# Patient Record
Sex: FEMALE | Race: BLACK OR AFRICAN AMERICAN | NOT HISPANIC OR LATINO
[De-identification: names, ages, dates, MRNs, and addresses within clinical notes are randomized per-mention and may not be internally consistent; named-entity substitution may affect disease eponyms.]

---

## 2017-02-17 ENCOUNTER — APPOINTMENT (OUTPATIENT)
Dept: PULMONOLOGY | Facility: CLINIC | Age: 82
End: 2017-02-17

## 2017-02-17 VITALS
RESPIRATION RATE: 16 BRPM | OXYGEN SATURATION: 95 % | SYSTOLIC BLOOD PRESSURE: 130 MMHG | HEART RATE: 74 BPM | DIASTOLIC BLOOD PRESSURE: 74 MMHG

## 2017-05-15 ENCOUNTER — RX RENEWAL (OUTPATIENT)
Age: 82
End: 2017-05-15

## 2017-09-27 ENCOUNTER — APPOINTMENT (OUTPATIENT)
Dept: PULMONOLOGY | Facility: CLINIC | Age: 82
End: 2017-09-27
Payer: MEDICARE

## 2017-09-27 VITALS
RESPIRATION RATE: 14 BRPM | OXYGEN SATURATION: 96 % | DIASTOLIC BLOOD PRESSURE: 80 MMHG | HEART RATE: 76 BPM | SYSTOLIC BLOOD PRESSURE: 122 MMHG | BODY MASS INDEX: 29.84 KG/M2 | WEIGHT: 152 LBS | HEIGHT: 60 IN

## 2017-09-27 DIAGNOSIS — Z85.3 PERSONAL HISTORY OF MALIGNANT NEOPLASM OF BREAST: ICD-10-CM

## 2017-09-27 PROCEDURE — 94010 BREATHING CAPACITY TEST: CPT

## 2017-09-27 PROCEDURE — 99214 OFFICE O/P EST MOD 30 MIN: CPT | Mod: 25

## 2017-11-14 ENCOUNTER — RX RENEWAL (OUTPATIENT)
Age: 82
End: 2017-11-14

## 2018-03-26 ENCOUNTER — APPOINTMENT (OUTPATIENT)
Dept: PULMONOLOGY | Facility: CLINIC | Age: 83
End: 2018-03-26
Payer: MEDICARE

## 2018-03-26 VITALS
SYSTOLIC BLOOD PRESSURE: 140 MMHG | WEIGHT: 160 LBS | BODY MASS INDEX: 31.25 KG/M2 | DIASTOLIC BLOOD PRESSURE: 60 MMHG | HEART RATE: 78 BPM | OXYGEN SATURATION: 96 %

## 2018-03-26 PROCEDURE — 99214 OFFICE O/P EST MOD 30 MIN: CPT

## 2018-05-18 ENCOUNTER — RX RENEWAL (OUTPATIENT)
Age: 83
End: 2018-05-18

## 2018-09-28 ENCOUNTER — APPOINTMENT (OUTPATIENT)
Dept: PULMONOLOGY | Facility: CLINIC | Age: 83
End: 2018-09-28

## 2018-10-03 ENCOUNTER — APPOINTMENT (OUTPATIENT)
Dept: PULMONOLOGY | Facility: CLINIC | Age: 83
End: 2018-10-03
Payer: MEDICARE

## 2018-10-03 VITALS
HEART RATE: 75 BPM | WEIGHT: 160 LBS | OXYGEN SATURATION: 95 % | DIASTOLIC BLOOD PRESSURE: 66 MMHG | BODY MASS INDEX: 31.25 KG/M2 | SYSTOLIC BLOOD PRESSURE: 130 MMHG

## 2018-10-03 PROCEDURE — 85018 HEMOGLOBIN: CPT | Mod: QW

## 2018-10-03 PROCEDURE — 94010 BREATHING CAPACITY TEST: CPT

## 2018-10-03 PROCEDURE — 94727 GAS DIL/WSHOT DETER LNG VOL: CPT

## 2018-10-03 PROCEDURE — 99214 OFFICE O/P EST MOD 30 MIN: CPT | Mod: 25

## 2019-01-07 ENCOUNTER — RX RENEWAL (OUTPATIENT)
Age: 84
End: 2019-01-07

## 2019-01-30 ENCOUNTER — RX RENEWAL (OUTPATIENT)
Age: 84
End: 2019-01-30

## 2019-04-09 ENCOUNTER — MEDICATION RENEWAL (OUTPATIENT)
Age: 84
End: 2019-04-09

## 2019-04-16 ENCOUNTER — APPOINTMENT (OUTPATIENT)
Dept: PULMONOLOGY | Facility: CLINIC | Age: 84
End: 2019-04-16
Payer: MEDICARE

## 2019-04-16 VITALS — SYSTOLIC BLOOD PRESSURE: 118 MMHG | DIASTOLIC BLOOD PRESSURE: 80 MMHG

## 2019-04-16 VITALS — BODY MASS INDEX: 31.61 KG/M2 | HEIGHT: 60 IN | WEIGHT: 161 LBS

## 2019-04-16 VITALS — OXYGEN SATURATION: 93 % | HEART RATE: 74 BPM

## 2019-04-16 DIAGNOSIS — Z87.09 PERSONAL HISTORY OF OTHER DISEASES OF THE RESPIRATORY SYSTEM: ICD-10-CM

## 2019-04-16 PROCEDURE — 99214 OFFICE O/P EST MOD 30 MIN: CPT

## 2019-04-16 NOTE — DISCUSSION/SUMMARY
[FreeTextEntry1] : \par   #1. The patient has evidence of Gold stage II-III COPD for which she will continue her Advair, Spiriva, and p.r.n. albuterol\par   #2. I have recommended diet and exercise for weight loss and to improve her exercise tolerance.\par   #3. She'll follow up in this office in 6 weeks for reevaluation \par #4. Will follow PFTs intermittently \par #5. Oncology f/u for breast cancer\par #6. Prednisone taper and Z-cassidy for cough and acute bronchitis

## 2019-04-16 NOTE — REVIEW OF SYSTEMS
[Chills] : no chills [Fever] : no fever [Eye Irritation] : no ~T irritation of the eyes [Dry Eyes] : no dryness of the eyes [Nasal Congestion] : no nasal congestion [Epistaxis] : no nosebleeds [Postnasal Drip] : no postnasal drip [Sputum] : not coughing up ~M sputum [Sinus Problems] : no sinus problems [Cough] : no cough [Dyspnea] : no dyspnea [Chest Tightness] : no chest tightness [Hemoptysis] : no hemoptysis [Wheezing] : no wheezing [Pleuritic Pain] : no pleuritic pain [Chest Discomfort] : no chest discomfort [Hypertension] : ~T hypertension [Dysrhythmia] : no dysrhythmia [Edema] : ~T edema was not present [Palpitations] : no palpitations [Murmurs] : no murmurs were heard [Reflux] : no reflux [Hay Fever] : no hay fever [Itchy Eyes] : no itching of ~T the eyes [Nausea] : no nausea [Vomiting] : no vomiting [Constipation] : no constipation [Diarrhea] : no diarrhea [Abdominal Pain] : no abdominal pain [Dysuria] : no dysuria [Trauma] : no ~T physical trauma [Back Pain] : ~T no back pain [Fracture] : no fracture [Anemia] : no anemia [Dizziness] : no dizziness [Headache] : no headache [Syncope] : no fainting [Paralysis] : no paralysis was seen [Numbness] : no numbness [Anxiety] : no anxiety [Seizures] : no seizures [Depression] : no depression [Diabetes] : no diabetes mellitus [Thyroid Problem] : thyroid problem [Difficulty Initiating Sleep] : no difficulty falling asleep [Witnessed Apneas] : demonstrated no ~M apnea [Snoring] : no snoring [Difficulty Maintaining Sleep] : no difficulty maintaining sleep [Nonrestorative Sleep] : restorative sleep

## 2019-04-16 NOTE — PROCEDURE
[FreeTextEntry1] : PFTs performed previously revealed moderate COPD and was unchanged when c/w her previous. Lung volumes are normal and could not assess diffusion capacity.\par Spirometry subsequently was c/w mild to moderate COPD and was without significant change when c/w her previous study\par PFTs 10/3/18 - Moderately reduced FVC and FEV1 with an obstructive pattern and near normal to mildly reduced lung volumes; unable to assess DLCO; overall lung function is slightly reduced than her previous but near baseline.

## 2019-04-16 NOTE — HISTORY OF PRESENT ILLNESS
[FreeTextEntry1] : Patient c/o SOBOE but is now also c/o wheeze, cough productive whitish sputum. She denies any fevers or chills to this point. She reports these symptoms started since last night.\par  [Doing Well] : doing well [Feelings Of Weakness On Exertion] : stable exercise intolerance [Difficulty Breathing During Exertion] : worsened dyspnea on exertion [Coughing Up Sputum] : worsened coughing up sputum [Cough] : worsened coughing [Regional Soft Tissue Swelling Both Lower Extremities] : denies lower extremity edema [Wheezing] : worsened wheezing [URI] : upper respiratory tract infection [Adherent] : the patient is adherent with ~his/her~ medication regimen [Follow-Up - Routine Clinic] : a routine clinic follow-up of [Goals--Doing Well] : the patient is doing well with ~his/her~ COPD goals [Excess Weight] : excess weight [Inability to Lose Weight] : inability to lose weight [Low Calorie Diet] : low calorie diet [None] : No associated symptoms are reported [Poor Compliance] : poor compliance with treatment [Poor Symptom Control] : poor symptom control [Poor Tolerance] : poor tolerance of treatment [Hypertension] : hypertension [Low Calorie] : low calorie [High] : high [Sedentary] : is sedentary [Well Balanced Diet] : well balanced meals

## 2019-04-16 NOTE — PHYSICAL EXAM
[Normal Conjunctiva] : the conjunctiva exhibited no abnormalities [Normal Appearance] : normal appearance [Enlarged Base of the Tongue] : enlargement of the base of the tongue [III] : III [Neck Appearance] : the appearance of the neck was normal [Heart Rate And Rhythm] : heart rate and rhythm were normal [Murmurs] : no murmurs present [Heart Sounds] : normal S1 and S2 [Edema] : no peripheral edema present [] : no respiratory distress [Auscultation Breath Sounds / Voice Sounds] : lungs were clear to auscultation bilaterally [Exaggerated Use Of Accessory Muscles For Inspiration] : no accessory muscle use [Respiration, Rhythm And Depth] : normal respiratory rhythm and effort [Abdomen Soft] : soft [FreeTextEntry1] : No abnormalities [Abdomen Tenderness] : non-tender [Abnormal Walk] : normal gait [No Focal Deficits] : no focal deficits [Nail Clubbing] : no clubbing of the fingernails [Cyanosis, Localized] : no localized cyanosis [Oriented To Time, Place, And Person] : oriented to person, place, and time

## 2019-04-16 NOTE — REASON FOR VISIT
[Acute] : an acute visit [Shortness of Breath] : shortness of Breath [FreeTextEntry2] : obesity [COPD] : COPD

## 2019-04-16 NOTE — CONSULT LETTER
[Consult Letter:] : I had the pleasure of evaluating your patient, [unfilled]. [Dear  ___] : Dear  [unfilled], [Please see my note below.] : Please see my note below. [Consult Closing:] : Thank you very much for allowing me to participate in the care of this patient.  If you have any questions, please do not hesitate to contact me. [Sincerely,] : Sincerely, [Deshawn Vidales MD] : Deshawn Vidales MD [FreeTextEntry3] : Deshawn Vidales MD, FCCP, SUSAN. ABSM\par

## 2019-05-17 ENCOUNTER — APPOINTMENT (OUTPATIENT)
Dept: PULMONOLOGY | Facility: CLINIC | Age: 84
End: 2019-05-17
Payer: MEDICARE

## 2019-05-17 VITALS
DIASTOLIC BLOOD PRESSURE: 70 MMHG | OXYGEN SATURATION: 95 % | HEIGHT: 60 IN | SYSTOLIC BLOOD PRESSURE: 120 MMHG | HEART RATE: 78 BPM | BODY MASS INDEX: 32.03 KG/M2

## 2019-05-17 VITALS — WEIGHT: 164 LBS | BODY MASS INDEX: 32.03 KG/M2

## 2019-05-17 PROCEDURE — 99214 OFFICE O/P EST MOD 30 MIN: CPT | Mod: 25

## 2019-05-17 PROCEDURE — 94664 DEMO&/EVAL PT USE INHALER: CPT | Mod: 59

## 2019-05-17 PROCEDURE — 94060 EVALUATION OF WHEEZING: CPT

## 2019-05-17 NOTE — HISTORY OF PRESENT ILLNESS
[Doing Well] : doing well [Difficulty Breathing During Exertion] : improved dyspnea on exertion [Feelings Of Weakness On Exertion] : stable exercise intolerance [Cough] : resolved coughing [Coughing Up Sputum] : resolved coughing up sputum [Wheezing] : resolved wheezing [Regional Soft Tissue Swelling Both Lower Extremities] : denies lower extremity edema [URI] : upper respiratory tract infection [Adherent] : the patient is adherent with ~his/her~ medication regimen [Goals--Doing Well] : the patient is doing well with ~his/her~ COPD goals [Follow-Up - Routine Clinic] : a routine clinic follow-up of [Excess Weight] : excess weight [None] : No associated symptoms are reported [Inability to Lose Weight] : inability to lose weight [Low Calorie Diet] : low calorie diet [Poor Compliance] : poor compliance with treatment [Poor Tolerance] : poor tolerance of treatment [Poor Symptom Control] : poor symptom control [High] : high [Hypertension] : hypertension [Low Calorie] : low calorie [Well Balanced Diet] : well balanced meals [Sedentary] : is sedentary [FreeTextEntry1] : Patient reports resolution of her previous URI symptoms and is back to baseline.\par

## 2019-05-17 NOTE — DISCUSSION/SUMMARY
[FreeTextEntry1] : \par   #1. The patient has evidence of Gold stage II-III COPD for which she will continue her Advair, Spiriva, and p.r.n. albuterol\par   #2. I have recommended diet and exercise for weight loss and to improve her exercise tolerance.\par   #3. She'll follow up in this office in 4 months for reevaluation \par #4. Will follow PFTs intermittently \par #5. Oncology f/u for breast cancer\par #6. Completed prednisone taper and Z-cassidy for cough and acute bronchitis

## 2019-05-17 NOTE — REVIEW OF SYSTEMS
[Hypertension] : ~T hypertension [Thyroid Problem] : thyroid problem [Fever] : no fever [Chills] : no chills [Dry Eyes] : no dryness of the eyes [Eye Irritation] : no ~T irritation of the eyes [Epistaxis] : no nosebleeds [Nasal Congestion] : no nasal congestion [Sinus Problems] : no sinus problems [Postnasal Drip] : no postnasal drip [Cough] : no cough [Hemoptysis] : no hemoptysis [Sputum] : not coughing up ~M sputum [Chest Tightness] : no chest tightness [Dyspnea] : no dyspnea [Pleuritic Pain] : no pleuritic pain [Wheezing] : no wheezing [Chest Discomfort] : no chest discomfort [Murmurs] : no murmurs were heard [Dysrhythmia] : no dysrhythmia [Palpitations] : no palpitations [Hay Fever] : no hay fever [Edema] : ~T edema was not present [Itchy Eyes] : no itching of ~T the eyes [Reflux] : no reflux [Nausea] : no nausea [Vomiting] : no vomiting [Constipation] : no constipation [Diarrhea] : no diarrhea [Abdominal Pain] : no abdominal pain [Dysuria] : no dysuria [Trauma] : no ~T physical trauma [Fracture] : no fracture [Back Pain] : ~T no back pain [Anemia] : no anemia [Headache] : no headache [Dizziness] : no dizziness [Syncope] : no fainting [Numbness] : no numbness [Seizures] : no seizures [Paralysis] : no paralysis was seen [Depression] : no depression [Anxiety] : no anxiety [Diabetes] : no diabetes mellitus [Difficulty Initiating Sleep] : no difficulty falling asleep [Difficulty Maintaining Sleep] : no difficulty maintaining sleep [Snoring] : no snoring [Witnessed Apneas] : demonstrated no ~M apnea [Nonrestorative Sleep] : restorative sleep

## 2019-05-17 NOTE — PROCEDURE
[FreeTextEntry1] : PFTs performed previously revealed moderate COPD and was unchanged when c/w her previous. Lung volumes are normal and could not assess diffusion capacity.\par Spirometry subsequently was c/w mild to moderate COPD and was without significant change when c/w her previous study\par PFTs 10/3/18 - Moderately reduced FVC and FEV1 with an obstructive pattern and near normal to mildly reduced lung volumes; unable to assess DLCO; overall lung function is slightly reduced than her previous but near baseline.\par Spirometry 5/17/19 - Moderate COPD and slightly worse than previous.

## 2019-05-17 NOTE — PHYSICAL EXAM
[Normal Conjunctiva] : the conjunctiva exhibited no abnormalities [Normal Appearance] : normal appearance [Enlarged Base of the Tongue] : enlargement of the base of the tongue [III] : III [Neck Appearance] : the appearance of the neck was normal [Heart Rate And Rhythm] : heart rate and rhythm were normal [Murmurs] : no murmurs present [Heart Sounds] : normal S1 and S2 [Edema] : no peripheral edema present [] : no respiratory distress [Exaggerated Use Of Accessory Muscles For Inspiration] : no accessory muscle use [Respiration, Rhythm And Depth] : normal respiratory rhythm and effort [Auscultation Breath Sounds / Voice Sounds] : lungs were clear to auscultation bilaterally [Abdomen Soft] : soft [Abdomen Tenderness] : non-tender [Abnormal Walk] : normal gait [Nail Clubbing] : no clubbing of the fingernails [Cyanosis, Localized] : no localized cyanosis [No Focal Deficits] : no focal deficits [Oriented To Time, Place, And Person] : oriented to person, place, and time [FreeTextEntry1] : No abnormalities

## 2019-05-17 NOTE — CONSULT LETTER
[Dear  ___] : Dear  [unfilled], [Consult Letter:] : I had the pleasure of evaluating your patient, [unfilled]. [Consult Closing:] : Thank you very much for allowing me to participate in the care of this patient.  If you have any questions, please do not hesitate to contact me. [Sincerely,] : Sincerely, [Please see my note below.] : Please see my note below. [Deshawn Vidales MD] : Deshawn Vidales MD [FreeTextEntry3] : Deshawn Vidales MD, FCCP, SUSAN. ABSM\par

## 2019-05-17 NOTE — REASON FOR VISIT
[Follow-Up] : a follow-up visit [COPD] : COPD [Shortness of Breath] : shortness of Breath [FreeTextEntry2] : obesity

## 2019-08-22 ENCOUNTER — APPOINTMENT (OUTPATIENT)
Dept: PULMONOLOGY | Facility: CLINIC | Age: 84
End: 2019-08-22
Payer: MEDICARE

## 2019-08-22 VITALS
SYSTOLIC BLOOD PRESSURE: 140 MMHG | BODY MASS INDEX: 32.2 KG/M2 | DIASTOLIC BLOOD PRESSURE: 76 MMHG | HEART RATE: 76 BPM | OXYGEN SATURATION: 96 % | HEIGHT: 60 IN | WEIGHT: 164 LBS

## 2019-08-22 PROCEDURE — 99215 OFFICE O/P EST HI 40 MIN: CPT | Mod: 25

## 2019-08-22 PROCEDURE — 94664 DEMO&/EVAL PT USE INHALER: CPT | Mod: 59

## 2019-08-22 PROCEDURE — 94060 EVALUATION OF WHEEZING: CPT

## 2019-08-22 NOTE — HISTORY OF PRESENT ILLNESS
[Difficulty Breathing During Exertion] : worsened dyspnea on exertion [Feelings Of Weakness On Exertion] : worsened exercise intolerance [Cough] : worsened coughing [Coughing Up Sputum] : worsened coughing up sputum [Wheezing] : worsened wheezing [Regional Soft Tissue Swelling Both Lower Extremities] : denies lower extremity edema [URI] : upper respiratory tract infection [Adherent] : the patient is adherent with ~his/her~ medication regimen [Goals--Doing Well] : the patient is doing well with ~his/her~ COPD goals [Follow-Up - Routine Clinic] : a routine clinic follow-up of [Excess Weight] : excess weight [Inability to Lose Weight] : inability to lose weight [None] : No associated symptoms are reported [Low Calorie Diet] : low calorie diet [Poor Compliance] : poor compliance with treatment [Poor Symptom Control] : poor symptom control [Poor Tolerance] : poor tolerance of treatment [Hypertension] : hypertension [High] : high [Low Calorie] : low calorie [Well Balanced Diet] : well balanced meals [Sedentary] : is sedentary [FreeTextEntry1] : Patient reports increased SOB and wheeze with cough productive of clear sputum. She has been using her inhalers without improvement.\par  [Doing Poorly] : doing poorly

## 2019-08-22 NOTE — REASON FOR VISIT
[COPD] : COPD [Shortness of Breath] : shortness of Breath [Acute] : an acute visit [FreeTextEntry2] : obesity

## 2019-08-22 NOTE — CONSULT LETTER
[Dear  ___] : Dear  [unfilled], [Consult Letter:] : I had the pleasure of evaluating your patient, [unfilled]. [Please see my note below.] : Please see my note below. [Consult Closing:] : Thank you very much for allowing me to participate in the care of this patient.  If you have any questions, please do not hesitate to contact me. [Sincerely,] : Sincerely, [Deshawn Vidales MD] : Deshawn Vidales MD [FreeTextEntry3] : Deshawn Vidales MD, FCCP, D. ABSM\par Pulmonary and Sleep Medicine\par [facility] [place]\par

## 2019-08-22 NOTE — REVIEW OF SYSTEMS
[Hypertension] : ~T hypertension [Thyroid Problem] : thyroid problem [Fever] : no fever [Chills] : no chills [Dry Eyes] : no dryness of the eyes [Eye Irritation] : no ~T irritation of the eyes [Nasal Congestion] : no nasal congestion [Epistaxis] : no nosebleeds [Postnasal Drip] : no postnasal drip [Sinus Problems] : no sinus problems [Sputum] : not coughing up ~M sputum [Cough] : no cough [Dyspnea] : no dyspnea [Hemoptysis] : no hemoptysis [Chest Tightness] : no chest tightness [Pleuritic Pain] : no pleuritic pain [Wheezing] : no wheezing [Chest Discomfort] : no chest discomfort [Dysrhythmia] : no dysrhythmia [Murmurs] : no murmurs were heard [Palpitations] : no palpitations [Edema] : ~T edema was not present [Hay Fever] : no hay fever [Reflux] : no reflux [Itchy Eyes] : no itching of ~T the eyes [Nausea] : no nausea [Vomiting] : no vomiting [Constipation] : no constipation [Diarrhea] : no diarrhea [Abdominal Pain] : no abdominal pain [Dysuria] : no dysuria [Fracture] : no fracture [Trauma] : no ~T physical trauma [Back Pain] : ~T no back pain [Anemia] : no anemia [Headache] : no headache [Dizziness] : no dizziness [Syncope] : no fainting [Numbness] : no numbness [Paralysis] : no paralysis was seen [Seizures] : no seizures [Depression] : no depression [Anxiety] : no anxiety [Difficulty Initiating Sleep] : no difficulty falling asleep [Diabetes] : no diabetes mellitus [Snoring] : no snoring [Difficulty Maintaining Sleep] : no difficulty maintaining sleep [Nonrestorative Sleep] : restorative sleep [Witnessed Apneas] : demonstrated no ~M apnea

## 2019-08-22 NOTE — PHYSICAL EXAM
[Normal Appearance] : normal appearance [Normal Conjunctiva] : the conjunctiva exhibited no abnormalities [Enlarged Base of the Tongue] : enlargement of the base of the tongue [III] : III [Neck Appearance] : the appearance of the neck was normal [Heart Rate And Rhythm] : heart rate and rhythm were normal [Heart Sounds] : normal S1 and S2 [Murmurs] : no murmurs present [Edema] : no peripheral edema present [] : no respiratory distress [Respiration, Rhythm And Depth] : normal respiratory rhythm and effort [Exaggerated Use Of Accessory Muscles For Inspiration] : no accessory muscle use [Auscultation Breath Sounds / Voice Sounds] : lungs were clear to auscultation bilaterally [Abdomen Soft] : soft [Abdomen Tenderness] : non-tender [Abnormal Walk] : normal gait [Nail Clubbing] : no clubbing of the fingernails [Cyanosis, Localized] : no localized cyanosis [No Focal Deficits] : no focal deficits [Oriented To Time, Place, And Person] : oriented to person, place, and time [FreeTextEntry1] : No abnormalities

## 2019-08-22 NOTE — DISCUSSION/SUMMARY
[FreeTextEntry1] : \par   #1. The patient has evidence of Gold stage II-III COPD for which she will continue her Advair, Spiriva, and p.r.n. albuterol\par   #2. I have recommended diet and exercise for weight loss and to improve her exercise tolerance.\par   #3. She'll follow up in this office in 3 months for reevaluation \par #4. Will follow lung function\par #5. Oncology f/u for breast cancer\par #6. Prednisone taper and Z-cassidy for cough and wheeze\par #7. Will start nebulizer therapy for COPD/wheeze with Albuterol as needed

## 2019-08-22 NOTE — PROCEDURE
[FreeTextEntry1] : PFTs performed previously revealed moderate COPD and was unchanged when c/w her previous. Lung volumes are normal and could not assess diffusion capacity.\par Spirometry subsequently was c/w mild to moderate COPD and was without significant change when c/w her previous study\par PFTs 10/3/18 - Moderately reduced FVC and FEV1 with an obstructive pattern and near normal to mildly reduced lung volumes; unable to assess DLCO; overall lung function is slightly reduced than her previous but near baseline.\par Spirometry 5/17/19 - Moderate COPD and slightly worse than previous.\par Spirometry 8/22/19 - Moderate COPD without significant change

## 2019-08-23 ENCOUNTER — MEDICATION RENEWAL (OUTPATIENT)
Age: 84
End: 2019-08-23

## 2019-09-17 ENCOUNTER — APPOINTMENT (OUTPATIENT)
Dept: PULMONOLOGY | Facility: CLINIC | Age: 84
End: 2019-09-17

## 2020-01-10 ENCOUNTER — APPOINTMENT (OUTPATIENT)
Dept: PULMONOLOGY | Facility: CLINIC | Age: 85
End: 2020-01-10
Payer: MEDICARE

## 2020-01-10 VITALS — DIASTOLIC BLOOD PRESSURE: 88 MMHG | SYSTOLIC BLOOD PRESSURE: 150 MMHG

## 2020-01-10 VITALS — WEIGHT: 159 LBS | HEART RATE: 76 BPM | BODY MASS INDEX: 32.49 KG/M2 | OXYGEN SATURATION: 95 % | HEIGHT: 58.5 IN

## 2020-01-10 PROCEDURE — 94664 DEMO&/EVAL PT USE INHALER: CPT | Mod: 59

## 2020-01-10 PROCEDURE — 94060 EVALUATION OF WHEEZING: CPT

## 2020-01-10 PROCEDURE — 94727 GAS DIL/WSHOT DETER LNG VOL: CPT

## 2020-01-10 PROCEDURE — 85018 HEMOGLOBIN: CPT | Mod: QW

## 2020-01-10 PROCEDURE — 99215 OFFICE O/P EST HI 40 MIN: CPT | Mod: 25

## 2020-01-10 RX ORDER — PREDNISONE 5 MG/1
5 TABLET ORAL DAILY
Qty: 100 | Refills: 1 | Status: DISCONTINUED | COMMUNITY
Start: 2019-04-16 | End: 2020-01-10

## 2020-01-10 RX ORDER — AZITHROMYCIN 250 MG/1
250 TABLET, FILM COATED ORAL
Qty: 1 | Refills: 0 | Status: DISCONTINUED | COMMUNITY
Start: 2019-04-16 | End: 2020-01-10

## 2020-01-10 RX ORDER — PREDNISONE 10 MG/1
10 TABLET ORAL DAILY
Qty: 100 | Refills: 1 | Status: DISCONTINUED | COMMUNITY
Start: 2019-08-22 | End: 2020-01-10

## 2020-01-10 RX ORDER — AZITHROMYCIN 250 MG/1
250 TABLET, FILM COATED ORAL
Qty: 1 | Refills: 0 | Status: DISCONTINUED | COMMUNITY
Start: 2019-08-22 | End: 2020-01-10

## 2020-01-10 NOTE — CONSULT LETTER
[Dear  ___] : Dear  [unfilled], [Consult Letter:] : I had the pleasure of evaluating your patient, [unfilled]. [Please see my note below.] : Please see my note below. [Consult Closing:] : Thank you very much for allowing me to participate in the care of this patient.  If you have any questions, please do not hesitate to contact me. [Deshawn Vidales MD] : Deshawn Vidales MD [Sincerely,] : Sincerely, [FreeTextEntry3] : Deshawn Vidales MD, FCCP, D. ABSM\par Pulmonary and Sleep Medicine\par [facility] [place]\par

## 2020-01-10 NOTE — DISCUSSION/SUMMARY
[FreeTextEntry1] : \par   #1. The patient has evidence of Gold stage II-III COPD for which she will continue her Advair, Spiriva, and p.r.n. albuterol\par   #2. I have recommended diet and exercise for weight loss and to improve her exercise tolerance.\par   #3. She'll follow up in this office in 3-4 months for reevaluation \par #4. Will follow lung function periodically\par #5. Oncology f/u for breast cancer\par #6. Nebulizer therapy for COPD/wheeze with Albuterol as needed\par #7. She does not appear to require home O2 as she did not desaturate at rest or on exertion

## 2020-01-10 NOTE — PROCEDURE
[FreeTextEntry1] : PFTs performed previously revealed moderate COPD and was unchanged when c/w her previous. Lung volumes are normal and could not assess diffusion capacity.\par Spirometry subsequently was c/w mild to moderate COPD and was without significant change when c/w her previous study\par PFTs 10/3/18 - Moderately reduced FVC and FEV1 with an obstructive pattern and near normal to mildly reduced lung volumes; unable to assess DLCO; overall lung function is slightly reduced than her previous but near baseline.\par Spirometry 5/17/19 - Moderate COPD and slightly worse than previous.\par Spirometry 8/22/19 - Moderate COPD without significant change\par PFTs 1/10/20 - Moderate COPD slightly worse than previous but with essentially normal lung volumes but diffusion capacity could not be determined

## 2020-01-10 NOTE — PHYSICAL EXAM
[Normal Appearance] : normal appearance [Normal Conjunctiva] : the conjunctiva exhibited no abnormalities [Enlarged Base of the Tongue] : enlargement of the base of the tongue [III] : III [Neck Appearance] : the appearance of the neck was normal [Heart Rate And Rhythm] : heart rate and rhythm were normal [Heart Sounds] : normal S1 and S2 [Murmurs] : no murmurs present [Edema] : no peripheral edema present [] : no respiratory distress [Respiration, Rhythm And Depth] : normal respiratory rhythm and effort [Exaggerated Use Of Accessory Muscles For Inspiration] : no accessory muscle use [Auscultation Breath Sounds / Voice Sounds] : lungs were clear to auscultation bilaterally [Abdomen Tenderness] : non-tender [Abdomen Soft] : soft [Abnormal Walk] : normal gait [Nail Clubbing] : no clubbing of the fingernails [Cyanosis, Localized] : no localized cyanosis [No Focal Deficits] : no focal deficits [Oriented To Time, Place, And Person] : oriented to person, place, and time [FreeTextEntry1] : No abnormalities

## 2020-01-10 NOTE — HISTORY OF PRESENT ILLNESS
[Stable] : stable [Difficulty Breathing During Exertion] : stable dyspnea on exertion [Feelings Of Weakness On Exertion] : stable exercise intolerance [Cough] : denies coughing [Coughing Up Sputum] : denies coughing up sputum [Wheezing] : denies wheezing [Regional Soft Tissue Swelling Both Lower Extremities] : denies lower extremity edema [Adherent] : the patient is adherent with ~his/her~ medication regimen [Goals--Doing Well] : the patient is doing well with ~his/her~ COPD goals [Follow-Up - Routine Clinic] : a routine clinic follow-up of [Excess Weight] : excess weight [Inability to Lose Weight] : inability to lose weight [None] : No associated symptoms are reported [Low Calorie Diet] : low calorie diet [Poor Compliance] : poor compliance with treatment [Poor Tolerance] : poor tolerance of treatment [Poor Symptom Control] : poor symptom control [Hypertension] : hypertension [High] : high [Low Calorie] : low calorie [Well Balanced Diet] : well balanced meals [Sedentary] : is sedentary [FreeTextEntry1] : Patient reports increased SOB especially by mid day but otherwise without cough, fevers, chills or other respiratory complaints.\par

## 2020-01-10 NOTE — REASON FOR VISIT
[Acute] : an acute visit [COPD] : COPD [Shortness of Breath] : shortness of Breath [FreeTextEntry2] : obesity

## 2020-01-10 NOTE — REVIEW OF SYSTEMS
[Hypertension] : ~T hypertension [Thyroid Problem] : thyroid problem [Fever] : no fever [Chills] : no chills [Dry Eyes] : no dryness of the eyes [Epistaxis] : no nosebleeds [Eye Irritation] : no ~T irritation of the eyes [Nasal Congestion] : no nasal congestion [Postnasal Drip] : no postnasal drip [Sinus Problems] : no sinus problems [Cough] : no cough [Sputum] : not coughing up ~M sputum [Hemoptysis] : no hemoptysis [Dyspnea] : no dyspnea [Chest Tightness] : no chest tightness [Wheezing] : no wheezing [Pleuritic Pain] : no pleuritic pain [Chest Discomfort] : no chest discomfort [Dysrhythmia] : no dysrhythmia [Murmurs] : no murmurs were heard [Palpitations] : no palpitations [Edema] : ~T edema was not present [Hay Fever] : no hay fever [Itchy Eyes] : no itching of ~T the eyes [Reflux] : no reflux [Vomiting] : no vomiting [Nausea] : no nausea [Abdominal Pain] : no abdominal pain [Diarrhea] : no diarrhea [Dysuria] : no dysuria [Constipation] : no constipation [Trauma] : no ~T physical trauma [Back Pain] : ~T no back pain [Anemia] : no anemia [Fracture] : no fracture [Headache] : no headache [Syncope] : no fainting [Dizziness] : no dizziness [Paralysis] : no paralysis was seen [Numbness] : no numbness [Seizures] : no seizures [Anxiety] : no anxiety [Diabetes] : no diabetes mellitus [Depression] : no depression [Difficulty Initiating Sleep] : no difficulty falling asleep [Difficulty Maintaining Sleep] : no difficulty maintaining sleep [Snoring] : no snoring [Witnessed Apneas] : demonstrated no ~M apnea [Nonrestorative Sleep] : restorative sleep

## 2020-05-21 ENCOUNTER — APPOINTMENT (OUTPATIENT)
Dept: PULMONOLOGY | Facility: CLINIC | Age: 85
End: 2020-05-21
Payer: MEDICARE

## 2020-05-21 PROCEDURE — 99213 OFFICE O/P EST LOW 20 MIN: CPT | Mod: 95

## 2020-05-21 NOTE — DISCUSSION/SUMMARY
[FreeTextEntry1] : \par   #1. The patient has evidence of Gold stage II-III COPD for which she will continue her Advair, Spiriva, and p.r.n. albuterol\par   #2. I have recommended diet and exercise for weight loss and to improve her exercise tolerance.\par   #3. She'll follow up in this office in 3-4 months for reevaluation \par #4. Will follow lung function periodically\par #5. Oncology f/u for breast cancer\par #6. Nebulizer therapy for COPD/wheeze with Albuterol as needed\par #7. She does not appear to require home O2 as she did not desaturate at rest or on exertion previously

## 2020-05-21 NOTE — REVIEW OF SYSTEMS
[Hypertension] : ~T hypertension [Thyroid Problem] : thyroid problem [Fever] : no fever [Chills] : no chills [Dry Eyes] : no dryness of the eyes [Eye Irritation] : no ~T irritation of the eyes [Nasal Congestion] : no nasal congestion [Epistaxis] : no nosebleeds [Postnasal Drip] : no postnasal drip [Sinus Problems] : no sinus problems [Cough] : no cough [Sputum] : not coughing up ~M sputum [Hemoptysis] : no hemoptysis [Dyspnea] : no dyspnea [Chest Tightness] : no chest tightness [Wheezing] : no wheezing [Pleuritic Pain] : no pleuritic pain [Chest Discomfort] : no chest discomfort [Dysrhythmia] : no dysrhythmia [Murmurs] : no murmurs were heard [Palpitations] : no palpitations [Edema] : ~T edema was not present [Hay Fever] : no hay fever [Itchy Eyes] : no itching of ~T the eyes [Reflux] : no reflux [Nausea] : no nausea [Vomiting] : no vomiting [Constipation] : no constipation [Diarrhea] : no diarrhea [Abdominal Pain] : no abdominal pain [Dysuria] : no dysuria [Trauma] : no ~T physical trauma [Fracture] : no fracture [Back Pain] : ~T no back pain [Anemia] : no anemia [Headache] : no headache [Syncope] : no fainting [Dizziness] : no dizziness [Numbness] : no numbness [Paralysis] : no paralysis was seen [Seizures] : no seizures [Depression] : no depression [Anxiety] : no anxiety [Diabetes] : no diabetes mellitus [Difficulty Initiating Sleep] : no difficulty falling asleep [Difficulty Maintaining Sleep] : no difficulty maintaining sleep [Snoring] : no snoring [Witnessed Apneas] : demonstrated no ~M apnea [Nonrestorative Sleep] : restorative sleep

## 2020-05-21 NOTE — CONSULT LETTER
[Dear  ___] : Dear  [unfilled], [Consult Letter:] : I had the pleasure of evaluating your patient, [unfilled]. [Please see my note below.] : Please see my note below. [Sincerely,] : Sincerely, [Consult Closing:] : Thank you very much for allowing me to participate in the care of this patient.  If you have any questions, please do not hesitate to contact me. [FreeTextEntry3] : Deshawn Vidales MD, FCCP, D. ABSM\par Pulmonary and Sleep Medicine\par [facility] at [place]\par

## 2020-05-21 NOTE — PHYSICAL EXAM
[Normal Conjunctiva] : the conjunctiva exhibited no abnormalities [Normal Appearance] : normal appearance [Neck Appearance] : the appearance of the neck was normal [] : no respiratory distress [Exaggerated Use Of Accessory Muscles For Inspiration] : no accessory muscle use [Respiration, Rhythm And Depth] : normal respiratory rhythm and effort [Abnormal Walk] : normal gait [Oriented To Time, Place, And Person] : oriented to person, place, and time [FreeTextEntry1] : Moderate oropharyngeal crowding

## 2020-05-21 NOTE — HISTORY OF PRESENT ILLNESS
[Home] : at home, [unfilled] , at the time of the visit. [Medical Office: ([facility])___] : at the medical office located in  [Verbal consent obtained from patient] : the patient, [unfilled] [Stable] : stable [Feelings Of Weakness On Exertion] : stable exercise intolerance [Difficulty Breathing During Exertion] : stable dyspnea on exertion [Wheezing] : denies wheezing [Cough] : denies coughing [Coughing Up Sputum] : denies coughing up sputum [Regional Soft Tissue Swelling Both Lower Extremities] : denies lower extremity edema [Adherent] : the patient is adherent with ~his/her~ medication regimen [Goals--Doing Well] : the patient is doing well with ~his/her~ COPD goals [Inability to Lose Weight] : inability to lose weight [Excess Weight] : excess weight [Follow-Up - Routine Clinic] : a routine clinic follow-up of [Low Calorie Diet] : low calorie diet [None] : No associated symptoms are reported [Poor Tolerance] : poor tolerance of treatment [Poor Compliance] : poor compliance with treatment [Poor Symptom Control] : poor symptom control [High] : high [Hypertension] : hypertension [Low Calorie] : low calorie [Sedentary] : is sedentary [Well Balanced Diet] : well balanced meals [FreeTextEntry1] : Patient reports increased SOB especially by mid day but otherwise without cough, fevers, chills or other respiratory complaints.\par

## 2020-07-20 ENCOUNTER — APPOINTMENT (OUTPATIENT)
Dept: PULMONOLOGY | Facility: CLINIC | Age: 85
End: 2020-07-20
Payer: MEDICARE

## 2020-07-20 VITALS
WEIGHT: 157 LBS | OXYGEN SATURATION: 95 % | SYSTOLIC BLOOD PRESSURE: 138 MMHG | HEART RATE: 86 BPM | DIASTOLIC BLOOD PRESSURE: 76 MMHG | BODY MASS INDEX: 32.25 KG/M2

## 2020-07-20 PROCEDURE — 99214 OFFICE O/P EST MOD 30 MIN: CPT

## 2020-07-20 RX ORDER — PREDNISONE 20 MG/1
20 TABLET ORAL
Qty: 10 | Refills: 0 | Status: DISCONTINUED | COMMUNITY
Start: 2020-01-26

## 2020-07-20 NOTE — DISCUSSION/SUMMARY
[FreeTextEntry1] : \par   #1. The patient has evidence of Gold stage II-III COPD for which she will continue her Advair, Spiriva, and p.r.n. albuterol\par   #2. I have recommended diet and exercise for weight loss and to improve her exercise tolerance.\par   #3. Diet and exercise for weight loss\par #4. Will follow lung function periodically\par #5. Oncology f/u for breast cancer\par #6. Nebulizer therapy for COPD/wheeze with Albuterol as needed\par #7. She does not appear to require home O2 as she did not desaturate at rest or on exertion previously\par #8. She'll follow up in this office in 4 months for reevaluation \par #9. Reviewed risks of exposure and symptoms of Covid-19 virus, including how the virus is spread.\par \par Discussed the following risk-reducing strategies:\par -Wash hands with soap and water (proper technique reviewed) \par -Use alcohol based  when hand-washing is not an option\par -Maintain a social distance of at least 6 feet whenever possible\par -Avoid contact, especially hand shaking\par -Avoid touching eyes, nose, and mouth\par -Cover face/mouth when coughing or sneezing\par -Avoid close contact with sick people\par -Seek medical help if you develop a fever and/or respiratory symptoms (e.g. cough, chest pain, SOB)\par \par Patient's questions were answered and patient appears to understand these recommendations

## 2020-07-20 NOTE — HISTORY OF PRESENT ILLNESS
[Stable] : stable [Difficulty Breathing During Exertion] : stable dyspnea on exertion [Feelings Of Weakness On Exertion] : stable exercise intolerance [Coughing Up Sputum] : denies coughing up sputum [Cough] : denies coughing [Regional Soft Tissue Swelling Both Lower Extremities] : denies lower extremity edema [Wheezing] : denies wheezing [Adherent] : the patient is adherent with ~his/her~ medication regimen [Goals--Doing Well] : the patient is doing well with ~his/her~ COPD goals [Follow-Up - Routine Clinic] : a routine clinic follow-up of [None] : No associated symptoms are reported [Inability to Lose Weight] : inability to lose weight [Excess Weight] : excess weight [Low Calorie Diet] : low calorie diet [Poor Compliance] : poor compliance with treatment [Poor Tolerance] : poor tolerance of treatment [Hypertension] : hypertension [Poor Symptom Control] : poor symptom control [Well Balanced Diet] : well balanced meals [Low Calorie] : low calorie [High] : high [Sedentary] : is sedentary [FreeTextEntry1] : Patient reports increased SOB especially by mid day but otherwise without cough, fevers, chills or other respiratory complaints.\par

## 2020-07-20 NOTE — PHYSICAL EXAM
[No Acute Distress] : no acute distress [Well Nourished] : well nourished [Well Developed] : well developed [Normal Appearance] : normal appearance [Normal Rate/Rhythm] : normal rate/rhythm [Supple] : supple [Normal S1, S2] : normal s1, s2 [No Murmurs] : no murmurs [No Resp Distress] : no resp distress [No Acc Muscle Use] : no acc muscle use [Normal Rhythm and Effort] : normal rhythm and effort [Clear to Auscultation Bilaterally] : clear to auscultation bilaterally [No Abnormalities] : no abnormalities [Benign] : benign [Not Tender] : not tender [Soft] : soft [Normal Gait] : normal gait [No Clubbing] : no clubbing [No Cyanosis] : no cyanosis [No Edema] : no edema [Oriented x3] : oriented x3 [No Focal Deficits] : no focal deficits

## 2020-07-20 NOTE — REVIEW OF SYSTEMS
[Hypertension] : ~T hypertension [Thyroid Problem] : thyroid problem [Fever] : no fever [Chills] : no chills [Dry Eyes] : no dryness of the eyes [Eye Irritation] : no ~T irritation of the eyes [Epistaxis] : no nosebleeds [Nasal Congestion] : no nasal congestion [Sinus Problems] : no sinus problems [Postnasal Drip] : no postnasal drip [Hemoptysis] : no hemoptysis [Cough] : no cough [Sputum] : not coughing up ~M sputum [Chest Tightness] : no chest tightness [Pleuritic Pain] : no pleuritic pain [Dyspnea] : no dyspnea [Chest Discomfort] : no chest discomfort [Wheezing] : no wheezing [Murmurs] : no murmurs were heard [Dysrhythmia] : no dysrhythmia [Palpitations] : no palpitations [Hay Fever] : no hay fever [Edema] : ~T edema was not present [Reflux] : no reflux [Itchy Eyes] : no itching of ~T the eyes [Vomiting] : no vomiting [Nausea] : no nausea [Diarrhea] : no diarrhea [Abdominal Pain] : no abdominal pain [Constipation] : no constipation [Dysuria] : no dysuria [Trauma] : no ~T physical trauma [Fracture] : no fracture [Back Pain] : ~T no back pain [Anemia] : no anemia [Dizziness] : no dizziness [Headache] : no headache [Numbness] : no numbness [Syncope] : no fainting [Paralysis] : no paralysis was seen [Seizures] : no seizures [Depression] : no depression [Anxiety] : no anxiety [Diabetes] : no diabetes mellitus [Snoring] : no snoring [Difficulty Initiating Sleep] : no difficulty falling asleep [Difficulty Maintaining Sleep] : no difficulty maintaining sleep [Witnessed Apneas] : demonstrated no ~M apnea [Nonrestorative Sleep] : restorative sleep

## 2020-07-20 NOTE — CONSULT LETTER
[Dear  ___] : Dear  [unfilled], [Consult Letter:] : I had the pleasure of evaluating your patient, [unfilled]. [Please see my note below.] : Please see my note below. [Sincerely,] : Sincerely, [Consult Closing:] : Thank you very much for allowing me to participate in the care of this patient.  If you have any questions, please do not hesitate to contact me. [FreeTextEntry3] : Deshawn Vidales MD, FCCP, D. ABSM\par Pulmonary and Sleep Medicine\par [facility] at [place]\par

## 2020-12-08 ENCOUNTER — APPOINTMENT (OUTPATIENT)
Dept: PULMONOLOGY | Facility: CLINIC | Age: 85
End: 2020-12-08
Payer: MEDICARE

## 2020-12-08 VITALS
BODY MASS INDEX: 32.15 KG/M2 | HEIGHT: 57 IN | OXYGEN SATURATION: 96 % | WEIGHT: 149 LBS | SYSTOLIC BLOOD PRESSURE: 124 MMHG | RESPIRATION RATE: 16 BRPM | DIASTOLIC BLOOD PRESSURE: 78 MMHG | HEART RATE: 84 BPM

## 2020-12-08 PROCEDURE — 99072 ADDL SUPL MATRL&STAF TM PHE: CPT

## 2020-12-08 PROCEDURE — 99214 OFFICE O/P EST MOD 30 MIN: CPT

## 2020-12-08 RX ORDER — ALBUTEROL SULFATE 90 UG/1
108 (90 BASE) AEROSOL, METERED RESPIRATORY (INHALATION)
Qty: 3 | Refills: 3 | Status: DISCONTINUED | COMMUNITY
Start: 2020-01-14 | End: 2020-12-08

## 2020-12-08 NOTE — REVIEW OF SYSTEMS
[Hypertension] : ~T hypertension [Thyroid Problem] : thyroid problem [Fever] : no fever [Chills] : no chills [Dry Eyes] : no dryness of the eyes [Eye Irritation] : no ~T irritation of the eyes [Nasal Congestion] : no nasal congestion [Epistaxis] : no nosebleeds [Postnasal Drip] : no postnasal drip [Sinus Problems] : no sinus problems [Cough] : no cough [Sputum] : not coughing up ~M sputum [Hemoptysis] : no hemoptysis [Dyspnea] : no dyspnea [Chest Tightness] : no chest tightness [Pleuritic Pain] : no pleuritic pain [Wheezing] : no wheezing [Chest Discomfort] : no chest discomfort [Dysrhythmia] : no dysrhythmia [Murmurs] : no murmurs were heard [Palpitations] : no palpitations [Edema] : ~T edema was not present [Hay Fever] : no hay fever [Itchy Eyes] : no itching of ~T the eyes [Reflux] : no reflux [Nausea] : no nausea [Vomiting] : no vomiting [Constipation] : no constipation [Diarrhea] : no diarrhea [Abdominal Pain] : no abdominal pain [Dysuria] : no dysuria [Trauma] : no ~T physical trauma [Fracture] : no fracture [Back Pain] : ~T no back pain [Anemia] : no anemia [Headache] : no headache [Dizziness] : no dizziness [Syncope] : no fainting [Numbness] : no numbness [Paralysis] : no paralysis was seen [Seizures] : no seizures [Depression] : no depression [Anxiety] : no anxiety [Diabetes] : no diabetes mellitus [Difficulty Initiating Sleep] : no difficulty falling asleep [Difficulty Maintaining Sleep] : no difficulty maintaining sleep [Snoring] : no snoring [Witnessed Apneas] : demonstrated no ~M apnea [Nonrestorative Sleep] : restorative sleep

## 2020-12-08 NOTE — DISCUSSION/SUMMARY
[FreeTextEntry1] : \par   #1. The patient has evidence of Gold stage II-III COPD for which she will continue her Advair, Spiriva, and p.r.n. albuterol (she prefers Ventolin)\par   #2. I have recommended diet and exercise for weight loss and to improve her exercise tolerance.\par   #3. Diet and exercise for weight loss\par #4. Will follow lung function periodically\par #5. Oncology f/u for breast cancer\par #6. Nebulizer therapy for COPD/wheeze with Albuterol as needed\par #7. She does not appear to require home O2 as she did not desaturate at rest or on exertion previously\par #8. She'll follow up in this office in 4 months for reevaluation with PFTs\par #9. Reviewed risks of exposure and symptoms of Covid-19 virus, including how the virus is spread and precautions to avoid татьяна virus.\par \par Patient's questions were answered and patient appears to understand these recommendations

## 2020-12-08 NOTE — PHYSICAL EXAM
[No Acute Distress] : no acute distress [Well Nourished] : well nourished [Well Developed] : well developed [Normal Appearance] : normal appearance [Supple] : supple [Normal Rate/Rhythm] : normal rate/rhythm [Normal S1, S2] : normal s1, s2 [No Murmurs] : no murmurs [No Resp Distress] : no resp distress [No Acc Muscle Use] : no acc muscle use [Normal Rhythm and Effort] : normal rhythm and effort [Clear to Auscultation Bilaterally] : clear to auscultation bilaterally [No Abnormalities] : no abnormalities [Benign] : benign [Not Tender] : not tender [Soft] : soft [Normal Gait] : normal gait [No Clubbing] : no clubbing [No Cyanosis] : no cyanosis [No Edema] : no edema [No Focal Deficits] : no focal deficits [Oriented x3] : oriented x3

## 2020-12-08 NOTE — CONSULT LETTER
[Dear  ___] : Dear  [unfilled], [Consult Letter:] : I had the pleasure of evaluating your patient, [unfilled]. [Please see my note below.] : Please see my note below. [Consult Closing:] : Thank you very much for allowing me to participate in the care of this patient.  If you have any questions, please do not hesitate to contact me. [Sincerely,] : Sincerely, [FreeTextEntry3] : Deshawn Vidales MD, FCCP, D. ABSM\par Pulmonary and Sleep Medicine\par [facility] at [place]\par

## 2020-12-21 PROBLEM — Z87.09 HISTORY OF ACUTE BRONCHITIS: Status: RESOLVED | Noted: 2019-04-16 | Resolved: 2020-12-21

## 2021-02-08 ENCOUNTER — RX RENEWAL (OUTPATIENT)
Age: 86
End: 2021-02-08

## 2021-03-30 ENCOUNTER — RX RENEWAL (OUTPATIENT)
Age: 86
End: 2021-03-30

## 2021-04-09 ENCOUNTER — APPOINTMENT (OUTPATIENT)
Dept: DISASTER EMERGENCY | Facility: CLINIC | Age: 86
End: 2021-04-09

## 2021-04-10 LAB — SARS-COV-2 N GENE NPH QL NAA+PROBE: NOT DETECTED

## 2021-04-13 ENCOUNTER — APPOINTMENT (OUTPATIENT)
Dept: PULMONOLOGY | Facility: CLINIC | Age: 86
End: 2021-04-13
Payer: MEDICARE

## 2021-04-13 VITALS — WEIGHT: 154 LBS | BODY MASS INDEX: 31.04 KG/M2 | TEMPERATURE: 98.2 F | HEIGHT: 59 IN

## 2021-04-13 PROCEDURE — 99214 OFFICE O/P EST MOD 30 MIN: CPT | Mod: 25

## 2021-04-13 PROCEDURE — 85018 HEMOGLOBIN: CPT | Mod: QW

## 2021-04-13 PROCEDURE — 99072 ADDL SUPL MATRL&STAF TM PHE: CPT

## 2021-04-13 PROCEDURE — 94727 GAS DIL/WSHOT DETER LNG VOL: CPT

## 2021-04-13 PROCEDURE — 94010 BREATHING CAPACITY TEST: CPT

## 2021-04-13 NOTE — PROCEDURE
[FreeTextEntry1] : PFTs performed previously revealed moderate COPD and was unchanged when c/w her previous. Lung volumes are normal and could not assess diffusion capacity.\par Spirometry subsequently was c/w mild to moderate COPD and was without significant change when c/w her previous study\par PFTs 10/3/18 - Moderately reduced FVC and FEV1 with an obstructive pattern and near normal to mildly reduced lung volumes; unable to assess DLCO; overall lung function is slightly reduced than her previous but near baseline.\par Spirometry 5/17/19 - Moderate COPD and slightly worse than previous.\par Spirometry 8/22/19 - Moderate COPD without significant change\par PFTs 1/10/20 - Moderate COPD slightly worse than previous but with essentially normal lung volumes but diffusion capacity could not be determined\par PFTs 4/13/21 - Moderate COPD with normal lung volumes but could not obtain diffusion diffusion capacity due to low volumes; essentially at baseline with slow deterioration over the past 6 years

## 2021-04-13 NOTE — CONSULT LETTER
[Dear  ___] : Dear  [unfilled], [Consult Letter:] : I had the pleasure of evaluating your patient, [unfilled]. [Please see my note below.] : Please see my note below. [Consult Closing:] : Thank you very much for allowing me to participate in the care of this patient.  If you have any questions, please do not hesitate to contact me. [Sincerely,] : Sincerely, [FreeTextEntry3] : Deshawn Vidales MD, FCCP, D. ABSM\par Pulmonary and Sleep Medicine\par [facility] at [place]\par

## 2021-04-13 NOTE — DISCUSSION/SUMMARY
[FreeTextEntry1] : \par   #1. The patient has evidence of Gold stage II-III COPD for which she will continue her Advair, Spiriva, and p.r.n. albuterol (she prefers Ventolin)\par   #2. I have recommended diet and exercise for weight loss and to improve her exercise tolerance.\par   #3. Diet and exercise for weight loss\par #4. Will follow lung function periodically; currently at baseline\par #5. Oncology f/u for breast cancer as needed\par #6. Nebulizer therapy for COPD/wheeze with Albuterol as needed\par #7. She does not appear to require home O2 as she did not desaturate at rest or on exertion previously\par #8. She'll follow up in this office in 4-6 months for reevaluation\par #9. S/p both covid vaccines\par #10. Reviewed risks of exposure and symptoms of Covid-19 virus, including how the virus is spread and precautions to avoid татьяна virus.\par \par Patient's questions were answered and patient appears to understand these recommendations

## 2021-04-13 NOTE — REVIEW OF SYSTEMS
[Hypertension] : ~T hypertension [Thyroid Problem] : thyroid problem [Fever] : no fever [Chills] : no chills [Dry Eyes] : no dryness of the eyes [Eye Irritation] : no ~T irritation of the eyes [Nasal Congestion] : no nasal congestion [Epistaxis] : no nosebleeds [Postnasal Drip] : no postnasal drip [Cough] : no cough [Sinus Problems] : no sinus problems [Sputum] : not coughing up ~M sputum [Hemoptysis] : no hemoptysis [Dyspnea] : no dyspnea [Chest Tightness] : no chest tightness [Pleuritic Pain] : no pleuritic pain [Wheezing] : no wheezing [Chest Discomfort] : no chest discomfort [Dysrhythmia] : no dysrhythmia [Murmurs] : no murmurs were heard [Palpitations] : no palpitations [Edema] : ~T edema was not present [Hay Fever] : no hay fever [Itchy Eyes] : no itching of ~T the eyes [Reflux] : no reflux [Nausea] : no nausea [Vomiting] : no vomiting [Constipation] : no constipation [Diarrhea] : no diarrhea [Abdominal Pain] : no abdominal pain [Dysuria] : no dysuria [Trauma] : no ~T physical trauma [Fracture] : no fracture [Back Pain] : ~T no back pain [Anemia] : no anemia [Headache] : no headache [Dizziness] : no dizziness [Syncope] : no fainting [Numbness] : no numbness [Paralysis] : no paralysis was seen [Seizures] : no seizures [Depression] : no depression [Anxiety] : no anxiety [Diabetes] : no diabetes mellitus [Difficulty Initiating Sleep] : no difficulty falling asleep [Difficulty Maintaining Sleep] : no difficulty maintaining sleep [Snoring] : no snoring [Witnessed Apneas] : demonstrated no ~M apnea [Nonrestorative Sleep] : restorative sleep

## 2021-07-18 ENCOUNTER — RX RENEWAL (OUTPATIENT)
Age: 86
End: 2021-07-18

## 2021-09-14 ENCOUNTER — APPOINTMENT (OUTPATIENT)
Dept: PULMONOLOGY | Facility: CLINIC | Age: 86
End: 2021-09-14

## 2021-09-17 ENCOUNTER — APPOINTMENT (OUTPATIENT)
Dept: PULMONOLOGY | Facility: CLINIC | Age: 86
End: 2021-09-17
Payer: MEDICARE

## 2021-09-17 VITALS
DIASTOLIC BLOOD PRESSURE: 82 MMHG | SYSTOLIC BLOOD PRESSURE: 158 MMHG | RESPIRATION RATE: 15 BRPM | WEIGHT: 146 LBS | BODY MASS INDEX: 28.66 KG/M2 | OXYGEN SATURATION: 97 % | HEART RATE: 67 BPM | HEIGHT: 60 IN

## 2021-09-17 PROCEDURE — 99214 OFFICE O/P EST MOD 30 MIN: CPT

## 2021-09-17 RX ORDER — ALBUTEROL SULFATE 90 UG/1
108 (90 BASE) AEROSOL, METERED RESPIRATORY (INHALATION)
Qty: 1 | Refills: 5 | Status: DISCONTINUED | COMMUNITY
End: 2021-09-17

## 2021-09-17 RX ORDER — ANASTROZOLE TABLETS 1 MG/1
1 TABLET ORAL
Refills: 0 | Status: DISCONTINUED | COMMUNITY
End: 2021-09-17

## 2021-09-17 NOTE — REASON FOR VISIT
[Follow-Up] : a follow-up visit [COPD] : COPD [Shortness of Breath] : shortness of breath [Obesity] : obesity [Sleep Apnea] : sleep apnea [Family Member] : family member [TextBox_44] : Smoking hx

## 2021-09-17 NOTE — CONSULT LETTER
[Dear  ___] : Dear  [unfilled], [Consult Letter:] : I had the pleasure of evaluating your patient, [unfilled]. [Please see my note below.] : Please see my note below. [Consult Closing:] : Thank you very much for allowing me to participate in the care of this patient.  If you have any questions, please do not hesitate to contact me. [Sincerely,] : Sincerely, [FreeTextEntry3] : Deshawn Vidales MD, FCCP, D. ABSM\par Pulmonary and Sleep Medicine\par [facility] at [place]\par

## 2021-09-17 NOTE — HISTORY OF PRESENT ILLNESS
[Stable] : stable [Difficulty Breathing During Exertion] : stable dyspnea on exertion [Cough] : denies coughing [Feelings Of Weakness On Exertion] : stable exercise intolerance [Coughing Up Sputum] : denies coughing up sputum [Wheezing] : denies wheezing [Regional Soft Tissue Swelling Both Lower Extremities] : denies lower extremity edema [Adherent] : the patient is adherent with ~his/her~ medication regimen [Goals--Doing Well] : the patient is doing well with ~his/her~ COPD goals [Excess Weight] : excess weight [Inability to Lose Weight] : inability to lose weight [Low Calorie Diet] : low calorie diet [Poor Compliance] : poor compliance with treatment [Poor Tolerance] : poor tolerance of treatment [Poor Symptom Control] : poor symptom control [Hypertension] : hypertension [High] : high [Low Calorie] : low calorie [Well Balanced Diet] : well balanced meals [Sedentary] : is sedentary [Dyspnea at Rest] : dyspnea at rest [Short-Acting Beta Agonists] : short-acting beta agonists [Long-Acting Beta Agonists] : long-acting beta agonists [Anticholinergics (Inhalation)] : inhaled anticholinergics [Good Compliance] : good compliance with treatment [Good Tolerance] : good tolerance of treatment [Good Symptom Control] : good symptom control [Follow-Up - Routine Clinic] : a routine clinic follow-up of [Excessive Daytime Sleepiness] : excessive daytime sleepiness [Snoring] : snoring [Unrefreshing Sleep] : unrefreshing sleep [Sleepy When Sedentary] : sleepy when sedentary [Currently Experiencing] : The patient is currently experiencing symptoms. [None] : The patient is not currently being treated for this problem [FreeTextEntry1] : Patient reports increased SOB especially by mid day but otherwise without cough, fevers, chills or other respiratory complaints.\par  [Productive Cough] : no productive cough

## 2021-09-24 ENCOUNTER — OUTPATIENT (OUTPATIENT)
Dept: OUTPATIENT SERVICES | Facility: HOSPITAL | Age: 86
LOS: 1 days | End: 2021-09-24
Payer: MEDICARE

## 2021-09-24 ENCOUNTER — APPOINTMENT (OUTPATIENT)
Age: 86
End: 2021-09-24
Payer: MEDICARE

## 2021-09-24 DIAGNOSIS — G47.33 OBSTRUCTIVE SLEEP APNEA (ADULT) (PEDIATRIC): ICD-10-CM

## 2021-09-24 PROCEDURE — 95806 SLEEP STUDY UNATT&RESP EFFT: CPT | Mod: 26

## 2021-09-24 PROCEDURE — 95806 SLEEP STUDY UNATT&RESP EFFT: CPT

## 2021-10-14 ENCOUNTER — APPOINTMENT (OUTPATIENT)
Dept: PULMONOLOGY | Facility: CLINIC | Age: 86
End: 2021-10-14
Payer: MEDICARE

## 2021-10-14 VITALS
WEIGHT: 164 LBS | OXYGEN SATURATION: 96 % | SYSTOLIC BLOOD PRESSURE: 126 MMHG | HEART RATE: 75 BPM | DIASTOLIC BLOOD PRESSURE: 82 MMHG | BODY MASS INDEX: 32.03 KG/M2

## 2021-10-14 PROCEDURE — 99214 OFFICE O/P EST MOD 30 MIN: CPT

## 2021-10-14 RX ORDER — FLUTICASONE PROPION/SALMETEROL 500-50 MCG
BLISTER, WITH INHALATION DEVICE INHALATION
Refills: 0 | Status: DISCONTINUED | COMMUNITY
End: 2021-10-14

## 2021-10-14 NOTE — HISTORY OF PRESENT ILLNESS
[Dyspnea at Rest] : dyspnea at rest [Short-Acting Beta Agonists] : short-acting beta agonists [Long-Acting Beta Agonists] : long-acting beta agonists [Anticholinergics (Inhalation)] : inhaled anticholinergics [Good Compliance] : good compliance with treatment [Good Tolerance] : good tolerance of treatment [Good Symptom Control] : good symptom control [Excess Weight] : excess weight [Inability to Lose Weight] : inability to lose weight [Low Calorie Diet] : low calorie diet [Poor Compliance] : poor compliance with treatment [Poor Tolerance] : poor tolerance of treatment [Poor Symptom Control] : poor symptom control [Hypertension] : hypertension [High] : high [Low Calorie] : low calorie [Well Balanced Diet] : well balanced meals [Sedentary] : is sedentary [Follow-Up - Routine Clinic] : a routine clinic follow-up of [Excessive Daytime Sleepiness] : excessive daytime sleepiness [Snoring] : snoring [Unrefreshing Sleep] : unrefreshing sleep [Sleepy When Sedentary] : sleepy when sedentary [Currently Experiencing] : The patient is currently experiencing symptoms. [None] : The patient is not currently being treated for this problem [FreeTextEntry1] : Patient reports increased SOB especially by mid day and with exertion but otherwise without cough, fevers, chills or other respiratory complaints.\par  [Productive Cough] : no productive cough

## 2021-10-14 NOTE — REASON FOR VISIT
[Follow-Up] : a follow-up visit [Sleep Apnea] : sleep apnea [COPD] : COPD [Shortness of Breath] : shortness of breath [Obesity] : obesity [TextBox_44] : Smoking hx

## 2021-10-14 NOTE — CONSULT LETTER
[Dear  ___] : Dear  [unfilled], [Consult Letter:] : I had the pleasure of evaluating your patient, [unfilled]. [Please see my note below.] : Please see my note below. [Consult Closing:] : Thank you very much for allowing me to participate in the care of this patient.  If you have any questions, please do not hesitate to contact me. [Sincerely,] : Sincerely, [FreeTextEntry3] : Deshawn Vidales MD, FCCP, D. ABSM\par Pulmonary and Sleep Medicine\par [facility] at [place]\par

## 2021-10-14 NOTE — DISCUSSION/SUMMARY
[FreeTextEntry1] : \par   #1. The patient has evidence of Gold stage II-III COPD for which she will continue her Advair, Spiriva, and p.r.n. albuterol (she prefers Ventolin)\par   #2. I have recommended diet and exercise for weight loss and to improve her exercise tolerance.\par   #3. Diet and exercise for weight loss\par #4. Will follow lung function periodically; currently at baseline\par #5. Oncology f/u for breast cancer as needed\par #6. Nebulizer therapy for COPD/wheeze with Albuterol as needed\par #7. She does not appear to require home O2 as she did not desaturate at rest or on exertion previously\par #8. Start autoCPAP to treat mild LAYO with an AHI of 9.5 with significant desaturations; encouraged compliance and would consider nocturnal oximetry with therapy to ensure resolution of nocturnal hypoxia\par #9. S/p both covid vaccines; Flu vaccine was given per patient's request on 10/14/21\par #10. F/u one month after starting CPAP therapy with compliance \par #11. Reviewed risks of exposure and symptoms of Covid-19 virus, including how the virus is spread and precautions to avoid татьяна virus.\par \par Patient's questions were answered and patient appears to understand these recommendations

## 2022-09-28 ENCOUNTER — RX RENEWAL (OUTPATIENT)
Age: 87
End: 2022-09-28

## 2023-05-19 ENCOUNTER — NON-APPOINTMENT (OUTPATIENT)
Age: 88
End: 2023-05-19

## 2023-06-15 ENCOUNTER — APPOINTMENT (OUTPATIENT)
Dept: PULMONOLOGY | Facility: CLINIC | Age: 88
End: 2023-06-15
Payer: MEDICARE

## 2023-06-15 VITALS
WEIGHT: 147 LBS | OXYGEN SATURATION: 96 % | RESPIRATION RATE: 15 BRPM | DIASTOLIC BLOOD PRESSURE: 78 MMHG | BODY MASS INDEX: 31.28 KG/M2 | HEIGHT: 57.5 IN | HEART RATE: 81 BPM | SYSTOLIC BLOOD PRESSURE: 130 MMHG

## 2023-06-15 PROCEDURE — 99215 OFFICE O/P EST HI 40 MIN: CPT

## 2023-06-15 NOTE — HISTORY OF PRESENT ILLNESS
[Dyspnea at Rest] : dyspnea at rest [Short-Acting Beta Agonists] : short-acting beta agonists [Long-Acting Beta Agonists] : long-acting beta agonists [Anticholinergics (Inhalation)] : inhaled anticholinergics [Good Compliance] : good compliance with treatment [Good Tolerance] : good tolerance of treatment [Good Symptom Control] : good symptom control [Excess Weight] : excess weight [Inability to Lose Weight] : inability to lose weight [Low Calorie Diet] : low calorie diet [Poor Compliance] : poor compliance with treatment [Poor Tolerance] : poor tolerance of treatment [Poor Symptom Control] : poor symptom control [Hypertension] : hypertension [High] : high [Low Calorie] : low calorie [Well Balanced Diet] : well balanced meals [Sedentary] : is sedentary [Follow-Up - Routine Clinic] : a routine clinic follow-up of [Excessive Daytime Sleepiness] : excessive daytime sleepiness [Snoring] : snoring [Unrefreshing Sleep] : unrefreshing sleep [Sleepy When Sedentary] : sleepy when sedentary [Currently Experiencing] : The patient is currently experiencing symptoms. [None] : The patient is not currently being treated for this problem [TextBox_4] : Former smoker of up to 2.5 ppd x 30 years, quit 1984\par Patient reports increased SOB especially by mid day and with exertion but otherwise without cough, fevers, chills or other respiratory complaints.\par Pt last seen 10/2021\par Still works at [email or web address]  [FreeTextEntry1] : \par  [Productive Cough] : no productive cough

## 2023-06-15 NOTE — REVIEW OF SYSTEMS
[Fatigue] : fatigue [SOB on Exertion] : sob on exertion [Seasonal Allergies] : seasonal allergies [Thyroid Problem] : thyroid problem [Obesity] : obesity [Negative] : Psychiatric

## 2023-06-15 NOTE — DISCUSSION/SUMMARY
[FreeTextEntry1] : \par   #1. The patient has evidence of Gold stage II-III COPD for which she will continue her Advair 250, Spiriva, and p.r.n. albuterol (she prefers Ventolin)\par   #2. I have recommended diet and exercise for weight loss and to improve her exercise tolerance.\par   #3. Diet and exercise for weight loss\par #4. Will follow lung function periodically; last was at baseline\par #5. Oncology f/u for breast cancer as needed\par #6. Nebulizer therapy for COPD/wheeze with Albuterol as needed; Rx new nebulizer\par #7. She does not appear to require home O2 as she did not desaturate at rest or on exertion previously\par #8. Rec autoCPAP to treat mild LAYO with an AHI of 9.5 with significant desaturations in the past but pt refused and does not want to pursue further w/u of her LAYO. The patient understands the risks of untreated LAYO including heart disease, strokes, hypertension, pulmonary hypertension, and motor vehicle accidents as well as the need for treatment and weight loss; consider nocturnal oximetry if decides to pursue to ensure resolution of nocturnal hypoxia\par #9. S/p both covid vaccines and booster\par #10. F/u in 2 months with PFTs and CXR\par #11. Reviewed risks of exposure and symptoms of Covid-19 virus, including how the virus is spread and precautions to avoid татьяна virus.\par \par The patient expressed understanding and agreement with the above recommendations/plan and accepts responsibility to be compliant with recommended testing, therapies, and f/u visits.\par All relevant questions and concerns were addressed.

## 2023-06-15 NOTE — CONSULT LETTER
[Dear  ___] : Dear  [unfilled], [Consult Letter:] : I had the pleasure of evaluating your patient, [unfilled]. [Please see my note below.] : Please see my note below. [Consult Closing:] : Thank you very much for allowing me to participate in the care of this patient.  If you have any questions, please do not hesitate to contact me. [Sincerely,] : Sincerely, [FreeTextEntry3] : Deshawn Vidales MD, FCCP, D. ABSM\par Pulmonary and Sleep Medicine\par [facility] [place]\par

## 2023-06-29 ENCOUNTER — APPOINTMENT (OUTPATIENT)
Dept: CARDIOLOGY | Facility: CLINIC | Age: 88
End: 2023-06-29
Payer: MEDICARE

## 2023-06-29 ENCOUNTER — NON-APPOINTMENT (OUTPATIENT)
Age: 88
End: 2023-06-29

## 2023-06-29 VITALS
OXYGEN SATURATION: 96 % | HEART RATE: 71 BPM | DIASTOLIC BLOOD PRESSURE: 52 MMHG | WEIGHT: 148 LBS | TEMPERATURE: 98.1 F | BODY MASS INDEX: 31.47 KG/M2 | SYSTOLIC BLOOD PRESSURE: 148 MMHG

## 2023-06-29 PROCEDURE — 93000 ELECTROCARDIOGRAM COMPLETE: CPT

## 2023-06-29 PROCEDURE — 99205 OFFICE O/P NEW HI 60 MIN: CPT | Mod: 25

## 2023-06-29 NOTE — DISCUSSION/SUMMARY
[FreeTextEntry1] : Ms. CORRINE GARAY is a very pleasant 88 year woman with a past medical history of HTN, COPD, dyslipidemia presenting for evaluation of shortness of breath with exertion. \par \par #Shortness of breath:\par - concerning for possibly diastolic HF given COPD, HTN, orthopnea\par - check echocardiogram\par - if normal will check stress echo for possibility of underlying CAD\par - continue COPD inhalers\par \par #HTN: not controlled. Took meds today\par - 2/2 white coat HTN\par - if elevated next visit will switch bisoprolol/HCTz to more potent antihypertensives. \par \par #HLD: on zetia\par - checking lipids for next visit\par \par Patient was advised to partake in 150 minutes of moderate exercise per week.\par Patient was advised to see us in 6 months if stable and certainly earlier with any new symptoms.\par Patient was advised to contact the office or seek medical care for any new or concerning symptoms right away.  Patient verbalized understanding and is in agreement with the above plan.\par  [EKG obtained to assist in diagnosis and management of assessed problem(s)] : EKG obtained to assist in diagnosis and management of assessed problem(s)

## 2023-06-29 NOTE — HISTORY OF PRESENT ILLNESS
[FreeTextEntry1] : Ms. CORRINE GARAY is a very pleasant 88 year woman with a past medical history of HTN, COPD presenting for evaluation of dyspnea on exertion. She just saw Dr. Vidales for copd and was told resp status is stable. Despite this she cannot walk around the grocery store without having to lean on the cart given her SOB. She endoreses orthopnea and props herself up at night to sleep. No chest pain or lower extremity edema. Previous smoker. No cardiac history ortherwise. \par \par BP elevated today, taking her bisoprolol/HCTZ combo pill daily.\par \par ECG today shows signs of LVH with nonspecific t wave abnormality.

## 2023-09-07 ENCOUNTER — APPOINTMENT (OUTPATIENT)
Dept: PULMONOLOGY | Facility: CLINIC | Age: 88
End: 2023-09-07
Payer: MEDICARE

## 2023-09-07 VITALS
OXYGEN SATURATION: 97 % | SYSTOLIC BLOOD PRESSURE: 132 MMHG | HEART RATE: 65 BPM | DIASTOLIC BLOOD PRESSURE: 76 MMHG | RESPIRATION RATE: 16 BRPM

## 2023-09-07 VITALS — BODY MASS INDEX: 31.71 KG/M2 | WEIGHT: 149 LBS | HEIGHT: 57.5 IN

## 2023-09-07 PROCEDURE — 99214 OFFICE O/P EST MOD 30 MIN: CPT | Mod: 25

## 2023-09-07 PROCEDURE — 94010 BREATHING CAPACITY TEST: CPT

## 2023-09-07 PROCEDURE — 94727 GAS DIL/WSHOT DETER LNG VOL: CPT

## 2023-09-07 PROCEDURE — 94729 DIFFUSING CAPACITY: CPT

## 2023-09-07 PROCEDURE — 85018 HEMOGLOBIN: CPT | Mod: QW

## 2023-09-07 NOTE — CONSULT LETTER
[Dear  ___] : Dear  [unfilled], [Consult Letter:] : I had the pleasure of evaluating your patient, [unfilled]. [Please see my note below.] : Please see my note below. [Consult Closing:] : Thank you very much for allowing me to participate in the care of this patient.  If you have any questions, please do not hesitate to contact me. [Sincerely,] : Sincerely, [FreeTextEntry3] : Deshawn Vidales MD, FCCP, D. ABSM\par  Pulmonary and Sleep Medicine\par  [facility] [place]\par

## 2023-09-07 NOTE — DISCUSSION/SUMMARY
[FreeTextEntry1] :   #1. The patient has evidence of Gold stage II-III COPD for which she will continue her Advair 250, Spiriva, and p.r.n. albuterol (she prefers Ventolin)   #2. I have recommended diet and exercise for weight loss and to improve her exercise tolerance.   #3. Diet and exercise for weight loss #4. Will follow lung function periodically; last was at baseline #5. Oncology f/u for breast cancer as needed #6. Nebulizer therapy for COPD/wheeze with Albuterol as needed; Rx new nebulizer #7. She does not appear to require home O2 as she did not desaturate at rest or on exertion previously #8. Rec autoCPAP to treat mild LAYO with an AHI of 9.5 with significant desaturations in the past but pt refused and does not want to pursue further w/u of her LAYO. The patient understands the risks of untreated LAYO including heart disease, strokes, hypertension, pulmonary hypertension, and motor vehicle accidents as well as the need for treatment and weight loss; consider nocturnal oximetry if decides to pursue to ensure resolution of nocturnal hypoxia but does not want further w/u for now. #9. S/p both covid vaccines and booster #10. F/u in 6 months  The patient expressed understanding and agreement with the above recommendations/plan and accepts responsibility to be compliant with recommended testing, therapies, and f/u visits. All relevant questions and concerns were addressed.

## 2023-09-07 NOTE — END OF VISIT
[Time Spent: ___ minutes] : I have spent [unfilled] minutes of time on the encounter. [TextEntry] : Discussed with pt at length regarding COPD, obesity, SOBOE, evaluation for home O2 in the past; reviewed CXR and HST with pt.

## 2023-09-07 NOTE — PROCEDURE
[FreeTextEntry1] : PFTs performed previously revealed moderate COPD and was unchanged when c/w her previous. Lung volumes are normal and could not assess diffusion capacity. Spirometry subsequently was c/w mild to moderate COPD and was without significant change when c/w her previous study PFTs 10/3/18 - Moderately reduced FVC and FEV1 with an obstructive pattern and near normal to mildly reduced lung volumes; unable to assess DLCO; overall lung function is slightly reduced than her previous but near baseline. Spirometry 5/17/19 - Moderate COPD and slightly worse than previous. Spirometry 8/22/19 - Moderate COPD without significant change PFTs 1/10/20 - Moderate COPD slightly worse than previous but with essentially normal lung volumes but diffusion capacity could not be determined PFTs 4/13/21 - Moderate COPD with normal lung volumes but could not obtain DLCO due to low volumes; essentially at baseline with slow deterioration over the past 6 years PFTs 9/7/23 - Mildly reduced FVC with moderately reduced FEV1 with obstruction with mildly reduced lung volumes but normal TLC; could not obtain DLCO; overall slow decline.

## 2023-09-07 NOTE — VITALS
[TextEntry] : Ex-ox from 1/10/20:The patient was 97% on RA at rest and desaturated only to 94% on RA with exertion; she does not require home O2  Ex-ox from 10/14/21:The patient was 96% on RA at rest and desaturated to 92% on RA with exertion.  Ex-ox from 6/15/23: The patient was 95% on RA at rest and desaturated to 94% on RA with exertion but without increase in HR despite increased SOB.

## 2023-09-07 NOTE — HISTORY OF PRESENT ILLNESS
[Dyspnea at Rest] : dyspnea at rest [Short-Acting Beta Agonists] : short-acting beta agonists [Long-Acting Beta Agonists] : long-acting beta agonists [Anticholinergics (Inhalation)] : inhaled anticholinergics [Good Compliance] : good compliance with treatment [Good Tolerance] : good tolerance of treatment [Good Symptom Control] : good symptom control [Excess Weight] : excess weight [Inability to Lose Weight] : inability to lose weight [Low Calorie Diet] : low calorie diet [Poor Compliance] : poor compliance with treatment [Poor Tolerance] : poor tolerance of treatment [Poor Symptom Control] : poor symptom control [Hypertension] : hypertension [High] : high [Low Calorie] : low calorie [Well Balanced Diet] : well balanced meals [Sedentary] : is sedentary [Follow-Up - Routine Clinic] : a routine clinic follow-up of [Excessive Daytime Sleepiness] : excessive daytime sleepiness [Snoring] : snoring [Unrefreshing Sleep] : unrefreshing sleep [Sleepy When Sedentary] : sleepy when sedentary [Currently Experiencing] : The patient is currently experiencing symptoms. [None] : The patient is not currently being treated for this problem [TextBox_4] : Former smoker of up to 2.5 ppd x 30 years, quit 1985 Patient reports increased SOB especially by mid day and with exertion but otherwise without cough, fevers, chills or other respiratory complaints. Pt last seen 10/2021 Still works at [employer]  [FreeTextEntry1] : \par   [Productive Cough] : no productive cough

## 2023-09-11 RX ORDER — ALBUTEROL SULFATE 90 UG/1
108 (90 BASE) AEROSOL, METERED RESPIRATORY (INHALATION)
Qty: 3 | Refills: 3 | Status: DISCONTINUED | COMMUNITY
Start: 1900-01-01 | End: 2023-09-11

## 2023-11-02 ENCOUNTER — APPOINTMENT (OUTPATIENT)
Dept: CARDIOLOGY | Facility: CLINIC | Age: 88
End: 2023-11-02
Payer: MEDICARE

## 2023-11-02 VITALS
HEART RATE: 74 BPM | DIASTOLIC BLOOD PRESSURE: 62 MMHG | OXYGEN SATURATION: 97 % | HEIGHT: 57.5 IN | BODY MASS INDEX: 31.28 KG/M2 | SYSTOLIC BLOOD PRESSURE: 128 MMHG | WEIGHT: 147 LBS | TEMPERATURE: 98.1 F

## 2023-11-02 DIAGNOSIS — Z01.818 ENCOUNTER FOR OTHER PREPROCEDURAL EXAMINATION: ICD-10-CM

## 2023-11-02 PROCEDURE — 93306 TTE W/DOPPLER COMPLETE: CPT

## 2023-11-02 PROCEDURE — 99214 OFFICE O/P EST MOD 30 MIN: CPT

## 2023-11-02 RX ORDER — ALBUTEROL 90 MCG
90 AEROSOL (GRAM) INHALATION
Refills: 0 | Status: ACTIVE | COMMUNITY

## 2023-11-02 RX ORDER — ALBUTEROL SULFATE 90 UG/1
108 (90 BASE) INHALANT RESPIRATORY (INHALATION) EVERY 4 HOURS
Qty: 3 | Refills: 3 | Status: DISCONTINUED | COMMUNITY
Start: 2023-09-11 | End: 2023-11-02

## 2023-11-02 RX ORDER — FLUTICASONE PROPIONATE AND SALMETEROL 250; 50 UG/1; UG/1
250-50 POWDER RESPIRATORY (INHALATION)
Qty: 3 | Refills: 3 | Status: DISCONTINUED | COMMUNITY
Start: 2022-09-28 | End: 2023-11-02

## 2023-11-09 ENCOUNTER — APPOINTMENT (OUTPATIENT)
Dept: CARDIOLOGY | Facility: CLINIC | Age: 88
End: 2023-11-09
Payer: MEDICARE

## 2023-11-09 VITALS — DIASTOLIC BLOOD PRESSURE: 70 MMHG | HEART RATE: 69 BPM | OXYGEN SATURATION: 95 % | SYSTOLIC BLOOD PRESSURE: 150 MMHG

## 2023-11-09 VITALS — WEIGHT: 146 LBS | BODY MASS INDEX: 31.05 KG/M2

## 2023-11-09 PROCEDURE — 99214 OFFICE O/P EST MOD 30 MIN: CPT

## 2023-11-09 RX ORDER — FUROSEMIDE 40 MG/1
40 TABLET ORAL
Qty: 90 | Refills: 1 | Status: DISCONTINUED | COMMUNITY
Start: 2023-11-02 | End: 2023-11-09

## 2023-11-14 RX ORDER — ALBUTEROL SULFATE 2.5 MG/3ML
(2.5 MG/3ML) SOLUTION RESPIRATORY (INHALATION)
Qty: 1620 | Refills: 3 | Status: ACTIVE | COMMUNITY
Start: 2019-08-22 | End: 1900-01-01

## 2023-12-18 ENCOUNTER — APPOINTMENT (OUTPATIENT)
Dept: CARDIOLOGY | Facility: CLINIC | Age: 88
End: 2023-12-18
Payer: MEDICARE

## 2023-12-18 VITALS
WEIGHT: 149 LBS | DIASTOLIC BLOOD PRESSURE: 70 MMHG | HEIGHT: 57.5 IN | BODY MASS INDEX: 31.71 KG/M2 | SYSTOLIC BLOOD PRESSURE: 152 MMHG | OXYGEN SATURATION: 95 % | HEART RATE: 69 BPM

## 2023-12-18 PROCEDURE — 99214 OFFICE O/P EST MOD 30 MIN: CPT

## 2023-12-18 RX ORDER — EMPAGLIFLOZIN 10 MG/1
10 TABLET, FILM COATED ORAL DAILY
Qty: 90 | Refills: 3 | Status: DISCONTINUED | COMMUNITY
Start: 2023-11-09 | End: 2023-12-18

## 2023-12-18 NOTE — DISCUSSION/SUMMARY
[FreeTextEntry1] : Ms. CORRINE GARAY is a very pleasant 88 year woman with a past medical history of HTN, COPD, dyslipidemia presenting for evaluation of shortness of breath with exertion.   #acute on chronic diastolic HF - NYHA II-III symptoms - echo with G2DD and elevated LA size.  - Unable to start SGLT2 due to cost - Start losartan 50 mg daily - stop lasix as it is not helping, appears euvolemic - fuv in one month for symptoms and BMP check  #HTN: not controlled. Took meds today - STart losartan - continue bisoprolol/HCTZ  #HLD: on zetia - checking lipids for next visit  FU 6 mo

## 2023-12-18 NOTE — HISTORY OF PRESENT ILLNESS
[FreeTextEntry1] : Ms. CORRINE GARAY is a very pleasant 88 year woman with a past medical history of HTN, COPD presenting for evaluation of dyspnea on exertion. She just saw Dr. Vidales for copd and was told resp status is stable. Despite this she cannot walk around the grocery store without having to lean on the cart given her SOB. She endoreses orthopnea and props herself up at night to sleep. No chest pain or lower extremity edema. Previous smoker. No cardiac history ortherwise.   BP elevated today, taking her bisoprolol/HCTZ combo pill daily.  ECG today shows signs of LVH with nonspecific t wave abnormality.   11/2023 Presents for fuv. Furosemide not making her pee. Feels short of breath still. Had an echocardiogram that showed normal LVEF but G2 DD and dilated LA consistent with elevated filling pressures.   No chest pain.   12/2023 Presents today for follow up. She could not get jardiance as it was too expensive. Feels well. BP remains elevated.   Otherwise, denies orthopnea, paroxysmal nocturnal dyspnea, lower extremity edema, unexplained weight gain or dyspnea on exertion.

## 2024-01-04 LAB
ANION GAP SERPL CALC-SCNC: 12 MMOL/L
BUN SERPL-MCNC: 37 MG/DL
CALCIUM SERPL-MCNC: 9.5 MG/DL
CHLORIDE SERPL-SCNC: 98 MMOL/L
CO2 SERPL-SCNC: 30 MMOL/L
CREAT SERPL-MCNC: 1.39 MG/DL
EGFR: 36 ML/MIN/1.73M2
GLUCOSE SERPL-MCNC: 88 MG/DL
POTASSIUM SERPL-SCNC: 4.4 MMOL/L
SODIUM SERPL-SCNC: 139 MMOL/L

## 2024-01-04 NOTE — HISTORY OF PRESENT ILLNESS
[FreeTextEntry1] : Ms. CORRINE GARAY is a very pleasant 88 year woman with a past medical history of HTN, COPD presenting for evaluation of dyspnea on exertion. She just saw Dr. Vidales for copd and was told resp status is stable. Despite this she cannot walk around the grocery store without having to lean on the cart given her SOB. She endoreses orthopnea and props herself up at night to sleep. No chest pain or lower extremity edema. Previous smoker. No cardiac history ortherwise.   BP elevated today, taking her bisoprolol/HCTZ combo pill daily.  ECG today shows signs of LVH with nonspecific t wave abnormality.   11/2023 Presents today for fuv. She feels well overall but has noticed lower extremity edema. She has mild dyspnea on exertion but otherwise no issues. Denies chest pain. BP well controlled.   Otherwise, denies orthopnea, paroxysmal nocturnal dyspnea, lower extremity edema, unexplained weight gain or dyspnea on exertion.

## 2024-01-04 NOTE — DISCUSSION/SUMMARY
[FreeTextEntry1] : Ms. CORRINE GARYA is a very pleasant 88 year woman with a past medical history of HTN, COPD, dyslipidemia presenting for evaluation of shortness of breath with exertion.   #CYRUS: has grade II diastolic dysfunction - start lasix - echo without systolic dysfunction - bmp and return in one week to check on diuresis  #HTN: not controlled. Took meds today - 2/2 white coat HTN - if elevated next visit will switch bisoprolol/HCTz to more potent antihypertensives.   #HLD: on zetia - checking lipids for next visit  Patient was advised to partake in 150 minutes of moderate exercise per week. Patient was advised to see us in 6 months if stable and certainly earlier with any new symptoms. Patient was advised to contact the office or seek medical care for any new or concerning symptoms right away.  Patient verbalized understanding and is in agreement with the above plan.

## 2024-02-15 ENCOUNTER — INPATIENT (INPATIENT)
Facility: HOSPITAL | Age: 89
LOS: 3 days | Discharge: HOME | DRG: 189 | End: 2024-02-19
Attending: STUDENT IN AN ORGANIZED HEALTH CARE EDUCATION/TRAINING PROGRAM | Admitting: EMERGENCY MEDICINE
Payer: MEDICARE

## 2024-02-15 VITALS
DIASTOLIC BLOOD PRESSURE: 89 MMHG | HEIGHT: 60 IN | HEART RATE: 109 BPM | WEIGHT: 149.91 LBS | OXYGEN SATURATION: 90 % | SYSTOLIC BLOOD PRESSURE: 214 MMHG | TEMPERATURE: 100 F | RESPIRATION RATE: 20 BRPM

## 2024-02-15 DIAGNOSIS — J44.1 CHRONIC OBSTRUCTIVE PULMONARY DISEASE WITH (ACUTE) EXACERBATION: ICD-10-CM

## 2024-02-15 LAB
ALBUMIN SERPL ELPH-MCNC: 3.8 G/DL — SIGNIFICANT CHANGE UP (ref 3.3–5.2)
ALP SERPL-CCNC: 64 U/L — SIGNIFICANT CHANGE UP (ref 40–120)
ALT FLD-CCNC: 20 U/L — SIGNIFICANT CHANGE UP
ANION GAP SERPL CALC-SCNC: 16 MMOL/L — SIGNIFICANT CHANGE UP (ref 5–17)
APTT BLD: 30 SEC — SIGNIFICANT CHANGE UP (ref 24.5–35.6)
AST SERPL-CCNC: 47 U/L — HIGH
B PERT DNA SPEC QL NAA+PROBE: SIGNIFICANT CHANGE UP
BASE EXCESS BLDV CALC-SCNC: 2.7 MMOL/L — SIGNIFICANT CHANGE UP (ref -2–3)
BASOPHILS # BLD AUTO: 0.02 K/UL — SIGNIFICANT CHANGE UP (ref 0–0.2)
BASOPHILS NFR BLD AUTO: 0.2 % — SIGNIFICANT CHANGE UP (ref 0–2)
BILIRUB SERPL-MCNC: 0.6 MG/DL — SIGNIFICANT CHANGE UP (ref 0.4–2)
BUN SERPL-MCNC: 16.1 MG/DL — SIGNIFICANT CHANGE UP (ref 8–20)
C PNEUM DNA SPEC QL NAA+PROBE: SIGNIFICANT CHANGE UP
CA-I SERPL-SCNC: 1.07 MMOL/L — LOW (ref 1.15–1.33)
CALCIUM SERPL-MCNC: 9.3 MG/DL — SIGNIFICANT CHANGE UP (ref 8.4–10.5)
CHLORIDE BLDV-SCNC: 100 MMOL/L — SIGNIFICANT CHANGE UP (ref 96–108)
CHLORIDE SERPL-SCNC: 98 MMOL/L — SIGNIFICANT CHANGE UP (ref 96–108)
CO2 SERPL-SCNC: 24 MMOL/L — SIGNIFICANT CHANGE UP (ref 22–29)
CREAT SERPL-MCNC: 0.96 MG/DL — SIGNIFICANT CHANGE UP (ref 0.5–1.3)
EGFR: 57 ML/MIN/1.73M2 — LOW
EOSINOPHIL # BLD AUTO: 0.01 K/UL — SIGNIFICANT CHANGE UP (ref 0–0.5)
EOSINOPHIL NFR BLD AUTO: 0.1 % — SIGNIFICANT CHANGE UP (ref 0–6)
FLUAV H1 2009 PAND RNA SPEC QL NAA+PROBE: SIGNIFICANT CHANGE UP
FLUAV H1 RNA SPEC QL NAA+PROBE: SIGNIFICANT CHANGE UP
FLUAV H3 RNA SPEC QL NAA+PROBE: DETECTED
FLUAV SUBTYP SPEC NAA+PROBE: DETECTED
FLUBV RNA SPEC QL NAA+PROBE: SIGNIFICANT CHANGE UP
GAS PNL BLDV: 134 MMOL/L — LOW (ref 136–145)
GAS PNL BLDV: SIGNIFICANT CHANGE UP
GAS PNL BLDV: SIGNIFICANT CHANGE UP
GLUCOSE BLDV-MCNC: 108 MG/DL — HIGH (ref 70–99)
GLUCOSE SERPL-MCNC: 105 MG/DL — HIGH (ref 70–99)
HADV DNA SPEC QL NAA+PROBE: SIGNIFICANT CHANGE UP
HCO3 BLDV-SCNC: 27 MMOL/L — SIGNIFICANT CHANGE UP (ref 22–29)
HCOV PNL SPEC NAA+PROBE: SIGNIFICANT CHANGE UP
HCT VFR BLD CALC: 44.4 % — SIGNIFICANT CHANGE UP (ref 34.5–45)
HCT VFR BLDA CALC: 44 % — SIGNIFICANT CHANGE UP
HGB BLD CALC-MCNC: 14.6 G/DL — SIGNIFICANT CHANGE UP (ref 11.7–16.1)
HGB BLD-MCNC: 14.6 G/DL — SIGNIFICANT CHANGE UP (ref 11.5–15.5)
HMPV RNA SPEC QL NAA+PROBE: SIGNIFICANT CHANGE UP
HPIV1 RNA SPEC QL NAA+PROBE: SIGNIFICANT CHANGE UP
HPIV2 RNA SPEC QL NAA+PROBE: SIGNIFICANT CHANGE UP
HPIV3 RNA SPEC QL NAA+PROBE: SIGNIFICANT CHANGE UP
HPIV4 RNA SPEC QL NAA+PROBE: SIGNIFICANT CHANGE UP
IMM GRANULOCYTES NFR BLD AUTO: 0.3 % — SIGNIFICANT CHANGE UP (ref 0–0.9)
INR BLD: 1.06 RATIO — SIGNIFICANT CHANGE UP (ref 0.85–1.18)
LACTATE BLDV-MCNC: 2 MMOL/L — SIGNIFICANT CHANGE UP (ref 0.5–2)
LYMPHOCYTES # BLD AUTO: 0.5 K/UL — LOW (ref 1–3.3)
LYMPHOCYTES # BLD AUTO: 5 % — LOW (ref 13–44)
MCHC RBC-ENTMCNC: 32.9 GM/DL — SIGNIFICANT CHANGE UP (ref 32–36)
MCHC RBC-ENTMCNC: 32.9 PG — SIGNIFICANT CHANGE UP (ref 27–34)
MCV RBC AUTO: 100 FL — SIGNIFICANT CHANGE UP (ref 80–100)
MONOCYTES # BLD AUTO: 1.19 K/UL — HIGH (ref 0–0.9)
MONOCYTES NFR BLD AUTO: 11.9 % — SIGNIFICANT CHANGE UP (ref 2–14)
NEUTROPHILS # BLD AUTO: 8.27 K/UL — HIGH (ref 1.8–7.4)
NEUTROPHILS NFR BLD AUTO: 82.5 % — HIGH (ref 43–77)
NT-PROBNP SERPL-SCNC: 765 PG/ML — HIGH (ref 0–300)
PCO2 BLDV: 43 MMHG — HIGH (ref 39–42)
PH BLDV: 7.41 — SIGNIFICANT CHANGE UP (ref 7.32–7.43)
PLATELET # BLD AUTO: 175 K/UL — SIGNIFICANT CHANGE UP (ref 150–400)
PO2 BLDV: 78 MMHG — HIGH (ref 25–45)
POTASSIUM BLDV-SCNC: 4.2 MMOL/L — SIGNIFICANT CHANGE UP (ref 3.5–5.1)
POTASSIUM SERPL-MCNC: 4.9 MMOL/L — SIGNIFICANT CHANGE UP (ref 3.5–5.3)
POTASSIUM SERPL-SCNC: 4.9 MMOL/L — SIGNIFICANT CHANGE UP (ref 3.5–5.3)
PROT SERPL-MCNC: 7.4 G/DL — SIGNIFICANT CHANGE UP (ref 6.6–8.7)
PROTHROM AB SERPL-ACNC: 11.7 SEC — SIGNIFICANT CHANGE UP (ref 9.5–13)
RAPID RVP RESULT: DETECTED
RBC # BLD: 4.44 M/UL — SIGNIFICANT CHANGE UP (ref 3.8–5.2)
RBC # FLD: 11.9 % — SIGNIFICANT CHANGE UP (ref 10.3–14.5)
RV+EV RNA SPEC QL NAA+PROBE: SIGNIFICANT CHANGE UP
SAO2 % BLDV: 98 % — SIGNIFICANT CHANGE UP
SARS-COV-2 RNA SPEC QL NAA+PROBE: SIGNIFICANT CHANGE UP
SODIUM SERPL-SCNC: 138 MMOL/L — SIGNIFICANT CHANGE UP (ref 135–145)
TROPONIN T, HIGH SENSITIVITY RESULT: 23 NG/L — SIGNIFICANT CHANGE UP (ref 0–51)
TROPONIN T, HIGH SENSITIVITY RESULT: 24 NG/L — SIGNIFICANT CHANGE UP (ref 0–51)
WBC # BLD: 10.02 K/UL — SIGNIFICANT CHANGE UP (ref 3.8–10.5)
WBC # FLD AUTO: 10.02 K/UL — SIGNIFICANT CHANGE UP (ref 3.8–10.5)

## 2024-02-15 PROCEDURE — 71045 X-RAY EXAM CHEST 1 VIEW: CPT | Mod: 26

## 2024-02-15 PROCEDURE — 99497 ADVNCD CARE PLAN 30 MIN: CPT | Mod: 25

## 2024-02-15 PROCEDURE — 99223 1ST HOSP IP/OBS HIGH 75: CPT

## 2024-02-15 PROCEDURE — 93010 ELECTROCARDIOGRAM REPORT: CPT

## 2024-02-15 PROCEDURE — 99291 CRITICAL CARE FIRST HOUR: CPT

## 2024-02-15 RX ORDER — CEFTRIAXONE 500 MG/1
1000 INJECTION, POWDER, FOR SOLUTION INTRAMUSCULAR; INTRAVENOUS ONCE
Refills: 0 | Status: DISCONTINUED | OUTPATIENT
Start: 2024-02-15 | End: 2024-02-15

## 2024-02-15 RX ORDER — LANOLIN ALCOHOL/MO/W.PET/CERES
3 CREAM (GRAM) TOPICAL AT BEDTIME
Refills: 0 | Status: DISCONTINUED | OUTPATIENT
Start: 2024-02-15 | End: 2024-02-19

## 2024-02-15 RX ORDER — LEVOTHYROXINE SODIUM 125 MCG
1 TABLET ORAL
Refills: 0 | DISCHARGE

## 2024-02-15 RX ORDER — SIMVASTATIN 20 MG/1
1 TABLET, FILM COATED ORAL
Refills: 0 | DISCHARGE

## 2024-02-15 RX ORDER — ONDANSETRON 8 MG/1
4 TABLET, FILM COATED ORAL EVERY 8 HOURS
Refills: 0 | Status: DISCONTINUED | OUTPATIENT
Start: 2024-02-15 | End: 2024-02-19

## 2024-02-15 RX ORDER — CEFTRIAXONE 500 MG/1
1000 INJECTION, POWDER, FOR SOLUTION INTRAMUSCULAR; INTRAVENOUS ONCE
Refills: 0 | Status: COMPLETED | OUTPATIENT
Start: 2024-02-15 | End: 2024-02-15

## 2024-02-15 RX ORDER — AZITHROMYCIN 500 MG/1
500 TABLET, FILM COATED ORAL ONCE
Refills: 0 | Status: COMPLETED | OUTPATIENT
Start: 2024-02-15 | End: 2024-02-15

## 2024-02-15 RX ORDER — LABETALOL HCL 100 MG
10 TABLET ORAL ONCE
Refills: 0 | Status: COMPLETED | OUTPATIENT
Start: 2024-02-15 | End: 2024-02-15

## 2024-02-15 RX ORDER — ACETAMINOPHEN 500 MG
1000 TABLET ORAL ONCE
Refills: 0 | Status: COMPLETED | OUTPATIENT
Start: 2024-02-15 | End: 2024-02-15

## 2024-02-15 RX ORDER — CHOLECALCIFEROL (VITAMIN D3) 125 MCG
50000 CAPSULE ORAL
Refills: 0 | Status: DISCONTINUED | OUTPATIENT
Start: 2024-02-15 | End: 2024-02-19

## 2024-02-15 RX ORDER — TIOTROPIUM BROMIDE 18 UG/1
1 CAPSULE ORAL; RESPIRATORY (INHALATION)
Refills: 0 | DISCHARGE

## 2024-02-15 RX ORDER — ACETAMINOPHEN 500 MG
650 TABLET ORAL EVERY 6 HOURS
Refills: 0 | Status: DISCONTINUED | OUTPATIENT
Start: 2024-02-15 | End: 2024-02-19

## 2024-02-15 RX ORDER — EZETIMIBE 10 MG/1
10 TABLET ORAL DAILY
Refills: 0 | Status: DISCONTINUED | OUTPATIENT
Start: 2024-02-15 | End: 2024-02-19

## 2024-02-15 RX ORDER — IPRATROPIUM/ALBUTEROL SULFATE 18-103MCG
3 AEROSOL WITH ADAPTER (GRAM) INHALATION
Refills: 0 | Status: COMPLETED | OUTPATIENT
Start: 2024-02-15 | End: 2024-02-15

## 2024-02-15 RX ORDER — SIMVASTATIN 20 MG/1
20 TABLET, FILM COATED ORAL AT BEDTIME
Refills: 0 | Status: DISCONTINUED | OUTPATIENT
Start: 2024-02-15 | End: 2024-02-19

## 2024-02-15 RX ORDER — IPRATROPIUM/ALBUTEROL SULFATE 18-103MCG
3 AEROSOL WITH ADAPTER (GRAM) INHALATION EVERY 6 HOURS
Refills: 0 | Status: DISCONTINUED | OUTPATIENT
Start: 2024-02-15 | End: 2024-02-17

## 2024-02-15 RX ORDER — LOSARTAN POTASSIUM 100 MG/1
50 TABLET, FILM COATED ORAL DAILY
Refills: 0 | Status: DISCONTINUED | OUTPATIENT
Start: 2024-02-15 | End: 2024-02-19

## 2024-02-15 RX ORDER — MAGNESIUM SULFATE 500 MG/ML
2 VIAL (ML) INJECTION ONCE
Refills: 0 | Status: COMPLETED | OUTPATIENT
Start: 2024-02-15 | End: 2024-02-15

## 2024-02-15 RX ORDER — LEVOTHYROXINE SODIUM 125 MCG
112 TABLET ORAL DAILY
Refills: 0 | Status: DISCONTINUED | OUTPATIENT
Start: 2024-02-15 | End: 2024-02-19

## 2024-02-15 RX ORDER — SODIUM CHLORIDE 9 MG/ML
2100 INJECTION INTRAMUSCULAR; INTRAVENOUS; SUBCUTANEOUS ONCE
Refills: 0 | Status: COMPLETED | OUTPATIENT
Start: 2024-02-15 | End: 2024-02-15

## 2024-02-15 RX ORDER — TIOTROPIUM BROMIDE 18 UG/1
2 CAPSULE ORAL; RESPIRATORY (INHALATION) DAILY
Refills: 0 | Status: DISCONTINUED | OUTPATIENT
Start: 2024-02-15 | End: 2024-02-19

## 2024-02-15 RX ORDER — EZETIMIBE 10 MG/1
1 TABLET ORAL
Refills: 0 | DISCHARGE

## 2024-02-15 RX ORDER — FLUTICASONE PROPIONATE AND SALMETEROL 50; 250 UG/1; UG/1
1 POWDER ORAL; RESPIRATORY (INHALATION)
Refills: 0 | DISCHARGE

## 2024-02-15 RX ORDER — CHOLECALCIFEROL (VITAMIN D3) 125 MCG
1 CAPSULE ORAL
Refills: 0 | DISCHARGE

## 2024-02-15 RX ORDER — ENOXAPARIN SODIUM 100 MG/ML
40 INJECTION SUBCUTANEOUS EVERY 24 HOURS
Refills: 0 | Status: DISCONTINUED | OUTPATIENT
Start: 2024-02-15 | End: 2024-02-19

## 2024-02-15 RX ORDER — BUDESONIDE AND FORMOTEROL FUMARATE DIHYDRATE 160; 4.5 UG/1; UG/1
2 AEROSOL RESPIRATORY (INHALATION)
Refills: 0 | Status: DISCONTINUED | OUTPATIENT
Start: 2024-02-15 | End: 2024-02-19

## 2024-02-15 RX ADMIN — Medication 1000 MILLIGRAM(S): at 21:06

## 2024-02-15 RX ADMIN — Medication 150 GRAM(S): at 19:44

## 2024-02-15 RX ADMIN — CEFTRIAXONE 1000 MILLIGRAM(S): 500 INJECTION, POWDER, FOR SOLUTION INTRAMUSCULAR; INTRAVENOUS at 19:43

## 2024-02-15 RX ADMIN — Medication 3 MILLILITER(S): at 16:54

## 2024-02-15 RX ADMIN — Medication 400 MILLIGRAM(S): at 16:54

## 2024-02-15 RX ADMIN — Medication 3 MILLILITER(S): at 16:55

## 2024-02-15 RX ADMIN — SIMVASTATIN 20 MILLIGRAM(S): 20 TABLET, FILM COATED ORAL at 22:29

## 2024-02-15 RX ADMIN — Medication 10 MILLIGRAM(S): at 20:57

## 2024-02-15 RX ADMIN — Medication 60 MILLIGRAM(S): at 19:43

## 2024-02-15 RX ADMIN — ENOXAPARIN SODIUM 40 MILLIGRAM(S): 100 INJECTION SUBCUTANEOUS at 20:56

## 2024-02-15 RX ADMIN — SODIUM CHLORIDE 2100 MILLILITER(S): 9 INJECTION INTRAMUSCULAR; INTRAVENOUS; SUBCUTANEOUS at 16:54

## 2024-02-15 RX ADMIN — AZITHROMYCIN 255 MILLIGRAM(S): 500 TABLET, FILM COATED ORAL at 16:54

## 2024-02-15 RX ADMIN — Medication 3 MILLILITER(S): at 16:56

## 2024-02-15 RX ADMIN — Medication 75 MILLIGRAM(S): at 20:56

## 2024-02-15 NOTE — ED ADULT TRIAGE NOTE - WEIGHT IN LBS
CC:  Harman Frias is here today for Office Visit and Physical       Medications have been reviewed and updated    Patient preferred phone number: [phone]  Prefers communication and results via [employer] Due   Topic Date Due   • Depression Screening  02/08/2022       Patient is up to date, no discussion needed.      Recent Review Flowsheet Data     Date 4/12/2022    Adult PHQ 2 Score 0    Adult PHQ 2 Interpretation No further screening needed    Little interest or pleasure in activity? Not at all    Feeling down, depressed or hopeless? Not at all           149.9

## 2024-02-15 NOTE — ED ADULT TRIAGE NOTE - HEART RATE (BEATS/MIN)
109 Quality 226: Preventive Care And Screening: Tobacco Use: Screening And Cessation Intervention: Patient screened for tobacco use and is an ex/non-smoker Detail Level: Generalized Quality 130: Documentation Of Current Medications In The Medical Record: Current Medications Documented Quality 431: Preventive Care And Screening: Unhealthy Alcohol Use - Screening: Patient not identified as an unhealthy alcohol user when screened for unhealthy alcohol use using a systematic screening method

## 2024-02-15 NOTE — ED ADULT TRIAGE NOTE - SPO2 (%)
[facility] Rachel Houston (daughter) #[phone]  Gerber Grover (son) #[phone]    Rachel Houston (daughter) #[phone]  Gerebr Grover (son) #[phone]    90

## 2024-02-15 NOTE — GOALS OF CARE CONVERSATION - ADVANCED CARE PLANNING - CONVERSATION DETAILS
Advance care directive discussed with patient and daughter at bed side. Patient named her daughter (Kassidy) as the health care proxy. CPR and intubation discussed, patient will like everything done, no restriction on medical management. Patient is FULL CODE.

## 2024-02-15 NOTE — ED ADULT NURSE NOTE - NSFALLUNIVINTERV_ED_ALL_ED
Bed/Stretcher in lowest position, wheels locked, appropriate side rails in place/Call bell, personal items and telephone in reach/Instruct patient to call for assistance before getting out of bed/chair/stretcher/Non-slip footwear applied when patient is off stretcher/[place] to call system/Physically safe environment - no spills, clutter or unnecessary equipment/Purposeful proactive rounding/Room/bathroom lighting operational, light cord in reach

## 2024-02-15 NOTE — H&P ADULT - NSHPPHYSICALEXAM_GEN_ALL_CORE
Vital Signs Last 24 Hrs  T(C): 38.3 (15 Feb 2024 16:15), Max: 38.3 (15 Feb 2024 16:15)  T(F): 100.9 (15 Feb 2024 16:15), Max: 100.9 (15 Feb 2024 16:15)  HR: 118 (15 Feb 2024 16:15) (109 - 118)  BP: 167/85 (15 Feb 2024 18:49) (167/85 - 214/89)  BP(mean): --  RR: 22 (15 Feb 2024 16:20) (20 - 22)  SpO2: 94% (15 Feb 2024 16:20) (88% - 94%)    Parameters below as of 15 Feb 2024 16:20  Patient On (Oxygen Delivery Method): nasal cannula  O2 Flow (L/min): 2

## 2024-02-15 NOTE — H&P ADULT - NSICDXPASTMEDICALHX_GEN_ALL_CORE_FT
PAST MEDICAL HISTORY:  History of COPD     HLD (hyperlipidemia)     HTN (hypertension)     Hypothyroidism

## 2024-02-15 NOTE — ED PROVIDER NOTE - OBJECTIVE STATEMENT
Pt is a 87yo female with PMH of COPD not on home oxygen presenting with SOB. Pt had SOB and cough for the past 2 days. Pt reports that her family at home have all been sick with URI symptoms. Pt reports feeling weak and feverish at home. Pt denies chest pain, N/V/D, abdominal pain, dysuria.

## 2024-02-15 NOTE — H&P ADULT - HISTORY OF PRESENT ILLNESS
87 y/o female with PMH of COPD not on home oxygen, HTN, HLD, hypothyroidism was sent to the ED from PCP office for evaluation of shortness of breath. Patient reported shortness of breath that started yesterday night, she noted chronic cough but worsened from baseline with associated weakness and chills. Patient and daughter said family at home have been sick with URI symptoms, daughter checked herself for covid was negative. She has no nausea, vomiting, chest pain, palpitation, abdominal pain, change in bowel/urinary habit, recent travel, calf pain, LE edema.

## 2024-02-15 NOTE — H&P ADULT - TIME BILLING
chart, labs and imaging reviewed. Physical examination, medication reconciliation and documentation. Discussion with patient, daughter and Er nurse.

## 2024-02-15 NOTE — ED PROVIDER NOTE - CLINICAL SUMMARY MEDICAL DECISION MAKING FREE TEXT BOX
Pt is a 87yo female with PMH of COPD not on home oxygen presenting with SOB. Pt had SOB and cough for the past 2 days. Pt reports that her family at home have all been sick with URI symptoms. Pt reports feeling weak and feverish at home. Pt denies chest pain, N/V/D, abdominal pain, dysuria. Pt came in tachy and with fever, sepsis workup ordered. CXR negative for pneumonia, no WBC. Likely URI triggering copd exacerbation. pt was given duoneb 3 rounds, magnesium sulfate, solumedrol with improvement but is still wheezing bilaterally and belly breathing on 3L NC. Pt to be admitted for COPD exacerbation requiring further treatment and oxygen supplementation. RVP pending.

## 2024-02-15 NOTE — ED ADULT TRIAGE NOTE - CHIEF COMPLAINT QUOTE
Pt brought in by daughter c/o cough and SOB for few days . Daughter states " entire house is sick with something " - denies fevers

## 2024-02-15 NOTE — H&P ADULT - ASSESSMENT
87 y/o female with PMH of COPD not on home oxygen, HTN, HLD, hypothyroidism was sent to the ED from PCP office for evaluation of shortness of breath. Patient reported shortness of breath that started yesterday night, she noted chronic cough but worsened from baseline with associated weakness and chills. Patient and daughter said family at home have been sick with URI symptoms, daughter checked herself for covid was negative. She has no nausea, vomiting, chest pain, palpitation, abdominal pain, change in bowel/urinary habit, recent travel, calf pain, LE edema.  89 y/o female with PMH of COPD not on home oxygen, HTN, HLD, hypothyroidism was sent to the ED from PCP office for evaluation of shortness of breath, patient with positive exposure to family with URI. In ED, found to have Flu A.       Acute responsive failure with hypoxia 2/2 flu A   Admit to telemetry   RVP positive for Flu A and AH1  Tamiflu started   Oxygen therapy as needed   Tylenol PRN for fever/pain     COPD exacerbation likely induced by Flu   Steroid given in the ED  Will continue Steroid 40mg tid, monitor glucose while on steroid        89 y/o female with PMH of COPD not on home oxygen, HTN, HLD, hypothyroidism was sent to the ED from PCP office for evaluation of shortness of breath, patient with positive exposure to family with URI. In ED, found to have Flu A.       Acute responsive failure with hypoxia 2/2 flu A   Admit to telemetry   RVP positive for Flu A and AH1  Tamiflu started   Oxygen therapy as needed   Tylenol PRN for fever/pain     COPD exacerbation likely induced by Flu   Steroid given in the ED  Will continue Steroid 40mg tid, monitor glucose while on steroid   Advair and Spiriva   Oxygen therapy as needed     HTN/HLD   Losartan 50mg   Simvastatin 20mg   Zetia 10mg     Hypothyroidism   Synthroid 112mcg     Supportive   DVT prophylaxis: Lovenox   Diet: DASH     Plan of care discussed with patient and daughter (Kassidy) at bed side, as well as ER nurse.

## 2024-02-16 LAB
ANION GAP SERPL CALC-SCNC: 11 MMOL/L — SIGNIFICANT CHANGE UP (ref 5–17)
BUN SERPL-MCNC: 19.3 MG/DL — SIGNIFICANT CHANGE UP (ref 8–20)
CALCIUM SERPL-MCNC: 9 MG/DL — SIGNIFICANT CHANGE UP (ref 8.4–10.5)
CHLORIDE SERPL-SCNC: 97 MMOL/L — SIGNIFICANT CHANGE UP (ref 96–108)
CO2 SERPL-SCNC: 27 MMOL/L — SIGNIFICANT CHANGE UP (ref 22–29)
CREAT SERPL-MCNC: 1 MG/DL — SIGNIFICANT CHANGE UP (ref 0.5–1.3)
EGFR: 54 ML/MIN/1.73M2 — LOW
GLUCOSE SERPL-MCNC: 126 MG/DL — HIGH (ref 70–99)
HCT VFR BLD CALC: 45.1 % — HIGH (ref 34.5–45)
HGB BLD-MCNC: 14.5 G/DL — SIGNIFICANT CHANGE UP (ref 11.5–15.5)
MCHC RBC-ENTMCNC: 32.2 GM/DL — SIGNIFICANT CHANGE UP (ref 32–36)
MCHC RBC-ENTMCNC: 32.9 PG — SIGNIFICANT CHANGE UP (ref 27–34)
MCV RBC AUTO: 102.3 FL — HIGH (ref 80–100)
PLATELET # BLD AUTO: 200 K/UL — SIGNIFICANT CHANGE UP (ref 150–400)
POTASSIUM SERPL-MCNC: 5 MMOL/L — SIGNIFICANT CHANGE UP (ref 3.5–5.3)
POTASSIUM SERPL-SCNC: 5 MMOL/L — SIGNIFICANT CHANGE UP (ref 3.5–5.3)
RBC # BLD: 4.41 M/UL — SIGNIFICANT CHANGE UP (ref 3.8–5.2)
RBC # FLD: 11.8 % — SIGNIFICANT CHANGE UP (ref 10.3–14.5)
SODIUM SERPL-SCNC: 135 MMOL/L — SIGNIFICANT CHANGE UP (ref 135–145)
WBC # BLD: 8.26 K/UL — SIGNIFICANT CHANGE UP (ref 3.8–10.5)
WBC # FLD AUTO: 8.26 K/UL — SIGNIFICANT CHANGE UP (ref 3.8–10.5)

## 2024-02-16 PROCEDURE — 99233 SBSQ HOSP IP/OBS HIGH 50: CPT

## 2024-02-16 RX ORDER — HYDRALAZINE HCL 50 MG
5 TABLET ORAL EVERY 4 HOURS
Refills: 0 | Status: DISCONTINUED | OUTPATIENT
Start: 2024-02-16 | End: 2024-02-17

## 2024-02-16 RX ORDER — METOPROLOL TARTRATE 50 MG
25 TABLET ORAL ONCE
Refills: 0 | Status: COMPLETED | OUTPATIENT
Start: 2024-02-16 | End: 2024-02-16

## 2024-02-16 RX ORDER — HYDRALAZINE HCL 50 MG
25 TABLET ORAL EVERY 8 HOURS
Refills: 0 | Status: DISCONTINUED | OUTPATIENT
Start: 2024-02-16 | End: 2024-02-19

## 2024-02-16 RX ADMIN — Medication 5 MILLIGRAM(S): at 18:29

## 2024-02-16 RX ADMIN — TIOTROPIUM BROMIDE 2 PUFF(S): 18 CAPSULE ORAL; RESPIRATORY (INHALATION) at 08:50

## 2024-02-16 RX ADMIN — ENOXAPARIN SODIUM 40 MILLIGRAM(S): 100 INJECTION SUBCUTANEOUS at 21:13

## 2024-02-16 RX ADMIN — Medication 25 MILLIGRAM(S): at 00:51

## 2024-02-16 RX ADMIN — Medication 112 MICROGRAM(S): at 05:27

## 2024-02-16 RX ADMIN — BUDESONIDE AND FORMOTEROL FUMARATE DIHYDRATE 2 PUFF(S): 160; 4.5 AEROSOL RESPIRATORY (INHALATION) at 21:31

## 2024-02-16 RX ADMIN — Medication 25 MILLIGRAM(S): at 13:55

## 2024-02-16 RX ADMIN — Medication 3 MILLILITER(S): at 21:30

## 2024-02-16 RX ADMIN — LOSARTAN POTASSIUM 50 MILLIGRAM(S): 100 TABLET, FILM COATED ORAL at 05:27

## 2024-02-16 RX ADMIN — Medication 25 MILLIGRAM(S): at 09:30

## 2024-02-16 RX ADMIN — BUDESONIDE AND FORMOTEROL FUMARATE DIHYDRATE 2 PUFF(S): 160; 4.5 AEROSOL RESPIRATORY (INHALATION) at 08:50

## 2024-02-16 RX ADMIN — Medication 5 MILLIGRAM(S): at 13:55

## 2024-02-16 RX ADMIN — SIMVASTATIN 20 MILLIGRAM(S): 20 TABLET, FILM COATED ORAL at 21:13

## 2024-02-16 RX ADMIN — Medication 40 MILLIGRAM(S): at 21:13

## 2024-02-16 RX ADMIN — Medication 75 MILLIGRAM(S): at 05:26

## 2024-02-16 RX ADMIN — Medication 25 MILLIGRAM(S): at 21:13

## 2024-02-16 RX ADMIN — Medication 40 MILLIGRAM(S): at 13:56

## 2024-02-16 RX ADMIN — Medication 75 MILLIGRAM(S): at 18:30

## 2024-02-16 RX ADMIN — EZETIMIBE 10 MILLIGRAM(S): 10 TABLET ORAL at 09:31

## 2024-02-16 RX ADMIN — Medication 40 MILLIGRAM(S): at 05:27

## 2024-02-16 NOTE — PROGRESS NOTE ADULT - ASSESSMENT
87 y/o female with PMH of COPD not on home oxygen, HTN, HLD, hypothyroidism was sent to the ED from PCP office for evaluation of shortness of breath, patient with positive exposure to family with URI. In ED, found to have Flu A,/RVP.      Acute responsive failure with hypoxia 2/2 flu A,COPD exacerbation  RVP positive for Flu A and AH1  Oxygen supplement  Tamiflu started   iv steroid tapering  bronchodilator  Tylenol PRN for fever/pain     Hypertensive urgency  sbp 214 at ED  still high  on Losartan 50mg  add hydralazine  monitor tele    HLP   Simvastatin 20mg   Zetia 10mg     Hypothyroidism   Synthroid 112mcg     Supportive   DVT prophylaxis: Lovenox   Diet: DASH              87 y/o female with PMH of COPD not on home oxygen, HTN, HLD, hypothyroidism was sent to the ED from PCP office for evaluation of shortness of breath, patient with positive exposure to family with URI. In ED, found to have Flu A,/RVP.      Acute responsive failure with hypoxia 2/2 flu A,COPD exacerbation  RVP positive for Flu A and AH1  Oxygen supplement  Tamiflu started   iv steroid tapering  bronchodilator  Tylenol PRN for fever/pain     Hypertensive urgency  sbp 214 at ED  still high. no symptoms  on Losartan 50mg  add hydralazine 25 mg q8hr  iv hydrlazine 5 mg q4hr prn [id number]  monitor tele    HLP   Simvastatin 20mg   Zetia 10mg     Hypothyroidism   Synthroid 112mcg     Supportive   DVT prophylaxis: Lovenox   Diet: DASH     plan of care dw pt

## 2024-02-16 NOTE — PROGRESS NOTE ADULT - SUBJECTIVE AND OBJECTIVE BOX
Patient is a 88y old  Female who presents with a chief complaint of     c/o cough,sob,fatigue  denies cp,fever,chill,sore throat,n/v/d  REVIEW OF SYSTEMS: All systems are reviewed and found to be negative except above    MEDICATIONS  (STANDING):  budesonide 160 MICROgram(s)/formoterol 4.5 MICROgram(s) Inhaler 2 Puff(s) Inhalation two times a day  cholecalciferol 02182 Unit(s) Oral <User Schedule>  enoxaparin Injectable 40 milliGRAM(s) SubCutaneous every 24 hours  ezetimibe 10 milliGRAM(s) Oral daily  hydrALAZINE 25 milliGRAM(s) Oral every 8 hours  levothyroxine 112 MICROGram(s) Oral daily  losartan 50 milliGRAM(s) Oral daily  methylPREDNISolone sodium succinate Injectable 40 milliGRAM(s) IV Push three times a day  oseltamivir 75 milliGRAM(s) Oral two times a day  simvastatin 20 milliGRAM(s) Oral at bedtime  tiotropium 2.5 MICROgram(s) Inhaler 2 Puff(s) Inhalation daily    MEDICATIONS  (PRN):  acetaminophen     Tablet .. 650 milliGRAM(s) Oral every 6 hours PRN Temp greater or equal to 38C (100.4F), Mild Pain (1 - 3)  albuterol/ipratropium for Nebulization 3 milliLiter(s) Nebulizer every 6 hours PRN Shortness of Breath and/or Wheezing  aluminum hydroxide/magnesium hydroxide/simethicone Suspension 30 milliLiter(s) Oral every 4 hours PRN Dyspepsia  melatonin 3 milliGRAM(s) Oral at bedtime PRN Insomnia  ondansetron Injectable 4 milliGRAM(s) IV Push every 8 hours PRN Nausea and/or Vomiting      CAPILLARY BLOOD GLUCOSE        I&O's Summary    16 Feb 2024 07:01  -  16 Feb 2024 13:06  --------------------------------------------------------  IN: 237 mL / OUT: 1 mL / NET: 236 mL        PHYSICAL EXAM:  Vital Signs Last 24 Hrs  T(C): 37.2 (16 Feb 2024 12:09), Max: 38.3 (15 Feb 2024 16:15)  T(F): 98.9 (16 Feb 2024 12:09), Max: 100.9 (15 Feb 2024 16:15)  HR: 80 (16 Feb 2024 12:09) (78 - 118)  BP: 179/92 (16 Feb 2024 12:09) (167/85 - 214/89)  BP(mean): --  RR: 18 (16 Feb 2024 12:09) (18 - 22)  SpO2: 100% (16 Feb 2024 12:09) (88% - 100%)    Parameters below as of 16 Feb 2024 12:09  Patient On (Oxygen Delivery Method): nasal cannula  O2 Flow (L/min): 2      CONSTITUTIONAL: NAD,  EYES: PERRLA; conjunctiva and sclera clear  ENMT: Moist oral mucosa,   RESPIRATORY: Normal respiratory effort; crackles  to auscultation bilaterally  CARDIOVASCULAR: Regular rate and rhythm, normal S1 and S2, no murmur   EXTS: No lower extremity edema; Peripheral pulses are 2+ bilaterally  ABDOMEN: Nontender to palpation, normoactive bowel sounds, no rebound/guarding;   MUSCLOSKELETAL:  no joint swelling or tenderness to palpation  PSYCH: affect appropriate  NEUROLOGY: A+O to person, place, and time; CN 2-12 are intact and symmetric; no gross sensory deficits;       LABS:                        14.5   8.26  )-----------( 200      ( 16 Feb 2024 06:48 )             45.1     02-16    135  |  97  |  19.3  ----------------------------<  126<H>  5.0   |  27.0  |  1.00    Ca    9.0      16 Feb 2024 06:48    TPro  7.4  /  Alb  3.8  /  TBili  0.6  /  DBili  x   /  AST  47<H>  /  ALT  20  /  AlkPhos  64  02-15    PT/INR - ( 15 Feb 2024 16:05 )   PT: 11.7 sec;   INR: 1.06 ratio         PTT - ( 15 Feb 2024 16:05 )  PTT:30.0 sec      Urinalysis Basic - ( 16 Feb 2024 06:48 )    Color: x / Appearance: x / SG: x / pH: x  Gluc: 126 mg/dL / Ketone: x  / Bili: x / Urobili: x   Blood: x / Protein: x / Nitrite: x   Leuk Esterase: x / RBC: x / WBC x   Sq Epi: x / Non Sq Epi: x / Bacteria: x          RADIOLOGY & ADDITIONAL TESTS:  Results Reviewed:

## 2024-02-17 LAB
ANION GAP SERPL CALC-SCNC: 12 MMOL/L — SIGNIFICANT CHANGE UP (ref 5–17)
ANION GAP SERPL CALC-SCNC: 14 MMOL/L — SIGNIFICANT CHANGE UP (ref 5–17)
BUN SERPL-MCNC: 34 MG/DL — HIGH (ref 8–20)
BUN SERPL-MCNC: 38.1 MG/DL — HIGH (ref 8–20)
CALCIUM SERPL-MCNC: 7.6 MG/DL — LOW (ref 8.4–10.5)
CALCIUM SERPL-MCNC: 8.6 MG/DL — SIGNIFICANT CHANGE UP (ref 8.4–10.5)
CHLORIDE SERPL-SCNC: 101 MMOL/L — SIGNIFICANT CHANGE UP (ref 96–108)
CHLORIDE SERPL-SCNC: 101 MMOL/L — SIGNIFICANT CHANGE UP (ref 96–108)
CO2 SERPL-SCNC: 19 MMOL/L — LOW (ref 22–29)
CO2 SERPL-SCNC: 25 MMOL/L — SIGNIFICANT CHANGE UP (ref 22–29)
CREAT SERPL-MCNC: 0.95 MG/DL — SIGNIFICANT CHANGE UP (ref 0.5–1.3)
CREAT SERPL-MCNC: 1.42 MG/DL — HIGH (ref 0.5–1.3)
EGFR: 36 ML/MIN/1.73M2 — LOW
EGFR: 58 ML/MIN/1.73M2 — LOW
FOLATE SERPL-MCNC: 4.2 NG/ML — LOW
GLUCOSE SERPL-MCNC: 110 MG/DL — HIGH (ref 70–99)
GLUCOSE SERPL-MCNC: 117 MG/DL — HIGH (ref 70–99)
HCT VFR BLD CALC: 37.4 % — SIGNIFICANT CHANGE UP (ref 34.5–45)
HCT VFR BLD CALC: 43 % — SIGNIFICANT CHANGE UP (ref 34.5–45)
HGB BLD-MCNC: 11.8 G/DL — SIGNIFICANT CHANGE UP (ref 11.5–15.5)
HGB BLD-MCNC: 13.6 G/DL — SIGNIFICANT CHANGE UP (ref 11.5–15.5)
MAGNESIUM SERPL-MCNC: 1.6 MG/DL — SIGNIFICANT CHANGE UP (ref 1.6–2.6)
MCHC RBC-ENTMCNC: 31.6 GM/DL — LOW (ref 32–36)
MCHC RBC-ENTMCNC: 31.6 GM/DL — LOW (ref 32–36)
MCHC RBC-ENTMCNC: 31.9 PG — SIGNIFICANT CHANGE UP (ref 27–34)
MCHC RBC-ENTMCNC: 32.6 PG — SIGNIFICANT CHANGE UP (ref 27–34)
MCV RBC AUTO: 100.9 FL — HIGH (ref 80–100)
MCV RBC AUTO: 103.3 FL — HIGH (ref 80–100)
PLATELET # BLD AUTO: 169 K/UL — SIGNIFICANT CHANGE UP (ref 150–400)
PLATELET # BLD AUTO: 202 K/UL — SIGNIFICANT CHANGE UP (ref 150–400)
POTASSIUM SERPL-MCNC: 4.4 MMOL/L — SIGNIFICANT CHANGE UP (ref 3.5–5.3)
POTASSIUM SERPL-MCNC: 4.7 MMOL/L — SIGNIFICANT CHANGE UP (ref 3.5–5.3)
POTASSIUM SERPL-SCNC: 4.4 MMOL/L — SIGNIFICANT CHANGE UP (ref 3.5–5.3)
POTASSIUM SERPL-SCNC: 4.7 MMOL/L — SIGNIFICANT CHANGE UP (ref 3.5–5.3)
RBC # BLD: 3.62 M/UL — LOW (ref 3.8–5.2)
RBC # BLD: 4.26 M/UL — SIGNIFICANT CHANGE UP (ref 3.8–5.2)
RBC # FLD: 12.1 % — SIGNIFICANT CHANGE UP (ref 10.3–14.5)
RBC # FLD: 12.2 % — SIGNIFICANT CHANGE UP (ref 10.3–14.5)
SODIUM SERPL-SCNC: 134 MMOL/L — LOW (ref 135–145)
SODIUM SERPL-SCNC: 138 MMOL/L — SIGNIFICANT CHANGE UP (ref 135–145)
T4 AB SER-ACNC: 4.4 UG/DL — LOW (ref 4.5–12)
TSH SERPL-MCNC: <0.1 UIU/ML — LOW (ref 0.27–4.2)
VIT B12 SERPL-MCNC: 467 PG/ML — SIGNIFICANT CHANGE UP (ref 232–1245)
WBC # BLD: 10.65 K/UL — HIGH (ref 3.8–10.5)
WBC # BLD: 12.31 K/UL — HIGH (ref 3.8–10.5)
WBC # FLD AUTO: 10.65 K/UL — HIGH (ref 3.8–10.5)
WBC # FLD AUTO: 12.31 K/UL — HIGH (ref 3.8–10.5)

## 2024-02-17 PROCEDURE — 99233 SBSQ HOSP IP/OBS HIGH 50: CPT

## 2024-02-17 RX ORDER — AZITHROMYCIN 500 MG/1
500 TABLET, FILM COATED ORAL ONCE
Refills: 0 | Status: COMPLETED | OUTPATIENT
Start: 2024-02-17 | End: 2024-02-17

## 2024-02-17 RX ORDER — AZITHROMYCIN 500 MG/1
500 TABLET, FILM COATED ORAL EVERY 24 HOURS
Refills: 0 | Status: DISCONTINUED | OUTPATIENT
Start: 2024-02-18 | End: 2024-02-19

## 2024-02-17 RX ORDER — AZITHROMYCIN 500 MG/1
TABLET, FILM COATED ORAL
Refills: 0 | Status: DISCONTINUED | OUTPATIENT
Start: 2024-02-17 | End: 2024-02-19

## 2024-02-17 RX ORDER — IPRATROPIUM/ALBUTEROL SULFATE 18-103MCG
3 AEROSOL WITH ADAPTER (GRAM) INHALATION EVERY 6 HOURS
Refills: 0 | Status: DISCONTINUED | OUTPATIENT
Start: 2024-02-17 | End: 2024-02-18

## 2024-02-17 RX ORDER — LABETALOL HCL 100 MG
10 TABLET ORAL ONCE
Refills: 0 | Status: COMPLETED | OUTPATIENT
Start: 2024-02-17 | End: 2024-02-17

## 2024-02-17 RX ORDER — FOLIC ACID 0.8 MG
1 TABLET ORAL DAILY
Refills: 0 | Status: DISCONTINUED | OUTPATIENT
Start: 2024-02-17 | End: 2024-02-19

## 2024-02-17 RX ORDER — SODIUM CHLORIDE 9 MG/ML
1000 INJECTION, SOLUTION INTRAVENOUS
Refills: 0 | Status: COMPLETED | OUTPATIENT
Start: 2024-02-17 | End: 2024-02-17

## 2024-02-17 RX ADMIN — Medication 3 MILLILITER(S): at 21:17

## 2024-02-17 RX ADMIN — LOSARTAN POTASSIUM 50 MILLIGRAM(S): 100 TABLET, FILM COATED ORAL at 05:23

## 2024-02-17 RX ADMIN — Medication 25 MILLIGRAM(S): at 21:18

## 2024-02-17 RX ADMIN — SODIUM CHLORIDE 83.3 MILLILITER(S): 9 INJECTION, SOLUTION INTRAVENOUS at 09:03

## 2024-02-17 RX ADMIN — Medication 112 MICROGRAM(S): at 05:23

## 2024-02-17 RX ADMIN — BUDESONIDE AND FORMOTEROL FUMARATE DIHYDRATE 2 PUFF(S): 160; 4.5 AEROSOL RESPIRATORY (INHALATION) at 21:19

## 2024-02-17 RX ADMIN — Medication 3 MILLILITER(S): at 19:04

## 2024-02-17 RX ADMIN — Medication 75 MILLIGRAM(S): at 05:23

## 2024-02-17 RX ADMIN — Medication 75 MILLIGRAM(S): at 17:05

## 2024-02-17 RX ADMIN — EZETIMIBE 10 MILLIGRAM(S): 10 TABLET ORAL at 12:28

## 2024-02-17 RX ADMIN — ENOXAPARIN SODIUM 40 MILLIGRAM(S): 100 INJECTION SUBCUTANEOUS at 21:19

## 2024-02-17 RX ADMIN — Medication 40 MILLIGRAM(S): at 17:05

## 2024-02-17 RX ADMIN — Medication 40 MILLIGRAM(S): at 05:23

## 2024-02-17 RX ADMIN — SIMVASTATIN 20 MILLIGRAM(S): 20 TABLET, FILM COATED ORAL at 21:18

## 2024-02-17 RX ADMIN — AZITHROMYCIN 255 MILLIGRAM(S): 500 TABLET, FILM COATED ORAL at 09:03

## 2024-02-17 RX ADMIN — SODIUM CHLORIDE 83.3 MILLILITER(S): 9 INJECTION, SOLUTION INTRAVENOUS at 19:25

## 2024-02-17 RX ADMIN — Medication 25 MILLIGRAM(S): at 12:29

## 2024-02-17 RX ADMIN — Medication 25 MILLIGRAM(S): at 05:23

## 2024-02-17 RX ADMIN — Medication 10 MILLIGRAM(S): at 18:06

## 2024-02-17 RX ADMIN — Medication 3 MILLILITER(S): at 09:01

## 2024-02-17 NOTE — PROGRESS NOTE ADULT - ASSESSMENT
88y Female w/ PMH of COPD (no home O2), HTN, HLD, and hypothyroidism admitted for acute hypoxic respiratory failure 2/2 AECOPD in setting of influenza infection. Now, pt continues on anti-inflammatories and bronchodilators w/ improvement in hypoxia.    Plan  #Acute hypoxic respiratory failure, 2/2 AECOPD  #AECOPD, 2/2 influenza A  #Influenza A  -Pt initially hypoxic to 88% w/o evidence of hypercapnia  -No leukocytosis or fever in ED  -CXR without concern for superimposed bacterial process  -Continue steroids w/ Solumedrol 40mg IV TID, will taper to BID tomorrow  -Continue Duoneb, Symbicort, and Spiriva  -Added azithromycin for anti-inflammatory effects  -PRN supplemental O2, currently on 2L but with adequate SpO2 trends, will titrate as possible  -Supportive therapy w/ Robitussin and Tessalon  -PRN supplemental O2, pt is currently requiring  -Incentive spirometry  -Encouraged ambulation and deep breathing exercises  -Monitor CBC and trend fever curve, expect degree of leukocytosis w/ initiation of steroids    #Severe asymptomatic hypertension/hypertensive urgency, resolved  #HTN, stable  -Pt had SBP in 210s in ED  -Home Losartan restarted and hydralazine was added  -BP may be elevated 2/2 steroids, so will consider discontinuing hydralazine if readings consistently below 160s/90s  -Monitor vitals    #HLD  -Continue home statin  -Continue home Zetia    #Hypothyroidism  -Continue home Synthroid    DVT PPx: Lovenox  Dispo:  88y Female w/ PMH of COPD (no home O2), HTN, HLD, and hypothyroidism admitted for acute hypoxic respiratory failure 2/2 AECOPD in setting of influenza infection. Now, pt continues on anti-inflammatories and bronchodilators w/ improvement in hypoxia.    Plan  #Acute hypoxic respiratory failure, 2/2 AECOPD  #AECOPD, 2/2 influenza A  #Influenza A  -Pt initially hypoxic to 88% w/o evidence of hypercapnia  -No leukocytosis or fever in ED  -CXR without concern for superimposed bacterial process  -Continue steroids w/ Solumedrol 40mg IV TID, will taper to BID tomorrow  -Continue Duoneb, Symbicort, and Spiriva  -Added azithromycin for anti-inflammatory effects  -PRN supplemental O2, currently on 2L but with adequate SpO2 trends, will titrate as possible  -Supportive therapy w/ Robitussin and Tessalon  -PRN supplemental O2, pt is currently requiring  -Incentive spirometry  -Encouraged ambulation and deep breathing exercises  -Monitor CBC and trend fever curve, expect degree of leukocytosis w/ initiation of steroids    #Severe asymptomatic hypertension/hypertensive urgency, resolved  #HTN, stable  -Pt had SBP in 210s in ED  -Home Losartan restarted and hydralazine was added  -BP may be elevated 2/2 steroids, so will consider discontinuing hydralazine if readings consistently below 160s/90s  -Monitor vitals    #JOHNATHON, pre-renal  -Pt developed pre-renal azotemia   -Will provide IV hydration w/ LR at 83.3 cc/hr over 12 hours (1L total)  -Monitor and trend BMP  -Avoid nephrotoxic agents as possible  -Renally dose medications as applicable    #HLD  -Continue home statin  -Continue home Zetia    #Hypothyroidism  -Continue home Synthroid    DVT PPx: Lovenox  Dispo:  88y Female w/ PMH of COPD (no home O2), HTN, HLD, and hypothyroidism admitted for acute hypoxic respiratory failure 2/2 AECOPD in setting of influenza infection. Now, pt continues on anti-inflammatories and bronchodilators w/ improvement in hypoxia.    Plan  #Acute hypoxic respiratory failure, 2/2 AECOPD  #AECOPD, 2/2 influenza A  #Influenza A  -Pt initially hypoxic to 88% w/o evidence of hypercapnia  -No leukocytosis or fever in ED  -CXR without concern for superimposed bacterial process  -Continue steroids, will taper Solumedrol 40mg IV TID to BID today  -Continue Duoneb, Symbicort, and Spiriva  -Added azithromycin for anti-inflammatory effects  -PRN supplemental O2, currently on 1L but with adequate SpO2 trends, will titrate as to ambient air  -Supportive therapy w/ Robitussin and Tessalon  -PRN supplemental O2, pt is currently requiring  -Incentive spirometry  -Encouraged ambulation and deep breathing exercises  -Monitor CBC and trend fever curve, expect degree of leukocytosis w/ initiation of steroids    #Severe asymptomatic hypertension/hypertensive urgency, resolved  #HTN, stable  -Pt had SBP in 210s in ED  -Home Losartan restarted and hydralazine was added  -BP may be elevated 2/2 steroids, so will consider discontinuing hydralazine if readings consistently below 160s/90s  -Monitor vitals    #JOHNATHON, pre-renal  -Pt developed pre-renal azotemia   -Will provide IV hydration w/ LR at 83.3 cc/hr over 12 hours (1L total)  -Monitor and trend BMP  -Avoid nephrotoxic agents as possible  -Renally dose medications as applicable    #HLD  -Continue home statin  -Continue home Zetia    #Hypothyroidism  -Continue home Synthroid    DVT PPx: Lovenox  Dispo: Discharge likely tomorrow  88y Female w/ PMH of COPD (no home O2), HTN, HLD, and hypothyroidism admitted for acute hypoxic respiratory failure 2/2 AECOPD in setting of influenza infection. Now, pt continues on anti-inflammatories and bronchodilators w/ improvement in hypoxia.    Plan  #Acute hypoxic respiratory failure, 2/2 AECOPD  #AECOPD, 2/2 influenza A  #Influenza A  -Pt initially hypoxic to 88% w/o evidence of hypercapnia  -No leukocytosis or fever in ED  -CXR without concern for superimposed bacterial process  -Continue steroids, will taper Solumedrol 40mg IV TID to BID today  -Continue Duoneb, Symbicort, and Spiriva  -Added azithromycin for anti-inflammatory effects  -PRN supplemental O2, currently on 1L but with adequate SpO2 trends, will titrate as to ambient air  -Supportive therapy w/ Robitussin and Tessalon  -PRN supplemental O2, pt is currently requiring  -Incentive spirometry  -Encouraged ambulation and deep breathing exercises  -Monitor CBC and trend fever curve, expect degree of leukocytosis w/ initiation of steroids  -O2 is being weaned off     #Severe asymptomatic hypertension/hypertensive urgency, resolved  #HTN, stable  -Pt had SBP in 210s in ED  -Home Losartan restarted and hydralazine was added  -BP may be elevated 2/2 steroids, so will consider discontinuing hydralazine if readings consistently below 160s/90s  -Monitor vitals    #JOHNATHON, pre-renal  -Pt developed pre-renal azotemia   -Will provide IV hydration w/ LR at 83.3 cc/hr over 12 hours (1L total)  -Monitor and trend BMP  -Avoid nephrotoxic agents as possible  -Renally dose medications as applicable    #HLD  -Continue home statin  -Continue home Zetia    #Hypothyroidism  -Continue home Synthroid    DVT PPx: Lovenox  Dispo: Discharge likely tomorrow if creatinine is coming down

## 2024-02-17 NOTE — PROGRESS NOTE ADULT - SUBJECTIVE AND OBJECTIVE BOX
SUBJECTIVE  BRIEF HOSPITAL COURSE SUMMARY: Pt is a 88yFemale with a PMH of COPD (no home O2), HTN, HLD, and hypothyroidism who presented with SOB and cough. In the ED, pt was hypoxic to 88% on ambient air with increased work of breathing, so was placed on NC. Found to be influenza A positive, but CXR w/o concern for superimposed bacterial process, so pt was admitted for AECOPD 2/2 influenza A and started on steroids, bronchodilators, and Tamiflu.     LAST 24 HOURS: No acute events.   TODAY: Pt seen at bedside this AM. Pt states -. Offers no other acute complaints & ROS otherwise negative.     OBJECTIVE  PHYSICAL EXAM:    Vital Signs Last 24 Hrs  T(C): 36.7 (17 Feb 2024 04:42), Max: 37.2 (16 Feb 2024 12:09)  T(F): 98.1 (17 Feb 2024 04:42), Max: 98.9 (16 Feb 2024 12:09)  HR: 81 (17 Feb 2024 04:42) (78 - 112)  BP: 138/73 (17 Feb 2024 04:42) (138/73 - 179/92)  BP(mean): --  RR: 18 (17 Feb 2024 04:42) (18 - 19)  SpO2: 97% (17 Feb 2024 04:42) (97% - 100%)    Parameters below as of 17 Feb 2024 04:42  Patient On (Oxygen Delivery Method): nasal cannula  O2 Flow (L/min): 2      MEDICATIONS  (STANDING):  azithromycin  IVPB      budesonide 160 MICROgram(s)/formoterol 4.5 MICROgram(s) Inhaler 2 Puff(s) Inhalation two times a day  cholecalciferol 55764 Unit(s) Oral <User Schedule>  enoxaparin Injectable 40 milliGRAM(s) SubCutaneous every 24 hours  ezetimibe 10 milliGRAM(s) Oral daily  hydrALAZINE 25 milliGRAM(s) Oral every 8 hours  levothyroxine 112 MICROGram(s) Oral daily  losartan 50 milliGRAM(s) Oral daily  methylPREDNISolone sodium succinate Injectable 40 milliGRAM(s) IV Push three times a day  oseltamivir 75 milliGRAM(s) Oral two times a day  simvastatin 20 milliGRAM(s) Oral at bedtime  tiotropium 2.5 MICROgram(s) Inhaler 2 Puff(s) Inhalation daily    MEDICATIONS  (PRN):  acetaminophen     Tablet .. 650 milliGRAM(s) Oral every 6 hours PRN Temp greater or equal to 38C (100.4F), Mild Pain (1 - 3)  albuterol/ipratropium for Nebulization 3 milliLiter(s) Nebulizer every 6 hours PRN Shortness of Breath and/or Wheezing  aluminum hydroxide/magnesium hydroxide/simethicone Suspension 30 milliLiter(s) Oral every 4 hours PRN Dyspepsia  melatonin 3 milliGRAM(s) Oral at bedtime PRN Insomnia  ondansetron Injectable 4 milliGRAM(s) IV Push every 8 hours PRN Nausea and/or Vomiting    Allergies    Allergy Status Unknown    Intolerances        LABS:                        13.6   12.31 )-----------( 202      ( 17 Feb 2024 06:21 )             43.0     02-16    135  |  97  |  19.3  ----------------------------<  126<H>  5.0   |  27.0  |  1.00    Ca    9.0      16 Feb 2024 06:48    TPro  7.4  /  Alb  3.8  /  TBili  0.6  /  DBili  x   /  AST  47<H>  /  ALT  20  /  AlkPhos  64  02-15    PT/INR - ( 15 Feb 2024 16:05 )   PT: 11.7 sec;   INR: 1.06 ratio         PTT - ( 15 Feb 2024 16:05 )  PTT:30.0 sec  Urinalysis Basic - ( 16 Feb 2024 06:48 )    Color: x / Appearance: x / SG: x / pH: x  Gluc: 126 mg/dL / Ketone: x  / Bili: x / Urobili: x   Blood: x / Protein: x / Nitrite: x   Leuk Esterase: x / RBC: x / WBC x   Sq Epi: x / Non Sq Epi: x / Bacteria: x      CAPILLARY BLOOD GLUCOSE          RADIOLOGY & ADDITIONAL TESTS:      Imaging Personally Reviewed:  [  ] YES  [  ] NO    Consultant(s) Notes Reviewed:  [  ] YES  [  ] NO    Care Discussed with Consultants/Other Providers [  ] YES  [  ] NO    Plan of Care discussed with Housestaff [ X ]YES [  ] NO SUBJECTIVE  BRIEF HOSPITAL COURSE SUMMARY: Pt is a 88yFemale with a PMH of COPD (no home O2), HTN, HLD, and hypothyroidism who presented with SOB and cough. In the ED, pt was hypoxic to 88% on ambient air with increased work of breathing, so was placed on NC. Found to be influenza A positive, but CXR w/o concern for superimposed bacterial process, so pt was admitted for AECOPD 2/2 influenza A and started on steroids, bronchodilators, and Tamiflu.     LAST 24 HOURS: No acute events.   TODAY: Pt seen at bedside this AM. Pt states she is feeling better and breathing well. Offers no other acute complaints & ROS otherwise negative.     OBJECTIVE  PHYSICAL EXAM:  GENERAL: no acute distress, comfortably in bed  HEENT: Atraumatic, normocephalic, non-icteric, no JVD  NEURO:  A&Ox3, no focal deficits, moving all extremities spontaneously, no dysarthria, CN II-XII grossly intact  PSYCH: Normal affect, calm, appropriate insight and judgment, fluent speech  LUNGS: Minimal right-sided wheezing, no wrr, non-labored breathing  HEART: RRR, no murmur appreciated  ABD: Soft, non-tender, non-distended, no organomegaly, no appreciable masses, +bs all 4 quadrants  EXTREMITIES: Nontender, no clubbing, cyanosis, or edema  SKIN: No rashes or lesions     Vital Signs Last 24 Hrs  T(C): 36.7 (17 Feb 2024 04:42), Max: 37.2 (16 Feb 2024 12:09)  T(F): 98.1 (17 Feb 2024 04:42), Max: 98.9 (16 Feb 2024 12:09)  HR: 81 (17 Feb 2024 04:42) (78 - 112)  BP: 138/73 (17 Feb 2024 04:42) (138/73 - 179/92)  BP(mean): --  RR: 18 (17 Feb 2024 04:42) (18 - 19)  SpO2: 97% (17 Feb 2024 04:42) (97% - 100%)    Parameters below as of 17 Feb 2024 04:42  Patient On (Oxygen Delivery Method): nasal cannula  O2 Flow (L/min): 2      MEDICATIONS  (STANDING):  azithromycin  IVPB      budesonide 160 MICROgram(s)/formoterol 4.5 MICROgram(s) Inhaler 2 Puff(s) Inhalation two times a day  cholecalciferol 38984 Unit(s) Oral <User Schedule>  enoxaparin Injectable 40 milliGRAM(s) SubCutaneous every 24 hours  ezetimibe 10 milliGRAM(s) Oral daily  hydrALAZINE 25 milliGRAM(s) Oral every 8 hours  levothyroxine 112 MICROGram(s) Oral daily  losartan 50 milliGRAM(s) Oral daily  methylPREDNISolone sodium succinate Injectable 40 milliGRAM(s) IV Push three times a day  oseltamivir 75 milliGRAM(s) Oral two times a day  simvastatin 20 milliGRAM(s) Oral at bedtime  tiotropium 2.5 MICROgram(s) Inhaler 2 Puff(s) Inhalation daily    MEDICATIONS  (PRN):  acetaminophen     Tablet .. 650 milliGRAM(s) Oral every 6 hours PRN Temp greater or equal to 38C (100.4F), Mild Pain (1 - 3)  albuterol/ipratropium for Nebulization 3 milliLiter(s) Nebulizer every 6 hours PRN Shortness of Breath and/or Wheezing  aluminum hydroxide/magnesium hydroxide/simethicone Suspension 30 milliLiter(s) Oral every 4 hours PRN Dyspepsia  melatonin 3 milliGRAM(s) Oral at bedtime PRN Insomnia  ondansetron Injectable 4 milliGRAM(s) IV Push every 8 hours PRN Nausea and/or Vomiting    Allergies    Allergy Status Unknown    Intolerances        LABS:                        13.6   12.31 )-----------( 202      ( 17 Feb 2024 06:21 )             43.0     02-16    135  |  97  |  19.3  ----------------------------<  126<H>  5.0   |  27.0  |  1.00    Ca    9.0      16 Feb 2024 06:48    TPro  7.4  /  Alb  3.8  /  TBili  0.6  /  DBili  x   /  AST  47<H>  /  ALT  20  /  AlkPhos  64  02-15    PT/INR - ( 15 Feb 2024 16:05 )   PT: 11.7 sec;   INR: 1.06 ratio         PTT - ( 15 Feb 2024 16:05 )  PTT:30.0 sec  Urinalysis Basic - ( 16 Feb 2024 06:48 )    Color: x / Appearance: x / SG: x / pH: x  Gluc: 126 mg/dL / Ketone: x  / Bili: x / Urobili: x   Blood: x / Protein: x / Nitrite: x   Leuk Esterase: x / RBC: x / WBC x   Sq Epi: x / Non Sq Epi: x / Bacteria: x      CAPILLARY BLOOD GLUCOSE          RADIOLOGY & ADDITIONAL TESTS:      Imaging Personally Reviewed:  [  ] YES  [  ] NO    Consultant(s) Notes Reviewed:  [  ] YES  [  ] NO    Care Discussed with Consultants/Other Providers [  ] YES  [  ] NO    Plan of Care discussed with Housestaff [ X ]YES [  ] NO SUBJECTIVE  BRIEF HOSPITAL COURSE SUMMARY: Pt is a 88yFemale with a PMH of COPD (no home O2), HTN, HLD, and hypothyroidism who presented with SOB and cough. In the ED, pt was hypoxic to 88% on ambient air with increased work of breathing, so was placed on NC. Found to be influenza A positive, but CXR w/o concern for superimposed bacterial process, so pt was admitted for AECOPD 2/2 influenza A and started on steroids, bronchodilators, and Tamiflu.     LAST 24 HOURS: No acute events.   TODAY: Pt seen at bedside this AM. Pt states she is feeling better and breathing well. Offers no other acute complaints & ROS otherwise negative.     OBJECTIVE  PHYSICAL EXAM:  GENERAL: no acute distress, comfortably in bed  HEENT: Atraumatic, normocephalic, non-icteric, no JVD  NEURO:  A&Ox3    LUNGS: Minimal right-sided wheezing, no wrr  HEART: RRR, no murmur appreciated  ABD: Soft, non-tender, +bs all 4 quadrants  EXTREMITIES: Nontender, no edema  SKIN: No rashes or lesions     Vital Signs Last 24 Hrs  T(C): 36.7 (17 Feb 2024 04:42), Max: 37.2 (16 Feb 2024 12:09)  T(F): 98.1 (17 Feb 2024 04:42), Max: 98.9 (16 Feb 2024 12:09)  HR: 81 (17 Feb 2024 04:42) (78 - 112)  BP: 138/73 (17 Feb 2024 04:42) (138/73 - 179/92)  RR: 18 (17 Feb 2024 04:42) (18 - 19)  SpO2: 97% (17 Feb 2024 04:42) (97% - 100%)    Parameters below as of 17 Feb 2024 04:42  Patient On (Oxygen Delivery Method): nasal cannula  O2 Flow (L/min): 2      MEDICATIONS  (STANDING):  azithromycin  IVPB      budesonide 160 MICROgram(s)/formoterol 4.5 MICROgram(s) Inhaler 2 Puff(s) Inhalation two times a day  cholecalciferol 39977 Unit(s) Oral <User Schedule>  enoxaparin Injectable 40 milliGRAM(s) SubCutaneous every 24 hours  ezetimibe 10 milliGRAM(s) Oral daily  hydrALAZINE 25 milliGRAM(s) Oral every 8 hours  levothyroxine 112 MICROGram(s) Oral daily  losartan 50 milliGRAM(s) Oral daily  methylPREDNISolone sodium succinate Injectable 40 milliGRAM(s) IV Push three times a day  oseltamivir 75 milliGRAM(s) Oral two times a day  simvastatin 20 milliGRAM(s) Oral at bedtime  tiotropium 2.5 MICROgram(s) Inhaler 2 Puff(s) Inhalation daily    MEDICATIONS  (PRN):  acetaminophen     Tablet .. 650 milliGRAM(s) Oral every 6 hours PRN Temp greater or equal to 38C (100.4F), Mild Pain (1 - 3)  albuterol/ipratropium for Nebulization 3 milliLiter(s) Nebulizer every 6 hours PRN Shortness of Breath and/or Wheezing  aluminum hydroxide/magnesium hydroxide/simethicone Suspension 30 milliLiter(s) Oral every 4 hours PRN Dyspepsia  melatonin 3 milliGRAM(s) Oral at bedtime PRN Insomnia  ondansetron Injectable 4 milliGRAM(s) IV Push every 8 hours PRN Nausea and/or Vomiting    Allergies    Allergy Status Unknown    Intolerances        LABS:                        13.6   12.31 )-----------( 202      ( 17 Feb 2024 06:21 )             43.0     02-16    135  |  97  |  19.3  ----------------------------<  126<H>  5.0   |  27.0  |  1.00    Ca    9.0      16 Feb 2024 06:48    TPro  7.4  /  Alb  3.8  /  TBili  0.6  /  DBili  x   /  AST  47<H>  /  ALT  20  /  AlkPhos  64  02-15    PT/INR - ( 15 Feb 2024 16:05 )   PT: 11.7 sec;   INR: 1.06 ratio         PTT - ( 15 Feb 2024 16:05 )  PTT:30.0 sec  Urinalysis Basic - ( 16 Feb 2024 06:48 )    Color: x / Appearance: x / SG: x / pH: x  Gluc: 126 mg/dL / Ketone: x  / Bili: x / Urobili: x   Blood: x / Protein: x / Nitrite: x   Leuk Esterase: x / RBC: x / WBC x   Sq Epi: x / Non Sq Epi: x / Bacteria: x      CAPILLARY BLOOD GLUCOSE          RADIOLOGY & ADDITIONAL TESTS:      Imaging Personally Reviewed:  [  ] YES  [  ] NO    Consultant(s) Notes Reviewed:  [  ] YES  [  ] NO    Care Discussed with Consultants/Other Providers [  ] YES  [  ] NO    Plan of Care discussed with Housestaff [ X ]YES [  ] NO

## 2024-02-17 NOTE — PROGRESS NOTE ADULT - ATTENDING COMMENTS
Patient is see and evaluated, chart reviewed in detail, agree with assessment and plan of the Resident  patient has no complains, denies sob   decrease air entry b/l   patient creatinine is up  will give some fluids  will monitor BMP   if creatinine is improving will d/c

## 2024-02-18 LAB
ANION GAP SERPL CALC-SCNC: 13 MMOL/L — SIGNIFICANT CHANGE UP (ref 5–17)
BUN SERPL-MCNC: 39.4 MG/DL — HIGH (ref 8–20)
CALCIUM SERPL-MCNC: 8.9 MG/DL — SIGNIFICANT CHANGE UP (ref 8.4–10.5)
CHLORIDE SERPL-SCNC: 102 MMOL/L — SIGNIFICANT CHANGE UP (ref 96–108)
CO2 SERPL-SCNC: 24 MMOL/L — SIGNIFICANT CHANGE UP (ref 22–29)
CREAT SERPL-MCNC: 1.28 MG/DL — SIGNIFICANT CHANGE UP (ref 0.5–1.3)
EGFR: 40 ML/MIN/1.73M2 — LOW
GLUCOSE SERPL-MCNC: 116 MG/DL — HIGH (ref 70–99)
HCT VFR BLD CALC: 44.1 % — SIGNIFICANT CHANGE UP (ref 34.5–45)
HGB BLD-MCNC: 14.2 G/DL — SIGNIFICANT CHANGE UP (ref 11.5–15.5)
MAGNESIUM SERPL-MCNC: 2.8 MG/DL — HIGH (ref 1.6–2.6)
MCHC RBC-ENTMCNC: 32.2 GM/DL — SIGNIFICANT CHANGE UP (ref 32–36)
MCHC RBC-ENTMCNC: 32.3 PG — SIGNIFICANT CHANGE UP (ref 27–34)
MCV RBC AUTO: 100.5 FL — HIGH (ref 80–100)
PHOSPHATE SERPL-MCNC: 3.7 MG/DL — SIGNIFICANT CHANGE UP (ref 2.4–4.7)
PLATELET # BLD AUTO: 213 K/UL — SIGNIFICANT CHANGE UP (ref 150–400)
POTASSIUM SERPL-MCNC: 4.6 MMOL/L — SIGNIFICANT CHANGE UP (ref 3.5–5.3)
POTASSIUM SERPL-SCNC: 4.6 MMOL/L — SIGNIFICANT CHANGE UP (ref 3.5–5.3)
RBC # BLD: 4.39 M/UL — SIGNIFICANT CHANGE UP (ref 3.8–5.2)
RBC # FLD: 12.3 % — SIGNIFICANT CHANGE UP (ref 10.3–14.5)
SODIUM SERPL-SCNC: 139 MMOL/L — SIGNIFICANT CHANGE UP (ref 135–145)
WBC # BLD: 13.05 K/UL — HIGH (ref 3.8–10.5)
WBC # FLD AUTO: 13.05 K/UL — HIGH (ref 3.8–10.5)

## 2024-02-18 PROCEDURE — 99232 SBSQ HOSP IP/OBS MODERATE 35: CPT

## 2024-02-18 RX ORDER — MAGNESIUM SULFATE 500 MG/ML
1 VIAL (ML) INJECTION ONCE
Refills: 0 | Status: COMPLETED | OUTPATIENT
Start: 2024-02-18 | End: 2024-02-18

## 2024-02-18 RX ORDER — LEVALBUTEROL 1.25 MG/.5ML
0.63 SOLUTION, CONCENTRATE RESPIRATORY (INHALATION) EVERY 6 HOURS
Refills: 0 | Status: DISCONTINUED | OUTPATIENT
Start: 2024-02-18 | End: 2024-02-19

## 2024-02-18 RX ADMIN — EZETIMIBE 10 MILLIGRAM(S): 10 TABLET ORAL at 11:32

## 2024-02-18 RX ADMIN — Medication 112 MICROGRAM(S): at 05:30

## 2024-02-18 RX ADMIN — Medication 3 MILLILITER(S): at 01:05

## 2024-02-18 RX ADMIN — Medication 1 MILLIGRAM(S): at 11:32

## 2024-02-18 RX ADMIN — Medication 75 MILLIGRAM(S): at 05:30

## 2024-02-18 RX ADMIN — Medication 75 MILLIGRAM(S): at 17:24

## 2024-02-18 RX ADMIN — Medication 40 MILLIGRAM(S): at 05:29

## 2024-02-18 RX ADMIN — ENOXAPARIN SODIUM 40 MILLIGRAM(S): 100 INJECTION SUBCUTANEOUS at 22:19

## 2024-02-18 RX ADMIN — Medication 100 GRAM(S): at 02:44

## 2024-02-18 RX ADMIN — LEVALBUTEROL 0.63 MILLIGRAM(S): 1.25 SOLUTION, CONCENTRATE RESPIRATORY (INHALATION) at 15:12

## 2024-02-18 RX ADMIN — BUDESONIDE AND FORMOTEROL FUMARATE DIHYDRATE 2 PUFF(S): 160; 4.5 AEROSOL RESPIRATORY (INHALATION) at 21:11

## 2024-02-18 RX ADMIN — BUDESONIDE AND FORMOTEROL FUMARATE DIHYDRATE 2 PUFF(S): 160; 4.5 AEROSOL RESPIRATORY (INHALATION) at 08:35

## 2024-02-18 RX ADMIN — TIOTROPIUM BROMIDE 2 PUFF(S): 18 CAPSULE ORAL; RESPIRATORY (INHALATION) at 08:35

## 2024-02-18 RX ADMIN — AZITHROMYCIN 255 MILLIGRAM(S): 500 TABLET, FILM COATED ORAL at 09:07

## 2024-02-18 RX ADMIN — LEVALBUTEROL 0.63 MILLIGRAM(S): 1.25 SOLUTION, CONCENTRATE RESPIRATORY (INHALATION) at 21:12

## 2024-02-18 RX ADMIN — Medication 25 MILLIGRAM(S): at 22:20

## 2024-02-18 RX ADMIN — Medication 25 MILLIGRAM(S): at 13:38

## 2024-02-18 RX ADMIN — LOSARTAN POTASSIUM 50 MILLIGRAM(S): 100 TABLET, FILM COATED ORAL at 05:30

## 2024-02-18 RX ADMIN — Medication 25 MILLIGRAM(S): at 05:30

## 2024-02-18 RX ADMIN — Medication 3 MILLILITER(S): at 08:35

## 2024-02-18 RX ADMIN — SIMVASTATIN 20 MILLIGRAM(S): 20 TABLET, FILM COATED ORAL at 22:20

## 2024-02-18 NOTE — PROGRESS NOTE ADULT - ASSESSMENT
88y Female w/ PMH of COPD (no home O2), HTN, HLD, and hypothyroidism admitted for acute hypoxic respiratory failure 2/2 AECOPD in setting of influenza infection. Now, pt continues on anti-inflammatories and bronchodilators w/ improvement in hypoxia.    Plan  #Acute hypoxic respiratory failure, 2/2 AECOPD  #AECOPD, 2/2 influenza A  #Influenza A  -Pt initially hypoxic to 88% w/o evidence of hypercapnia  -No leukocytosis or fever in ED  -CXR without concern for superimposed bacterial process  -will taper Solumedrol 40mg to 40mg oral tomorrow   -Continue Duoneb, Symbicort, and Spiriva  -Added azithromycin for anti-inflammatory effects  - she has been weaned off from the o2, now sating well  -Supportive therapy w/ Robitussin and Tessalon  -Incentive spirometry  -Encouraged ambulation and deep breathing exercises  -Monitor CBC and trend fever curve, expect degree of leukocytosis w/ initiation of steroids  - has some tachycardia, will change duo nebs to Xopinex, will see if HR is stable     #Severe asymptomatic hypertension/hypertensive urgency, resolved  #HTN, stable  -Pt had SBP in 210s in ED  -Home Losartan restarted and hydralazine was added  -BP may be elevated 2/2 steroids, so will consider discontinuing hydralazine if readings consistently below 160s/90s  -Monitor vitals    #JOHNATHON, pre-renal  -improved with IV fluids   -Pt developed pre-renal azotemia   -Monitor and trend BMP  -Avoid nephrotoxic agents as possible  -Renally dose medications as applicable    #HLD  -Continue home statin  -Continue home Zetia    #Hypothyroidism  -Continue home Synthroid    DVT PPx: Lovenox sc     Dispo: D/C once HR is better, being weaned off from the IV steriods   possible D/C tomorrow  88y Female w/ PMH of COPD (no home O2), HTN, HLD, and hypothyroidism admitted for acute hypoxic respiratory failure 2/2 AECOPD in setting of influenza infection. Now, pt continues on anti-inflammatories and bronchodilators w/ improvement in hypoxia.    Plan  #Acute hypoxic respiratory failure, 2/2 AECOPD  #AECOPD, 2/2 influenza A  #Influenza A  -Pt initially hypoxic to 88% w/o evidence of hypercapnia  -No leukocytosis or fever in ED  -CXR without concern for superimposed bacterial process  -will taper Solumedrol 40mg to 40mg oral tomorrow   -Continue Duoneb, Symbicort, and Spiriva  -Added azithromycin for anti-inflammatory effects  - she has been weaned off from the o2, now sating well  -Supportive therapy w/ Robitussin and Tessalon  -Incentive spirometry  -Encouraged ambulation and deep breathing exercises  -Monitor CBC and trend fever curve, expect degree of leukocytosis w/ initiation of steroids  - has some tachycardia, will change duo nebs to Xopinex, will see if HR is stable     #Severe asymptomatic hypertension/hypertensive urgency, resolved  #HTN, stable  -Pt had SBP in 210s in ED  -Home Losartan restarted and hydralazine was added  -BP may be elevated 2/2 steroids, so will consider discontinuing hydralazine if readings consistently below 160s/90s  -Monitor vitals    #JOHNATHON, pre-renal  -improved with IV fluids   -Pt developed pre-renal azotemia   -Monitor and trend BMP  -Avoid nephrotoxic agents as possible  -Renally dose medications as applicable    #HLD  -Continue home statin  -Continue home Zetia    #Hypothyroidism  -Continue home Synthroid    DVT PPx: Lovenox sc     Elevated MCV: likely from folate def, has been started on it    Dispo: D/C once HR is better, being weaned off from the IV steriods   possible D/C tomorrow

## 2024-02-18 NOTE — PROGRESS NOTE ADULT - SUBJECTIVE AND OBJECTIVE BOX
CORRINE GARAY    [id number]    88y      Female    Patient is a 88y old  Female who presents with a chief complaint of     INTERVAL HPI/OVERNIGHT EVENTS:    Patient is feeling much better, she is off O2, doing well on RA, denies fever, chills, chest pain, sob       Vital Signs Last 24 Hrs  T(C): 36.7 (18 Feb 2024 09:54), Max: 36.9 (17 Feb 2024 20:58)  T(F): 98.1 (18 Feb 2024 09:54), Max: 98.5 (17 Feb 2024 20:58)  HR: 116 (18 Feb 2024 09:54) (79 - 116)  BP: 135/71 (18 Feb 2024 09:54) (135/71 - 181/89)  RR: 18 (18 Feb 2024 09:54) (18 - 18)  SpO2: 98% (18 Feb 2024 09:54) (93% - 100%)    Parameters below as of 18 Feb 2024 09:54  Patient On (Oxygen Delivery Method): room air        PHYSICAL EXAM:    GENERAL: Elderly female looking comfortable    HEENT: PERRL, +EOMI  NECK: soft, Supple, No JVD  CHEST/LUNG: Decrease bilaterally; No wheezing  HEART: S1S2+, Regular rate and rhythm; No murmurs  ABDOMEN: Soft, Nontender, Nondistended; Bowel sounds present  EXTREMITIES:  1+ Peripheral Pulses, No edema  SKIN: No rashes or lesions  NEURO: AAOX3  PSYCH: normal mood      LABS:                        14.2   13.05 )-----------( 213      ( 18 Feb 2024 07:00 )             44.1     02-18    139  |  102  |  39.4<H>  ----------------------------<  116<H>  4.6   |  24.0  |  1.28    Ca    8.9      18 Feb 2024 07:00  Phos  3.7     02-18  Mg     2.8     02-18        I&O's Summary    17 Feb 2024 07:01  -  18 Feb 2024 07:00  --------------------------------------------------------  IN: 0 mL / OUT: 350 mL / NET: -350 mL        MEDICATIONS  (STANDING):  albuterol/ipratropium for Nebulization 3 milliLiter(s) Nebulizer every 6 hours  azithromycin  IVPB 500 milliGRAM(s) IV Intermittent every 24 hours  azithromycin  IVPB      budesonide 160 MICROgram(s)/formoterol 4.5 MICROgram(s) Inhaler 2 Puff(s) Inhalation two times a day  cholecalciferol 54295 Unit(s) Oral <User Schedule>  enoxaparin Injectable 40 milliGRAM(s) SubCutaneous every 24 hours  ezetimibe 10 milliGRAM(s) Oral daily  folic acid 1 milliGRAM(s) Oral daily  hydrALAZINE 25 milliGRAM(s) Oral every 8 hours  levothyroxine 112 MICROGram(s) Oral daily  losartan 50 milliGRAM(s) Oral daily  oseltamivir 75 milliGRAM(s) Oral two times a day  simvastatin 20 milliGRAM(s) Oral at bedtime  tiotropium 2.5 MICROgram(s) Inhaler 2 Puff(s) Inhalation daily    MEDICATIONS  (PRN):  acetaminophen     Tablet .. 650 milliGRAM(s) Oral every 6 hours PRN Temp greater or equal to 38C (100.4F), Mild Pain (1 - 3)  aluminum hydroxide/magnesium hydroxide/simethicone Suspension 30 milliLiter(s) Oral every 4 hours PRN Dyspepsia  benzonatate 100 milliGRAM(s) Oral every 8 hours PRN Cough  guaiFENesin Oral Liquid (Sugar-Free) 100 milliGRAM(s) Oral every 6 hours PRN Cough  melatonin 3 milliGRAM(s) Oral at bedtime PRN Insomnia  ondansetron Injectable 4 milliGRAM(s) IV Push every 8 hours PRN Nausea and/or Vomiting

## 2024-02-19 ENCOUNTER — TRANSCRIPTION ENCOUNTER (OUTPATIENT)
Age: 89
End: 2024-02-19

## 2024-02-19 VITALS — OXYGEN SATURATION: 95 %

## 2024-02-19 PROCEDURE — 83880 ASSAY OF NATRIURETIC PEPTIDE: CPT

## 2024-02-19 PROCEDURE — 87040 BLOOD CULTURE FOR BACTERIA: CPT

## 2024-02-19 PROCEDURE — 82435 ASSAY OF BLOOD CHLORIDE: CPT

## 2024-02-19 PROCEDURE — 84132 ASSAY OF SERUM POTASSIUM: CPT

## 2024-02-19 PROCEDURE — 99239 HOSP IP/OBS DSCHRG MGMT >30: CPT

## 2024-02-19 PROCEDURE — 85018 HEMOGLOBIN: CPT

## 2024-02-19 PROCEDURE — 71045 X-RAY EXAM CHEST 1 VIEW: CPT

## 2024-02-19 PROCEDURE — 93005 ELECTROCARDIOGRAM TRACING: CPT

## 2024-02-19 PROCEDURE — 85610 PROTHROMBIN TIME: CPT

## 2024-02-19 PROCEDURE — 84436 ASSAY OF TOTAL THYROXINE: CPT

## 2024-02-19 PROCEDURE — 83735 ASSAY OF MAGNESIUM: CPT

## 2024-02-19 PROCEDURE — 85730 THROMBOPLASTIN TIME PARTIAL: CPT

## 2024-02-19 PROCEDURE — 85025 COMPLETE CBC W/AUTO DIFF WBC: CPT

## 2024-02-19 PROCEDURE — 99285 EMERGENCY DEPT VISIT HI MDM: CPT

## 2024-02-19 PROCEDURE — 94640 AIRWAY INHALATION TREATMENT: CPT

## 2024-02-19 PROCEDURE — 0225U NFCT DS DNA&RNA 21 SARSCOV2: CPT

## 2024-02-19 PROCEDURE — 82947 ASSAY GLUCOSE BLOOD QUANT: CPT

## 2024-02-19 PROCEDURE — 96374 THER/PROPH/DIAG INJ IV PUSH: CPT

## 2024-02-19 PROCEDURE — 80053 COMPREHEN METABOLIC PANEL: CPT

## 2024-02-19 PROCEDURE — 84295 ASSAY OF SERUM SODIUM: CPT

## 2024-02-19 PROCEDURE — 84443 ASSAY THYROID STIM HORMONE: CPT

## 2024-02-19 PROCEDURE — 84100 ASSAY OF PHOSPHORUS: CPT

## 2024-02-19 PROCEDURE — 82746 ASSAY OF FOLIC ACID SERUM: CPT

## 2024-02-19 PROCEDURE — 83605 ASSAY OF LACTIC ACID: CPT

## 2024-02-19 PROCEDURE — 84484 ASSAY OF TROPONIN QUANT: CPT

## 2024-02-19 PROCEDURE — 82330 ASSAY OF CALCIUM: CPT

## 2024-02-19 PROCEDURE — 85014 HEMATOCRIT: CPT

## 2024-02-19 PROCEDURE — 85027 COMPLETE CBC AUTOMATED: CPT

## 2024-02-19 PROCEDURE — 36415 COLL VENOUS BLD VENIPUNCTURE: CPT

## 2024-02-19 PROCEDURE — 96375 TX/PRO/DX INJ NEW DRUG ADDON: CPT

## 2024-02-19 PROCEDURE — 82803 BLOOD GASES ANY COMBINATION: CPT

## 2024-02-19 PROCEDURE — 80048 BASIC METABOLIC PNL TOTAL CA: CPT

## 2024-02-19 PROCEDURE — 82607 VITAMIN B-12: CPT

## 2024-02-19 RX ORDER — LABETALOL HCL 100 MG
10 TABLET ORAL ONCE
Refills: 0 | Status: COMPLETED | OUTPATIENT
Start: 2024-02-19 | End: 2024-02-19

## 2024-02-19 RX ORDER — SODIUM CHLORIDE 9 MG/ML
500 INJECTION, SOLUTION INTRAVENOUS ONCE
Refills: 0 | Status: DISCONTINUED | OUTPATIENT
Start: 2024-02-19 | End: 2024-02-19

## 2024-02-19 RX ORDER — FOLIC ACID 0.8 MG
1 TABLET ORAL
Qty: 30 | Refills: 0
Start: 2024-02-19 | End: 2024-03-19

## 2024-02-19 RX ORDER — LOSARTAN/HYDROCHLOROTHIAZIDE 100MG-25MG
1 TABLET ORAL
Qty: 30 | Refills: 0
Start: 2024-02-19 | End: 2024-03-19

## 2024-02-19 RX ORDER — HYDRALAZINE HCL 50 MG
50 TABLET ORAL THREE TIMES A DAY
Refills: 0 | Status: DISCONTINUED | OUTPATIENT
Start: 2024-02-19 | End: 2024-02-19

## 2024-02-19 RX ORDER — LOSARTAN POTASSIUM 100 MG/1
1 TABLET, FILM COATED ORAL
Refills: 0 | DISCHARGE

## 2024-02-19 RX ADMIN — Medication 112 MICROGRAM(S): at 06:13

## 2024-02-19 RX ADMIN — Medication 25 MILLIGRAM(S): at 06:13

## 2024-02-19 RX ADMIN — Medication 50 MILLIGRAM(S): at 11:13

## 2024-02-19 RX ADMIN — Medication 40 MILLIGRAM(S): at 06:12

## 2024-02-19 RX ADMIN — EZETIMIBE 10 MILLIGRAM(S): 10 TABLET ORAL at 11:13

## 2024-02-19 RX ADMIN — TIOTROPIUM BROMIDE 2 PUFF(S): 18 CAPSULE ORAL; RESPIRATORY (INHALATION) at 08:50

## 2024-02-19 RX ADMIN — BUDESONIDE AND FORMOTEROL FUMARATE DIHYDRATE 2 PUFF(S): 160; 4.5 AEROSOL RESPIRATORY (INHALATION) at 07:48

## 2024-02-19 RX ADMIN — LOSARTAN POTASSIUM 50 MILLIGRAM(S): 100 TABLET, FILM COATED ORAL at 06:13

## 2024-02-19 RX ADMIN — LEVALBUTEROL 0.63 MILLIGRAM(S): 1.25 SOLUTION, CONCENTRATE RESPIRATORY (INHALATION) at 07:47

## 2024-02-19 RX ADMIN — LEVALBUTEROL 0.63 MILLIGRAM(S): 1.25 SOLUTION, CONCENTRATE RESPIRATORY (INHALATION) at 03:36

## 2024-02-19 RX ADMIN — Medication 1 MILLIGRAM(S): at 11:13

## 2024-02-19 RX ADMIN — Medication 75 MILLIGRAM(S): at 06:12

## 2024-02-19 RX ADMIN — LEVALBUTEROL 0.63 MILLIGRAM(S): 1.25 SOLUTION, CONCENTRATE RESPIRATORY (INHALATION) at 15:06

## 2024-02-19 RX ADMIN — AZITHROMYCIN 255 MILLIGRAM(S): 500 TABLET, FILM COATED ORAL at 06:12

## 2024-02-19 RX ADMIN — Medication 10 MILLIGRAM(S): at 01:13

## 2024-02-19 NOTE — PHYSICAL THERAPY INITIAL EVALUATION ADULT - ACTIVE RANGE OF MOTION EXAMINATION, REHAB EVAL
[facility]         HOSPITALIST  DISCHARGE SUMMARY    Patient Name: Sarina Trevino  : 1932  MRN: [id number]    Date of Admission: 2022  Date of Discharge:  22  Primary Care Physician: Provider, No Known    Consultants:  Dr. White, orthopedic surgery    Discharge Diagnosis:  Pelvic fracture  Osteopenia  Syncope, vasovagal  Essential hypertension  2.3 cm nodule right lung  Lytic lesion of the proximal right humerus  Dysphagia    Hospital Course     Hospital Course:  88 y/o F with CAD, HTN who presented after sustaining left superior and inferior pubic rami fractures.  The patient stood from a sitting position and had vasovagal syncope resulting in these fractures.  Conservative management, no operative intervention.    Patient admitted, underwent PT OT evaluations as well as speech therapy and rehab placement was recommended.  Patient does have some chronic issues with swallowing pills and was working with speech therapy while inpatient.  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  Patient is being discharged to rehab today.    DISCHARGE Follow Up Recommendations:   Follow-up with PCP  Discharge to rehab today      Day of Discharge     Vital Signs:  Temp:  [97.8 °F (36.6 °C)-99.1 °F (37.3 °C)] 98.6 °F (37 °C)  Heart Rate:  [77-87] 83  Resp:  [16-18] 18  BP: (112-161)/(56-76) 152/76  Physical Exam:   Gen: NAD, WDWN  Resp: no dyspnea  CV: no LE edema  GI: Abdomen soft +bs  Psych: AOx3, normal mood and affect    Discharge Details        Discharge Medications      New Medications      Instructions Start Date   acetaminophen 500 MG tablet  Commonly known as: TYLENOL  Replaces: acetaminophen 650 MG 8 hr tablet   1,000 mg, Oral, 3 Times Daily      Calcium Carbonate-Vitamin D3 600-400 MG-UNIT tablet   1 tablet, Oral,  Med: Losartan   Dosage: 50 mg   Sig:  Take 1.5 tablets by mouth daily   Quantity requested:  135    Mail Order: no    Preferred pharmacy has been set up and verified.  [facility]    Daily   Start Date: January 10, 2022     cholecalciferol 25 MCG (1000 UT) tablet  Commonly known as: VITAMIN D3   1,000 Units, Oral, Daily   Start Date: January 10, 2022     lidocaine 5 %  Commonly known as: LIDODERM   1 patch, Transdermal, Every 24 Hours Scheduled, Remove & Discard patch within 12 hours or as directed by MD   Start Date: January 10, 2022     oxyCODONE 5 MG immediate release tablet  Commonly known as: ROXICODONE   5 mg, Oral, Every 4 Hours PRN      oxyCODONE 10 MG tablet  Commonly known as: ROXICODONE   10 mg, Oral, Every 4 Hours PRN      polyethylene glycol 17 g packet  Commonly known as: MIRALAX   17 g, Oral, Daily PRN         Changes to Medications      Instructions Start Date   telmisartan 80 MG tablet  Commonly known as: MICARDIS  What changed:   how much to take  when to take this   80 mg, Oral, 2 Times Daily, Or as directed.         Continue These Medications      Instructions Start Date   aspirin 81 MG EC tablet   81 mg, Oral, Daily      hydroCHLOROthiazide 25 MG tablet  Commonly known as: HYDRODIURIL   25 mg, Oral, Daily      metoprolol succinate XL 25 MG 24 hr tablet  Commonly known as: TOPROL-XL   25 mg, Oral, Daily         Stop These Medications    acetaminophen 650 MG 8 hr tablet  Commonly known as: TYLENOL  Replaced by: acetaminophen 500 MG tablet            Discharge Disposition:   Rehab Facility or Unit (DC - External)    Discharge Condition: Stable    Diet:  Hospital:  Diet Order   Procedures   • Diet Regular; Cardiac       Discharge Activity: Per PT OT      Future Appointments   Date Time Provider Department Center   5/4/2022 10:15 AM Andrés Curiel MD [facility]       Additional Instructions for the Follow-ups that You Need to Schedule     Discharge Follow-up with PCP   As directed       Currently Documented PCP:    Provider, No Known    PCP Phone Number:    None     Follow Up Details: 1 week               Pertinent  and/or Most Recent Results     PROCEDURES:   None    LAB  RESULTS:      Lab 01/05/22  0510 01/04/22  0424 01/02/22  1240   WBC 9.13 13.00* 18.05*   HEMOGLOBIN 8.9* 9.1* 11.0*   HEMATOCRIT 27.0* 28.0* 33.1*   PLATELETS 244 255 314   NEUTROS ABS  --   --  15.62*   IMMATURE GRANS (ABS)  --   --  0.17*   LYMPHS ABS  --   --  1.27   MONOS ABS  --   --  0.89   EOS ABS  --   --  0.04   MCV 96.1 96.2 93.2         Lab 01/05/22  0510 01/04/22  0424 01/03/22  0425 01/02/22  1240   SODIUM 134* 136 136 134*   POTASSIUM 3.9 3.9 3.3* 3.9   CHLORIDE 102 103 100 95*   CO2 24.3 23.5 25.9 26.4   ANION GAP 7.7 9.5 10.1 12.6   BUN 23 28* 33* 29*   CREATININE 1.00 1.07* 1.08* 1.03*   GLUCOSE 108* 118* 107* 109*   CALCIUM 8.3* 8.1* 8.2* 9.5   MAGNESIUM  --   --  2.2 2.3   PHOSPHORUS  --   --  4.3  --    TSH  --   --   --  2.590  2.530       INPATIENT IMAGING  PROCEDURE: CT CHEST WO CONTRAST DIAGNOSTIC       COMPARISON: [facility], CR, XR CHEST 1 VW, 1/02/2022, 16:43.  [facility], CT, ABDOMEN/PELVIS WITH CONTRAST, 9/08/2020, 16:27.       INDICATIONS: lung mass, lytic lesion of proximal right humerus       TECHNIQUE: CT images were created without the administration of contrast material.         PROTOCOL:   Standard imaging protocol performed       RADIATION:   DLP: 183 mGy*cm     Automated exposure control was utilized to minimize radiation dose.       FINDINGS:   Bilateral upper lobe nodular scarring.  Multifocal clustered nodularity and micro nodularity.  Many   of these areas have associated bronchiectasis.  A nodule in the right upper lobe measures 10 mm.  A   nodule in the right middle lobe measures 2.3 cm.  No adenopathy in the chest.  No acute findings in   the included upper abdomen.  No aggressive appearing bone change.         CONCLUSION: Multifocal clustered nodularity and micro nodularity, with some of the areas having   associated bronchiectasis.  Findings are favored to represent chronic infectious or inflammatory   airways disease.  The largest nodule  measures up to 2.3 cm.  Recommend close attention on   follow-up.       Question of a lytic lesion in the proximal right humerus on the comparison chest radiograph.  The   finding is not clearly confirmed on this study.  Dedicated shoulder imaging may be warranted if   there is ongoing clinical concern.                PEG PHAM MD         Electronically Signed and Approved By: PEG PHAM MD on 1/04/2022 at 14:58                      XR Chest 1 View [[id number]] Jose Luis as Reviewed   Order Status: Completed Collected: 01/02/22 1738    Updated: 01/02/22 1741   Narrative:     PROCEDURE: XR CHEST 1 VW       COMPARISON: [facility], CR, XR HIP W OR WO PELVIS 2-3 VIEW LEFT, 1/02/2022, 12:11.     [facility], CR, CHEST PA/AP & LAT 2V, 4/06/2018, 12:46.  [facility], CR, CHEST PA/AP & LAT 2V,   6/25/2019, 11:03.  [facility], CT, CT CERVICAL SPINE WO CONTRAST, 1/02/2022, 13:03.     [facility], CR, CHEST PA/AP & LAT 2V, 9/08/2020, 11:42.       INDICATIONS: cough, leukocytosis       FINDINGS:   Heart size and pulmonary vessels are within normal limits.  There is a 2.3 cm nodule within the   lateral aspect of the mid right lung.  No other definite pulmonary nodule identified.  There are   coarsened interstitial markings throughout both lungs.  No focal airspace consolidation.  No   pleural effusion or pneumothorax.  There is a questionable lytic lesion within the proximal right   humerus, incompletely imaged on this exam.  There is minimal dextro convex scoliosis of the upper   thoracic spine.       CONCLUSION:   1. New 2.3 cm nodule within the lateral aspect of the right mid lung.  CT scan of the chest with IV   contrast would be useful for further evaluation when clinically feasible.   2. Questionable lytic lesion within the proximal right humerus.  This may be included within the   field of view on future chest CT.  Dedicated radiographs of the right shoulder may also be useful   for further  evaluation.                        ALONZO CLAUDIO MD         Electronically Signed and Approved By: ALONZO CLAUDIO MD on 1/02/2022 at 17:38                      CT Head Without Contrast [[id number]] Jose Luis as Reviewed   Order Status: Completed Collected: 01/02/22 1316    Updated: 01/02/22 1319   Narrative:     PROCEDURE: CT HEAD WO CONTRAST       COMPARISON: [facility], CT, HEAD W/O CONTRAST, 9/08/2020, 16:23.   INDICATIONS: trauma       PROTOCOL:   Standard imaging protocol performed       RADIATION:   DLP: 1346.5mGy*cm     MA and/or KV was adjusted to minimize radiation dose.           TECHNIQUE: After obtaining the patient's consent, CT images were obtained without non-ionic   intravenous contrast material.       FINDINGS:   There is mild generalized parenchymal volume loss.  There is no evidence of acute infarct or   hemorrhage.  There is patchy low attenuation within the periventricular white matter bilaterally   compatible changes of chronic small vessel ischemic disease.  There is partial opacification of   bilateral frontal sinuses.  Other paranasal sinuses and mastoid air cells are clear.  No acute   skull fracture identified.  Globes and orbits are within normal limits.       CONCLUSION:   1. Mild generalized parenchymal volume loss and changes of chronic small vessel ischemic disease.   2. No acute intracranial abnormality.  No acute skull fracture.                ALONZO CLAUDIO MD         Electronically Signed and Approved By: ALONZO CLAUDIO MD on 1/02/2022 at 13:16                      CT Cervical Spine Without Contrast [[id number]] Jose Luis as Reviewed   Order Status: Completed Collected: 01/02/22 1322    Updated: 01/02/22 1325   Narrative:     PROCEDURE: CT CERVICAL SPINE WO CONTRAST        6 mm noncalcified COMPARISON: [facility], CT, CT CHEST W/ CONTRAST,   2/23/2009, 9:10.       INDICATIONS: neck pain       PROTOCOL:   Standard imaging protocol performed       RADIATION:        MA and/or KV was adjusted to minimize radiation dose.           TECHNIQUE: After obtaining the patient's consent, multi-planar CT images were created without   contrast material.         FINDINGS:   There is normal height and alignment of the cervical vertebral bodies.  No acute fracture is seen.     Craniovertebral junction appears within normal limits.  There is degenerative disc space narrowing   throughout the entire cervical spine.  No significant spinal canal stenosis identified.  Facet   joints are intact.  There are degenerative changes throughout the cervical spine with only mild   bilateral neural foraminal narrowing at C4/5-C5/6 levels.  The unenhanced soft tissues are within   normal limits.  There are subcentimeter noncalcified nodules in both lung apices, unchanged from   2009.       CONCLUSION:   1. No acute cervical spine fracture or malalignment.                ALONZO CLAUDIO MD         Electronically Signed and Approved By: ALONZO CLAUDIO MD on 1/02/2022 at 13:22                      XR Spine Lumbar 2 or 3 View [[id number]] Jose Luis as Reviewed   Order Status: Completed Collected: 01/02/22 1313    Updated: 01/02/22 1316   Narrative:     PROCEDURE: XR SPINE LUMBAR 2 OR 3 VW       COMPARISON: None       INDICATIONS: FELL COMPLAINS OF LOWER BACK PAIN       FINDINGS:   Is mild osteopenia.  There is degenerative disc space narrowing throughout the entire lumbar spine.    On the AP view there is dextro convex scoliosis of the lumbar spine centered near the L4 level.     No vertebral body anomaly identified.  Thoracolumbar junction appears within normal limits.  There   is atherosclerotic vascular calcifications throughout the aorta.  Sacroiliac joints appear intact.       CONCLUSION:   1. Osteopenia and degenerative disc disease throughout the lumbar spine.  No acute bony   abnormality.                ALONZO CLAUDIO MD         Electronically Signed and Approved By: ALONZO CLAUDIO MD on 1/02/2022 at  13:13                      XR Hip With or Without Pelvis 2 - 3 View Left [[id number]] Jose Luis as Reviewed   Order Status: Completed Collected: 01/02/22 1241    Updated: 01/02/22 1244   Narrative:     PROCEDURE: XR HIP W OR WO PELVIS 2-3 VIEW LEFT       COMPARISON: None       INDICATIONS: FALL       FINDINGS:   There are acute mildly displaced fractures of the left superior and inferior pubic rami.  Fracture   lines do not appear to involve the acetabulum.  Sacroiliac joints appear intact.  There is mild   narrowing of the hip joint spaces bilaterally.  No other acute fractures are seen.  There are   degenerative changes of the lower lumbar spine.       CONCLUSION:   1. Acute fractures of the left superior and inferior pubic rami   2. Minimal degenerative change of the hip joints bilaterally.                    ALONZO CLAUDIO MD         Electronically Signed and Approved By: ALONZO CLAUDIO MD on 1/02/2022 at 12:41                   Time spent on Discharge including face to face service: Greater than 35 minutes    Electronically signed by Miriam Anders MD, 01/09/22, 12:07 PM EST.       bilateral upper extremity Active ROM was WFL (within functional limits)/bilateral  lower extremity Active ROM was WFL (within functional limits)

## 2024-02-19 NOTE — DISCHARGE NOTE PROVIDER - NSDCMRMEDTOKEN_GEN_ALL_CORE_FT
Advair Diskus 250 mcg-50 mcg inhalation powder: 1 inhaled once a day  folic acid 1 mg oral tablet: 1 tab(s) orally once a day  levothyroxine 112 mcg (0.112 mg) oral tablet: 1 tab(s) orally once a day  losartan-hydrochlorothiazide 50 mg-12.5 mg oral tablet: 1 tab(s) orally once a day  Optimal-D3 1250 mcg (50,000 intl units) oral capsule: 1 cap(s) orally every 7 days  predniSONE 20 mg oral tablet: 2 tab(s) orally once a day  simvastatin 20 mg oral tablet: 1 tab(s) orally once a day (at bedtime)  Spiriva 18 mcg inhalation capsule: 1 cap(s) inhaled once a day  Zetia 10 mg oral tablet: 1 tab(s) orally once a day

## 2024-02-19 NOTE — DISCHARGE NOTE PROVIDER - NSDCFUSCHEDAPPT_GEN_ALL_CORE_FT
Deshawn Vidales  [facility]  PULMMED [address]  Scheduled Appointment: 03/07/2024

## 2024-02-19 NOTE — DISCHARGE NOTE NURSING/CASE MANAGEMENT/SOCIAL WORK - PATIENT PORTAL LINK FT
You can access the FollowMyHealth Patient Portal offered by [facility] by registering at the following website: [email or web address]. By joining [employer]’s FollowMyHealth portal, you will also be able to view your health information using other applications (apps) compatible with our system.

## 2024-02-19 NOTE — DISCHARGE NOTE PROVIDER - ATTENDING DISCHARGE PHYSICAL EXAMINATION:
GENERAL: no acute distress, comfortably in bed  HEENT: Atraumatic, normocephalic, non-icteric, no JVD  NEURO:  A&Ox3, no focal deficits, moving all extremities spontaneously, no dysarthria, CN II-XII grossly intact  PSYCH: Normal affect, calm, appropriate insight and judgment, fluent speech  LUNGS: Mild wheezing, non-labored breathing  HEART: RRR, no murmur appreciated  ABD: Soft, non-tender, non-distended, no organomegaly, no appreciable masses, +bs all 4 quadrants  EXTREMITIES: Nontender, no clubbing, cyanosis, or edema  SKIN: No rashes or lesions     Vital Signs Last 24 Hrs  T(C): 36.8 (19 Feb 2024 08:26), Max: 37.2 (18 Feb 2024 20:16)  T(F): 98.2 (19 Feb 2024 08:26), Max: 98.9 (18 Feb 2024 20:16)  HR: 90 (19 Feb 2024 08:26) (85 - 111)  BP: 175/98 (19 Feb 2024 08:26) (147/66 - 185/85)  BP(mean): --  RR: 17 (19 Feb 2024 08:26) (17 - 20)  SpO2: 94% (19 Feb 2024 08:26) (94% - 99%)    Parameters below as of 19 Feb 2024 08:26  Patient On (Oxygen Delivery Method): room air

## 2024-02-19 NOTE — DISCHARGE NOTE PROVIDER - PROVIDER TOKENS
PROVIDER:[TOKEN:[[id number]],FOLLOWUP:[1 week]],PROVIDER:[TOKEN:[[id number]],FOLLOWUP:[1-3 days]]

## 2024-02-19 NOTE — DISCHARGE NOTE PROVIDER - NSDCCPCAREPLAN_GEN_ALL_CORE_FT
PRINCIPAL DISCHARGE DIAGNOSIS  Diagnosis: COPD exacerbation  Assessment and Plan of Treatment: Chronic Obstructive Pulmonary Disease  Chronic obstructive pulmonary disease (COPD) is a lung condition in which airflow from the lungs is limited. Causes include smoking, secondhand smoke exposure, genetics, or recurrent infections. Take all medicines (inhaled or pills) as directed by your health care provider. Avoid exposure to irritants such as smoke, chemicals, and fumes that aggravate your breathing.  If you are a smoker, the most important thing that you can do is stop smoking. Continuing to smoke will cause further lung damage and breathing trouble. Ask your health care provider for help with quitting smoking.  SEEK IMMEDIATE MEDICAL CARE IF YOU HAVE ANY OF THE FOLLOWING SYMPTOMS: shortness of breath at rest or when talking, bluish discoloration of lips, skin, fever, worsening cough, unexplained chest pain, or lightheadedness/dizziness.   COMPLETE YOUR COURSE OF STEROIDS      SECONDARY DISCHARGE DIAGNOSES  Diagnosis: Influenza A  Assessment and Plan of Treatment: You were treated for the flu with Tamiflu.    Diagnosis: Uncontrolled hypertension  Assessment and Plan of Treatment: Your blood pressure was high on this admission. Although you started a steroid course, which can cause temporary blood pressure increases, you also had a history of uncontrolled blood pressure before coming to the hospital. Thus, you were started on a new bed - hydrochlorothiazide-losartan combination pill. Takes this as prescribed and see your PCP within 3 days.     PRINCIPAL DISCHARGE DIAGNOSIS  Diagnosis: COPD exacerbation  Assessment and Plan of Treatment: Chronic Obstructive Pulmonary Disease  Chronic obstructive pulmonary disease (COPD) is a lung condition in which airflow from the lungs is limited. Causes include smoking, secondhand smoke exposure, genetics, or recurrent infections. Take all medicines (inhaled or pills) as directed by your health care provider. Avoid exposure to irritants such as smoke, chemicals, and fumes that aggravate your breathing.  If you are a smoker, the most important thing that you can do is stop smoking. Continuing to smoke will cause further lung damage and breathing trouble. Ask your health care provider for help with quitting smoking.  SEEK IMMEDIATE MEDICAL CARE IF YOU HAVE ANY OF THE FOLLOWING SYMPTOMS: shortness of breath at rest or when talking, bluish discoloration of lips, skin, fever, worsening cough, unexplained chest pain, or lightheadedness/dizziness.   COMPLETE YOUR COURSE OF STEROIDS      SECONDARY DISCHARGE DIAGNOSES  Diagnosis: Influenza A  Assessment and Plan of Treatment: You were treated for the flu with Tamiflu.    Diagnosis: Uncontrolled hypertension  Assessment and Plan of Treatment: Your blood pressure was high on this admission. Although you started a steroid course, which can cause temporary blood pressure increases, you also had a history of uncontrolled blood pressure before coming to the hospital. Thus, you were started on a new bed - hydrochlorothiazide-losartan combination pill. Takes this as prescribed and see your PCP within 3 days.    Diagnosis: Folate deficiency anemia  Assessment and Plan of Treatment: Your folate levels are low, and folate is importan in the production of red blood cells. We started you on a folate vitmain, which will help with any anemia.

## 2024-02-19 NOTE — DISCHARGE NOTE PROVIDER - HOSPITAL COURSE
Pt is a 88yFemale with a PMH of COPD (no home O2), HTN, HLD, and hypothyroidism who presented with SOB and cough. In the ED, pt was hypoxic to 88% on ambient air with increased work of breathing, so was placed on NC. Found to be influenza A positive, but CXR w/o concern for superimposed bacterial process, so pt was admitted for AECOPD 2/2 influenza A and started on steroids, bronchodilators, and Tamiflu.      Pt was treated w/ Tamiflu for flu. Steroids tapered to PO w/ improved oxygenation status. Pt no longer requiring supplemental O2 and ambulating the floor without dyspnea. To be sent home on additional 3 days of steroids for 7 days total. Pt was having uncontrolled HTN. Reviewed outpt records and pt was to be started on HCTZ-losartan combo pill, so will start on that med with close outpt follow up.    Patient was explained hospital course, risks and benefits of treatment, and discharge planning, along with follow-up. Patient expresses understanding of all of the above. Patient is medically stable for discharge with appropriate followup. Pt is a 88yFemale with a PMH of COPD (no home O2), HTN, HLD, and hypothyroidism who presented with SOB and cough. In the ED, pt was hypoxic to 88% on ambient air with increased work of breathing, so was placed on NC. Found to be influenza A positive, but CXR w/o concern for superimposed bacterial process, so pt was admitted for AECOPD 2/2 influenza A and started on steroids, bronchodilators, and Tamiflu.      Pt was treated w/ Tamiflu for flu. Steroids tapered to PO w/ improved oxygenation status. Pt no longer requiring supplemental O2 and ambulating the floor without dyspnea. To be sent home on additional 3 days of steroids for 7 days total. Pt was having uncontrolled HTN. Reviewed outpt records and pt was to be started on HCTZ-losartan combo pill, so will start on that med with close outpt follow up.    Started on folate for macrocytosis w/ low folate levels.    Patient was explained hospital course, risks and benefits of treatment, and discharge planning, along with follow-up. Patient expresses understanding of all of the above. Patient is medically stable for discharge with appropriate followup.

## 2024-02-19 NOTE — DISCHARGE NOTE PROVIDER - CARE PROVIDER_API CALL
Deshawn Vidales  Pulmonary Disease  [address], [address]  [place] [address]  Phone: [phone]  Fax: [phone]  Follow Up Time: 1 week    Vero Bassett  Internal Medicine  [address] [place] [id number]  Phone: [phone]  Fax: [phone]  Follow Up Time: 1-3 days

## 2024-02-19 NOTE — PHYSICAL THERAPY INITIAL EVALUATION ADULT - PERTINENT HX OF CURRENT PROBLEM, REHAB EVAL
89 y/o female with PMH of COPD not on home oxygen, HTN, HLD, hypothyroidism was sent to the ED from PCP office for evaluation of shortness of breath. Patient reported shortness of breath that started yesterday night, she noted chronic cough but worsened from baseline with associated weakness and chills. Patient and daughter said family at home have been sick with URI symptoms, daughter checked herself for covid was negative

## 2024-02-19 NOTE — PHYSICAL THERAPY INITIAL EVALUATION ADULT - ADDITIONAL COMMENTS
Pt AxOx4 states she lives at home with daughter with 12 steps downstairs to her apartment with HR. Pt was independent PTA, does not use RW often but does own one. Pt has assist available if needed.

## 2024-02-20 LAB
CULTURE RESULTS: SIGNIFICANT CHANGE UP
CULTURE RESULTS: SIGNIFICANT CHANGE UP
SPECIMEN SOURCE: SIGNIFICANT CHANGE UP
SPECIMEN SOURCE: SIGNIFICANT CHANGE UP

## 2024-03-07 ENCOUNTER — APPOINTMENT (OUTPATIENT)
Dept: PULMONOLOGY | Facility: CLINIC | Age: 89
End: 2024-03-07
Payer: MEDICARE

## 2024-03-07 VITALS
HEIGHT: 57.5 IN | OXYGEN SATURATION: 97 % | SYSTOLIC BLOOD PRESSURE: 124 MMHG | BODY MASS INDEX: 30.43 KG/M2 | RESPIRATION RATE: 16 BRPM | HEART RATE: 72 BPM | DIASTOLIC BLOOD PRESSURE: 60 MMHG | WEIGHT: 143 LBS

## 2024-03-07 PROBLEM — I10 ESSENTIAL (PRIMARY) HYPERTENSION: Chronic | Status: ACTIVE | Noted: 2024-02-15

## 2024-03-07 PROBLEM — E78.5 HYPERLIPIDEMIA, UNSPECIFIED: Chronic | Status: ACTIVE | Noted: 2024-02-15

## 2024-03-07 PROBLEM — E03.9 HYPOTHYROIDISM, UNSPECIFIED: Chronic | Status: ACTIVE | Noted: 2024-02-15

## 2024-03-07 PROBLEM — Z87.09 PERSONAL HISTORY OF OTHER DISEASES OF THE RESPIRATORY SYSTEM: Chronic | Status: ACTIVE | Noted: 2024-02-15

## 2024-03-07 PROCEDURE — 99215 OFFICE O/P EST HI 40 MIN: CPT

## 2024-03-07 RX ORDER — CHOLECALCIFEROL (VITAMIN D3) 1250 MCG
CAPSULE ORAL
Refills: 0 | Status: ACTIVE | COMMUNITY

## 2024-03-07 RX ORDER — CHROMIUM 200 MCG
TABLET ORAL
Refills: 0 | Status: ACTIVE | COMMUNITY

## 2024-03-07 RX ORDER — LOSARTAN POTASSIUM 50 MG/1
50 TABLET, FILM COATED ORAL DAILY
Qty: 90 | Refills: 3 | Status: DISCONTINUED | COMMUNITY
Start: 2023-12-18 | End: 2024-03-07

## 2024-03-07 RX ORDER — AMLODIPINE BESYLATE 5 MG/1
TABLET ORAL
Refills: 0 | Status: ACTIVE | COMMUNITY

## 2024-03-07 NOTE — HISTORY OF PRESENT ILLNESS
[Dyspnea at Rest] : dyspnea at rest [Short-Acting Beta Agonists] : short-acting beta agonists [Long-Acting Beta Agonists] : long-acting beta agonists [Good Compliance] : good compliance with treatment [Anticholinergics (Inhalation)] : inhaled anticholinergics [Good Tolerance] : good tolerance of treatment [Good Symptom Control] : good symptom control [Excess Weight] : excess weight [Inability to Lose Weight] : inability to lose weight [Poor Compliance] : poor compliance with treatment [Low Calorie Diet] : low calorie diet [Poor Tolerance] : poor tolerance of treatment [Hypertension] : hypertension [Poor Symptom Control] : poor symptom control [Low Calorie] : low calorie [High] : high [Well Balanced Diet] : well balanced meals [Sedentary] : is sedentary [Follow-Up - Routine Clinic] : a routine clinic follow-up of [Excessive Daytime Sleepiness] : excessive daytime sleepiness [Snoring] : snoring [Sleepy When Sedentary] : sleepy when sedentary [Unrefreshing Sleep] : unrefreshing sleep [Currently Experiencing] : The patient is currently experiencing symptoms. [None] : No associated symptoms are reported [TextBox_4] : Former smoker of up to 2.5 ppd x 30 years, quit 1985. Patient reports increased SOBOE especially by mid-day but otherwise without cough, fevers, chills or other respiratory complaints. Still works at [employer].  Pt was hospitalized from 2/15-2/19/24 for AECOPD with the following d/c summary: Hospital Course: Pt is a 88yFemale with a PMH of COPD (no home O2), HTN, HLD, and hypothyroidism who presented with SOB and cough. In the ED, pt was hypoxic to 88% on ambient air with increased work of breathing, so was placed on NC. Found to be influenza A positive, but CXR w/o concern for superimposed bacterial process. Pt was admitted for AECOPD 2/2 influenza A and started on steroids, BD, and Tamiflu. Steroids tapered to PO w/ improved oxygenation status. Pt no longer requiring supplemental O2 and ambulating the floor without dyspnea. To be sent home on additional 3 days of steroids for 7 days total. Pt was having uncontrolled HTN. Reviewed outpt records and pt was to be started on HCTZ-losartan combo pill, so will start on that med with close outpt follow up.   Pt near baseline since d/c. [Productive Cough] : no productive cough [FreeTextEntry1] : \par

## 2024-03-07 NOTE — RESULTS/DATA
[TextEntry] : Chest x-ray from 12/2/14 and 10/11/21 were negative by report.  Home PSG from 9/24/21 revealed mild LAYO with an AHI of 9.5 but with significant desaturation. CXR from 2/15/24 was clear.

## 2024-03-07 NOTE — CONSULT LETTER
[Dear  ___] : Dear  [unfilled], [Please see my note below.] : Please see my note below. [Consult Letter:] : I had the pleasure of evaluating your patient, [unfilled]. [Sincerely,] : Sincerely, [Consult Closing:] : Thank you very much for allowing me to participate in the care of this patient.  If you have any questions, please do not hesitate to contact me. [FreeTextEntry3] : Deshawn Vidales MD, FCCP, D. ABSM\par  Pulmonary and Sleep Medicine\par  [facility] [place]\par

## 2024-03-07 NOTE — REASON FOR VISIT
[Follow-Up - From Hospitalization] : a follow-up visit after a recent hospitalization [Sleep Apnea] : sleep apnea [COPD] : COPD [Shortness of Breath] : shortness of breath [Obesity] : obesity [TextBox_44] : Smoking hx

## 2024-03-07 NOTE — DISCUSSION/SUMMARY
[FreeTextEntry1] :   #1. The patient has evidence of Gold stage II-III COPD for which she will continue her Advair 250, Spiriva, and p.r.n. albuterol (she prefers Ventolin)   #2. I have recommended diet and exercise for weight loss and to improve her exercise tolerance.   #3. Diet and exercise for weight loss #4. Will follow lung function periodically; last was at baseline #5. Oncology f/u for breast cancer as needed #6. Nebulizer therapy for COPD/wheeze with Albuterol as needed; Rx new nebulizer #7. She does not appear to require home O2 as she did not desaturate at rest or on exertion previously or reportedly during her hospitalization. #8. Rec autoCPAP to treat mild LAYO with an AHI of 9.5 with significant desaturations in the past but pt refused and does not want to pursue further w/u of her LAYO. The patient understands the risks of untreated LAYO including heart disease, strokes, hypertension, pulmonary hypertension, and motor vehicle accidents as well as the need for treatment and weight loss; consider nocturnal oximetry if decides to pursue to ensure resolution of nocturnal hypoxia but does not want further w/u for now. #9. S/p both covid vaccines and booster #10. F/u in 3 months. #11. Prednisone taper as needed.  The patient expressed understanding and agreement with the above recommendations/plan and accepts responsibility to be compliant with recommended testing, therapies, and f/u visits. All relevant questions and concerns were addressed.

## 2024-03-07 NOTE — PHYSICAL EXAM
[Well Developed] : well developed [No Acute Distress] : no acute distress [Well Nourished] : well nourished [Normal Appearance] : normal appearance [Supple] : supple [Normal Rate/Rhythm] : normal rate/rhythm [Normal S1, S2] : normal s1, s2 [No Murmurs] : no murmurs [No Resp Distress] : no resp distress [No Acc Muscle Use] : no acc muscle use [Clear to Auscultation Bilaterally] : clear to auscultation bilaterally [Normal Rhythm and Effort] : normal rhythm and effort [No Abnormalities] : no abnormalities [Benign] : benign [Not Tender] : not tender [Soft] : soft [Normal Gait] : normal gait [No Clubbing] : no clubbing [No Cyanosis] : no cyanosis [No Edema] : no edema [Oriented x3] : oriented x3 [No Focal Deficits] : no focal deficits

## 2024-06-06 ENCOUNTER — APPOINTMENT (OUTPATIENT)
Dept: PULMONOLOGY | Facility: CLINIC | Age: 89
End: 2024-06-06
Payer: MEDICARE

## 2024-06-06 VITALS
WEIGHT: 144 LBS | SYSTOLIC BLOOD PRESSURE: 116 MMHG | HEART RATE: 83 BPM | OXYGEN SATURATION: 96 % | RESPIRATION RATE: 16 BRPM | DIASTOLIC BLOOD PRESSURE: 60 MMHG | BODY MASS INDEX: 30.64 KG/M2 | HEIGHT: 57.5 IN

## 2024-06-06 DIAGNOSIS — G47.33 OBSTRUCTIVE SLEEP APNEA (ADULT) (PEDIATRIC): ICD-10-CM

## 2024-06-06 DIAGNOSIS — Z87.891 PERSONAL HISTORY OF NICOTINE DEPENDENCE: ICD-10-CM

## 2024-06-06 DIAGNOSIS — R06.02 SHORTNESS OF BREATH: ICD-10-CM

## 2024-06-06 DIAGNOSIS — R05.9 COUGH, UNSPECIFIED: ICD-10-CM

## 2024-06-06 PROCEDURE — 99214 OFFICE O/P EST MOD 30 MIN: CPT

## 2024-06-06 PROCEDURE — G2211 COMPLEX E/M VISIT ADD ON: CPT

## 2024-06-06 RX ORDER — ALBUTEROL SULFATE 90 UG/1
108 (90 BASE) INHALANT RESPIRATORY (INHALATION)
Qty: 3 | Refills: 3 | Status: ACTIVE | COMMUNITY
Start: 2024-06-06 | End: 1900-01-01

## 2024-06-06 RX ORDER — PREDNISONE 10 MG/1
10 TABLET ORAL
Qty: 50 | Refills: 1 | Status: ACTIVE | COMMUNITY
Start: 2024-03-07 | End: 1900-01-01

## 2024-06-06 RX ORDER — FLUTICASONE FUROATE, UMECLIDINIUM BROMIDE AND VILANTEROL TRIFENATATE 200; 62.5; 25 UG/1; UG/1; UG/1
200-62.5-25 POWDER RESPIRATORY (INHALATION) DAILY
Qty: 3 | Refills: 3 | Status: ACTIVE | COMMUNITY
Start: 2024-06-06 | End: 1900-01-01

## 2024-06-06 NOTE — CONSULT LETTER
[Dear  ___] : Dear  [unfilled], [Consult Letter:] : I had the pleasure of evaluating your patient, [unfilled]. [Please see my note below.] : Please see my note below. [Consult Closing:] : Thank you very much for allowing me to participate in the care of this patient.  If you have any questions, please do not hesitate to contact me. [Sincerely,] : Sincerely, [FreeTextEntry3] : Deshawn Vidales MD, FCCP, D. ABSM\par  Pulmonary and Sleep Medicine\par  [facility] at [place]\par

## 2024-06-06 NOTE — DISCUSSION/SUMMARY
[FreeTextEntry1] :   #1. The patient has evidence of Gold stage II-III COPD for which she was using Advair 250, Spiriva, and p.r.n. albuterol (she prefers Ventolin) but given increased SOBOE, will change to Trelegy 200 and evaluate response though would be similar to her current regimen. #2. I have recommended diet and exercise for weight loss and to improve her exercise tolerance. #3. Diet and exercise for weight loss. #4. Will follow lung function periodically; last was at baseline but will repeat on Trelegy for any improvement. #5. Oncology f/u for breast cancer as needed. #6. Nebulizer therapy for COPD/wheeze with Albuterol as needed; Rx new nebulizer in the past. #7. She does not appear to require home O2 as she did not desaturate at rest or on exertion previously or reportedly during her hospitalization. #8. Offered autoCPAP to treat mild LAYO with an AHI of 9.5 with significant desaturations in the past but pt refused and does not want to pursue further w/u of her LAYO. The patient understands the risks of untreated LAYO including heart disease, strokes, hypertension, pulmonary hypertension, and motor vehicle accidents as well as the need for treatment and weight loss; consider nocturnal oximetry if decides to pursue to ensure resolution of nocturnal hypoxia but does not want further w/u for now. #9. S/p both covid vaccines and booster #10. F/u in 3 months with PFTs on Trelegy. #11. Prednisone taper as needed. Rx given to pt.  The patient expressed understanding and agreement with the above recommendations/plan and accepts responsibility to be compliant with recommended testing, therapies, and f/u visits. All relevant questions and concerns were addressed.

## 2024-06-06 NOTE — HISTORY OF PRESENT ILLNESS
[Dyspnea at Rest] : dyspnea at rest [Short-Acting Beta Agonists] : short-acting beta agonists [Long-Acting Beta Agonists] : long-acting beta agonists [Anticholinergics (Inhalation)] : inhaled anticholinergics [Good Compliance] : good compliance with treatment [Good Tolerance] : good tolerance of treatment [Good Symptom Control] : good symptom control [Excess Weight] : excess weight [Inability to Lose Weight] : inability to lose weight [Low Calorie Diet] : low calorie diet [Poor Compliance] : poor compliance with treatment [Poor Tolerance] : poor tolerance of treatment [Poor Symptom Control] : poor symptom control [Hypertension] : hypertension [High] : high [Low Calorie] : low calorie [Well Balanced Diet] : well balanced meals [Sedentary] : is sedentary [Follow-Up - Routine Clinic] : a routine clinic follow-up of [Excessive Daytime Sleepiness] : excessive daytime sleepiness [Snoring] : snoring [Unrefreshing Sleep] : unrefreshing sleep [Sleepy When Sedentary] : sleepy when sedentary [Currently Experiencing] : The patient is currently experiencing symptoms. [None] : The patient is not currently being treated for this problem [TextBox_4] : Former smoker of up to 2.5 ppd x 30 years, quit 1985. Patient reports increased SOBOE especially by mid-day but otherwise without cough, fevers, chills or other respiratory complaints. Still works at [employer].  Pt was hospitalized from 2/15-2/19/24 for AECOPD with the following d/c summary: Hospital Course: Pt is a 88yFemale with a PMH of COPD (no home O2), HTN, HLD, and hypothyroidism who presented with SOB and cough. In the ED, pt was hypoxic to 88% on ambient air with increased work of breathing, so was placed on NC. Found to be influenza A positive, but CXR w/o concern for superimposed bacterial process. Pt was admitted for AECOPD 2/2 influenza A and started on steroids, BD, and Tamiflu. Steroids tapered to PO w/ improved oxygenation status. Pt no longer requiring supplemental O2 and ambulating the floor without dyspnea. To be sent home on additional 3 days of steroids for 7 days total. Pt was having uncontrolled HTN. Reviewed outpt records and pt was to be started on HCTZ-losartan combo pill, so will start on that med with close outpt follow up.   Pt near baseline though now with increased SOBOE. Ex-ox evaluation did not reveal hypoxia at rest or with exertion. [FreeTextEntry1] : \par   [Productive Cough] : no productive cough

## 2024-06-06 NOTE — VITALS
[TextEntry] : Ex-ox from 1/10/20:The patient was 97% on RA at rest and desaturated only to 94% on RA with exertion; she does not require home O2  Ex-ox from 10/14/21:The patient was 96% on RA at rest and desaturated to 92% on RA with exertion.  Ex-ox from 6/15/23: The patient was 95% on RA at rest and desaturated to 94% on RA with exertion but without increase in HR despite increased SOB. Ex-ox from 6/6/24: The patient was 96% on RA at rest and desaturated to 93% on RA with exertion but HR increased to 104.

## 2024-06-10 ENCOUNTER — APPOINTMENT (OUTPATIENT)
Dept: CARDIOLOGY | Facility: CLINIC | Age: 89
End: 2024-06-10
Payer: MEDICARE

## 2024-06-10 VITALS
HEART RATE: 80 BPM | WEIGHT: 144 LBS | OXYGEN SATURATION: 97 % | DIASTOLIC BLOOD PRESSURE: 68 MMHG | BODY MASS INDEX: 30.64 KG/M2 | SYSTOLIC BLOOD PRESSURE: 142 MMHG | HEIGHT: 57.5 IN

## 2024-06-10 DIAGNOSIS — E66.9 OBESITY, UNSPECIFIED: ICD-10-CM

## 2024-06-10 DIAGNOSIS — J44.9 CHRONIC OBSTRUCTIVE PULMONARY DISEASE, UNSPECIFIED: ICD-10-CM

## 2024-06-10 DIAGNOSIS — I50.32 CHRONIC DIASTOLIC (CONGESTIVE) HEART FAILURE: ICD-10-CM

## 2024-06-10 PROCEDURE — 93000 ELECTROCARDIOGRAM COMPLETE: CPT

## 2024-06-10 PROCEDURE — G2211 COMPLEX E/M VISIT ADD ON: CPT

## 2024-06-10 PROCEDURE — 99214 OFFICE O/P EST MOD 30 MIN: CPT

## 2024-06-10 RX ORDER — LOSARTAN POTASSIUM AND HYDROCHLOROTHIAZIDE 25; 100 MG/1; MG/1
100-25 TABLET ORAL DAILY
Qty: 90 | Refills: 3 | Status: ACTIVE | COMMUNITY
Start: 1900-01-01 | End: 1900-01-01

## 2024-06-23 NOTE — DISCUSSION/SUMMARY
[FreeTextEntry1] : Ms. CORRINE GARAY is a very pleasant 88 year woman with a past medical history of HTN, COPD, dyslipidemia presenting for evaluation of shortness of breath with exertion.   #acute on chronic diastolic HF - NYHA II-III symptoms - echo with G2DD and elevated LA size.  - Unable to start SGLT2 due to cost - continue losartan 50 mg daily - stopped lasix as it was not helping, remains euvolemic   #HTN: not controlled. Took meds today - INCREASE losartan to 100 mg daily - continue hctz 25 mg - continue amlodipine  #HLD: on zetia - checking lipids for next visit  Patient was advised to partake in 150 minutes of moderate exercise per week. Patient was advised to see us in 6 months if stable and certainly earlier with any new symptoms. Patient was advised to contact the office or seek medical care for any new or concerning symptoms right away.  Patient verbalized understanding and is in agreement with the above plan. [EKG obtained to assist in diagnosis and management of assessed problem(s)] : EKG obtained to assist in diagnosis and management of assessed problem(s)

## 2024-06-23 NOTE — HISTORY OF PRESENT ILLNESS
[FreeTextEntry1] : Ms. CORRINE GARAY is a very pleasant 88 year woman with a past medical history of HTN, COPD presenting for evaluation of dyspnea on exertion. She just saw Dr. Vidales for copd and was told resp status is stable. Despite this she cannot walk around the grocery store without having to lean on the cart given her SOB. She endoreses orthopnea and props herself up at night to sleep. No chest pain or lower extremity edema. Previous smoker. No cardiac history ortherwise.   BP elevated today, taking her bisoprolol/HCTZ combo pill daily.  ECG today shows signs of LVH with nonspecific t wave abnormality.   11/2023 Presents for fuv. Furosemide not making her pee. Feels short of breath still. Had an echocardiogram that showed normal LVEF but G2 DD and dilated LA consistent with elevated filling pressures.   No chest pain.   12/2023 Presents today for follow up. She could not get jardiance as it was too expensive. Feels well. BP remains elevated.  Otherwise, denies orthopnea, paroxysmal nocturnal dyspnea, lower extremity edema, unexplained weight gain or dyspnea on exertion.  5/2024 Presents today for fuv. Feels well overall and has no complaints. Shortness of breath improving. She is taking her inhalers as prescribed.  No chest pain ECG w/o ischemia BP well controlled  
normal...

## 2024-09-12 ENCOUNTER — APPOINTMENT (OUTPATIENT)
Dept: PULMONOLOGY | Facility: CLINIC | Age: 89
End: 2024-09-12
Payer: MEDICARE

## 2024-09-12 VITALS
OXYGEN SATURATION: 94 % | RESPIRATION RATE: 16 BRPM | SYSTOLIC BLOOD PRESSURE: 129 MMHG | HEIGHT: 57.5 IN | DIASTOLIC BLOOD PRESSURE: 67 MMHG | WEIGHT: 149 LBS | BODY MASS INDEX: 31.71 KG/M2 | HEART RATE: 77 BPM

## 2024-09-12 DIAGNOSIS — J44.9 CHRONIC OBSTRUCTIVE PULMONARY DISEASE, UNSPECIFIED: ICD-10-CM

## 2024-09-12 DIAGNOSIS — R06.02 SHORTNESS OF BREATH: ICD-10-CM

## 2024-09-12 DIAGNOSIS — G47.33 OBSTRUCTIVE SLEEP APNEA (ADULT) (PEDIATRIC): ICD-10-CM

## 2024-09-12 DIAGNOSIS — Z87.891 PERSONAL HISTORY OF NICOTINE DEPENDENCE: ICD-10-CM

## 2024-09-12 DIAGNOSIS — R05.9 COUGH, UNSPECIFIED: ICD-10-CM

## 2024-09-12 DIAGNOSIS — E66.9 OBESITY, UNSPECIFIED: ICD-10-CM

## 2024-09-12 PROCEDURE — 94010 BREATHING CAPACITY TEST: CPT

## 2024-09-12 PROCEDURE — 85018 HEMOGLOBIN: CPT | Mod: QW

## 2024-09-12 PROCEDURE — 99215 OFFICE O/P EST HI 40 MIN: CPT | Mod: 25

## 2024-09-12 RX ORDER — PREDNISONE 10 MG/1
10 TABLET ORAL
Qty: 100 | Refills: 0 | Status: ACTIVE | COMMUNITY
Start: 2024-09-12 | End: 1900-01-01

## 2024-09-12 RX ORDER — LEVOTHYROXINE SODIUM 137 UG/1
TABLET ORAL
Refills: 0 | Status: ACTIVE | COMMUNITY

## 2024-09-12 NOTE — CONSULT LETTER
[Dear  ___] : Dear  [unfilled], [Consult Letter:] : I had the pleasure of evaluating your patient, [unfilled]. [Please see my note below.] : Please see my note below. [Consult Closing:] : Thank you very much for allowing me to participate in the care of this patient.  If you have any questions, please do not hesitate to contact me. [Sincerely,] : Sincerely, [FreeTextEntry3] : Deshawn Vidales MD, FCCP, D. ABSM\par  Pulmonary and Sleep Medicine\par  [facility] at [place]\par

## 2024-09-12 NOTE — DISCUSSION/SUMMARY
[FreeTextEntry1] :   #1. The patient has evidence of Gold stage III COPD for which she is using Advair 250, Spiriva, and p.r.n. albuterol (she prefers Ventolin) but given increased SOBOE. She did not like Trelegy. #2. I have recommended diet and exercise for weight loss and to improve her exercise tolerance. #3. Diet and exercise for weight loss. #4. Will follow lung function periodically; Current spirometry from 9/12/24 was at baseline despite symptoms. #5. Oncology f/u for breast cancer as needed. #6. Nebulizer therapy for COPD/wheeze with Albuterol as needed; Rx new nebulizer in the past. #7. She does not appear to require home O2 as she did not desaturate at rest or on exertion previously or reportedly during her hospitalization. #8. Offered autoCPAP to treat mild LAYO with an AHI of 9.5 with significant desaturations in the past but pt refused and does not want to pursue further w/u of her LAYO. The patient understands the risks of untreated LAYO including heart disease, strokes, hypertension, pulmonary hypertension, and motor vehicle accidents as well as the need for treatment and weight loss; consider nocturnal oximetry if decides to pursue to ensure resolution of nocturnal hypoxia and did not want further w/u but now willing to consider. #9. S/p both covid vaccines and booster #10. F/u in 4-6 weeks with HST as she is now willing to consider therapy for LAYO as she is not sleeping well and with CXR to further evaluate SOBOE. #11. Prednisone taper as she reports improvement with recent taper. May need to continue low dose (5mg daily) given slight wheeze noted. #12. Consider cardiac w/u if no improvement with steroids or further evaluation with CTA for possible PE.  The patient expressed understanding and agreement with the above recommendations/plan and accepts responsibility to be compliant with recommended testing, therapies, and f/u visits. All relevant questions and concerns were addressed.

## 2024-09-12 NOTE — PHYSICAL EXAM
[No Acute Distress] : no acute distress [Well Nourished] : well nourished [Well Developed] : well developed [Normal Appearance] : normal appearance [Supple] : supple [Normal Rate/Rhythm] : normal rate/rhythm [Normal S1, S2] : normal s1, s2 [No Murmurs] : no murmurs [No Resp Distress] : no resp distress [No Acc Muscle Use] : no acc muscle use [Normal Rhythm and Effort] : normal rhythm and effort [Clear to Auscultation Bilaterally] : clear to auscultation bilaterally [No Abnormalities] : no abnormalities [Benign] : benign [Not Tender] : not tender [Soft] : soft [Normal Gait] : normal gait [No Clubbing] : no clubbing [No Cyanosis] : no cyanosis [No Edema] : no edema [No Focal Deficits] : no focal deficits [Oriented x3] : oriented x3 [Wheeze] : wheeze

## 2024-09-12 NOTE — CONSULT LETTER
[Dear  ___] : Dear  [unfilled], [Consult Letter:] : I had the pleasure of evaluating your patient, [unfilled]. [Please see my note below.] : Please see my note below. [Consult Closing:] : Thank you very much for allowing me to participate in the care of this patient.  If you have any questions, please do not hesitate to contact me. [Sincerely,] : Sincerely, [FreeTextEntry3] : Deshawn Vidales MD, FCCP, D. ABSM\par  Pulmonary and Sleep Medicine\par  [facility] at [place]\par

## 2024-09-12 NOTE — END OF VISIT
[Time Spent: ___ minutes] : I have spent [unfilled] minutes of time on the encounter which excludes teaching and separately reported services. [TextEntry] : Discussed with pt at length regarding COPD, obesity, SOBOE, evaluation for home O2 in the past; reviewed CXR and HST with pt.

## 2024-09-12 NOTE — HISTORY OF PRESENT ILLNESS
[Dyspnea at Rest] : dyspnea at rest [Short-Acting Beta Agonists] : short-acting beta agonists [Long-Acting Beta Agonists] : long-acting beta agonists [Anticholinergics (Inhalation)] : inhaled anticholinergics [Good Compliance] : good compliance with treatment [Good Tolerance] : good tolerance of treatment [Good Symptom Control] : good symptom control [Excess Weight] : excess weight [Inability to Lose Weight] : inability to lose weight [Low Calorie Diet] : low calorie diet [Poor Compliance] : poor compliance with treatment [Poor Tolerance] : poor tolerance of treatment [Poor Symptom Control] : poor symptom control [Hypertension] : hypertension [High] : high [Low Calorie] : low calorie [Well Balanced Diet] : well balanced meals [Sedentary] : is sedentary [Follow-Up - Routine Clinic] : a routine clinic follow-up of [Excessive Daytime Sleepiness] : excessive daytime sleepiness [Snoring] : snoring [Unrefreshing Sleep] : unrefreshing sleep [Sleepy When Sedentary] : sleepy when sedentary [Currently Experiencing] : The patient is currently experiencing symptoms. [None] : The patient is not currently being treated for this problem [TextBox_4] : Former smoker of up to 2.5 ppd x 30 years, quit 1985. Patient reports increased SOBOE especially by mid-day but otherwise without cough, fevers, chills or other respiratory complaints. Still works at [employer].  Pt was hospitalized from 2/15-2/19/24 for AECOPD with the following d/c summary: Hospital Course: Pt is a 88yFemale with a PMH of COPD (no home O2), HTN, HLD, and hypothyroidism who presented with SOB and cough. In the ED, pt was hypoxic to 88% on ambient air with increased work of breathing, so was placed on NC. Found to be influenza A positive, but CXR w/o concern for superimposed bacterial process. Pt was admitted for AECOPD 2/2 influenza A and started on steroids, BD, and Tamiflu. Steroids tapered to PO w/ improved oxygenation status. Pt no longer requiring supplemental O2 and ambulating the floor without dyspnea. To be sent home on additional 3 days of steroids for 7 days total. Pt was having uncontrolled HTN. Reviewed outpt records and pt was to be started on HCTZ-losartan combo pill, so will start on that med with close outpt follow up.   Pt near baseline though now with increased SOBOE. Ex-ox evaluation did not reveal hypoxia at rest or with exertion in the past. Pt reports some improvement with prednisone. [FreeTextEntry1] : \par   [Productive Cough] : no productive cough

## 2024-09-12 NOTE — HISTORY OF PRESENT ILLNESS
[Dyspnea at Rest] : dyspnea at rest [Short-Acting Beta Agonists] : short-acting beta agonists [Long-Acting Beta Agonists] : long-acting beta agonists [Anticholinergics (Inhalation)] : inhaled anticholinergics [Good Compliance] : good compliance with treatment [Good Tolerance] : good tolerance of treatment [Good Symptom Control] : good symptom control [Excess Weight] : excess weight [Inability to Lose Weight] : inability to lose weight [Low Calorie Diet] : low calorie diet [Poor Compliance] : poor compliance with treatment [Poor Tolerance] : poor tolerance of treatment [Poor Symptom Control] : poor symptom control [Hypertension] : hypertension [High] : high [Low Calorie] : low calorie [Well Balanced Diet] : well balanced meals [Sedentary] : is sedentary [Follow-Up - Routine Clinic] : a routine clinic follow-up of [Excessive Daytime Sleepiness] : excessive daytime sleepiness [Snoring] : snoring [Unrefreshing Sleep] : unrefreshing sleep [Sleepy When Sedentary] : sleepy when sedentary [Currently Experiencing] : The patient is currently experiencing symptoms. [None] : The patient is not currently being treated for this problem [TextBox_4] : Former smoker of up to 2.5 ppd x 30 years, quit 1985. Patient reports increased SOBOE especially by mid-day but otherwise without cough, fevers, chills or other respiratory complaints. Still works at [employer].  Pt was hospitalized from 2/15-2/19/24 for AECOPD with the following d/c summary: Hospital Course: Pt is a 88yFemale with a PMH of COPD (no home O2), HTN, HLD, and hypothyroidism who presented with SOB and cough. In the ED, pt was hypoxic to 88% on ambient air with increased work of breathing, so was placed on NC. Found to be influenza A positive, but CXR w/o concern for superimposed bacterial process. Pt was admitted for AECOPD 2/2 influenza A and started on steroids, BD, and Tamiflu. Steroids tapered to PO w/ improved oxygenation status. Pt no longer requiring supplemental O2 and ambulating the floor without dyspnea. To be sent home on additional 3 days of steroids for 7 days total. Pt was having uncontrolled HTN. Reviewed outpt records and pt was to be started on HCTZ-losartan combo pill, so will start on that med with close outpt follow up.   Pt near baseline though now with increased SOBOE. Ex-ox evaluation did not reveal hypoxia at rest or with exertion in the past. Pt reports some improvement with prednisone. [FreeTextEntry1] : \par   [Productive Cough] : no productive cough

## 2024-09-12 NOTE — PROCEDURE
[FreeTextEntry1] : PFTs performed previously revealed moderate COPD and was unchanged when c/w her previous. Lung volumes are normal and could not assess DLCO. Spirometry subsequently was c/w mild to moderate COPD and was without significant change when c/w her previous study PFTs 10/3/18 - Moderately reduced FVC and FEV1 with an obstructive pattern and near normal to mildly reduced lung volumes; unable to assess DLCO; overall lung function is slightly reduced than her previous but near baseline. Spirometry 5/17/19 - Moderate COPD and slightly worse than previous. Spirometry 8/22/19 - Moderate COPD without significant change PFTs 1/10/20 - Moderate COPD slightly worse than previous but with essentially normal lung volumes but diffusion capacity could not be determined PFTs 4/13/21 - Moderate COPD with normal lung volumes but could not obtain DLCO due to low volumes; essentially at baseline with slow deterioration over the past 6 years PFTs 9/7/23 - Mildly reduced FVC with moderately reduced FEV1 with obstruction with mildly reduced lung volumes but normal TLC; could not obtain DLCO. PFTs 9/12/24 - Mildly reduced FVC and moderately reduced FEV1 with obstruction but lung volumes could not be obtained and DLCO was moderately reduced; overall near baseline.

## 2024-09-23 ENCOUNTER — OUTPATIENT (OUTPATIENT)
Dept: OUTPATIENT SERVICES | Facility: HOSPITAL | Age: 89
LOS: 1 days | End: 2024-09-23
Payer: MEDICARE

## 2024-09-23 DIAGNOSIS — G47.33 OBSTRUCTIVE SLEEP APNEA (ADULT) (PEDIATRIC): ICD-10-CM

## 2024-09-23 PROCEDURE — 95800 SLP STDY UNATTENDED: CPT

## 2024-09-23 PROCEDURE — 95800 SLP STDY UNATTENDED: CPT | Mod: 26

## 2024-10-10 ENCOUNTER — APPOINTMENT (OUTPATIENT)
Dept: PULMONOLOGY | Facility: CLINIC | Age: 89
End: 2024-10-10
Payer: MEDICARE

## 2024-10-10 VITALS
OXYGEN SATURATION: 97 % | HEIGHT: 57.5 IN | WEIGHT: 147 LBS | SYSTOLIC BLOOD PRESSURE: 130 MMHG | RESPIRATION RATE: 16 BRPM | BODY MASS INDEX: 31.28 KG/M2 | DIASTOLIC BLOOD PRESSURE: 60 MMHG | HEART RATE: 78 BPM

## 2024-10-10 DIAGNOSIS — Z87.891 PERSONAL HISTORY OF NICOTINE DEPENDENCE: ICD-10-CM

## 2024-10-10 DIAGNOSIS — Z86.69 PERSONAL HISTORY OF OTHER DISEASES OF THE NERVOUS SYSTEM AND SENSE ORGANS: ICD-10-CM

## 2024-10-10 DIAGNOSIS — E66.811 OBESITY, CLASS 1: ICD-10-CM

## 2024-10-10 DIAGNOSIS — R06.02 SHORTNESS OF BREATH: ICD-10-CM

## 2024-10-10 DIAGNOSIS — J44.9 CHRONIC OBSTRUCTIVE PULMONARY DISEASE, UNSPECIFIED: ICD-10-CM

## 2024-10-10 DIAGNOSIS — R05.9 COUGH, UNSPECIFIED: ICD-10-CM

## 2024-10-10 PROCEDURE — 99214 OFFICE O/P EST MOD 30 MIN: CPT

## 2024-10-10 PROCEDURE — G2211 COMPLEX E/M VISIT ADD ON: CPT

## 2024-12-05 ENCOUNTER — APPOINTMENT (OUTPATIENT)
Dept: CARDIOLOGY | Facility: CLINIC | Age: 88
End: 2024-12-05

## 2024-12-16 ENCOUNTER — NON-APPOINTMENT (OUTPATIENT)
Age: 88
End: 2024-12-16

## 2024-12-16 ENCOUNTER — APPOINTMENT (OUTPATIENT)
Dept: CARDIOLOGY | Facility: CLINIC | Age: 88
End: 2024-12-16
Payer: MEDICARE

## 2024-12-16 VITALS
HEART RATE: 94 BPM | OXYGEN SATURATION: 95 % | WEIGHT: 144 LBS | SYSTOLIC BLOOD PRESSURE: 147 MMHG | BODY MASS INDEX: 31.07 KG/M2 | HEIGHT: 57 IN | DIASTOLIC BLOOD PRESSURE: 89 MMHG

## 2024-12-16 VITALS — DIASTOLIC BLOOD PRESSURE: 70 MMHG | SYSTOLIC BLOOD PRESSURE: 134 MMHG

## 2024-12-16 DIAGNOSIS — I50.32 CHRONIC DIASTOLIC (CONGESTIVE) HEART FAILURE: ICD-10-CM

## 2024-12-16 DIAGNOSIS — R06.02 SHORTNESS OF BREATH: ICD-10-CM

## 2024-12-16 PROCEDURE — 93000 ELECTROCARDIOGRAM COMPLETE: CPT

## 2024-12-16 PROCEDURE — 99214 OFFICE O/P EST MOD 30 MIN: CPT | Mod: 25

## 2024-12-27 ENCOUNTER — RX RENEWAL (OUTPATIENT)
Age: 88
End: 2024-12-27

## 2024-12-30 ENCOUNTER — RX RENEWAL (OUTPATIENT)
Age: 88
End: 2024-12-30

## 2025-01-09 ENCOUNTER — APPOINTMENT (OUTPATIENT)
Dept: PULMONOLOGY | Facility: CLINIC | Age: 89
End: 2025-01-09
Payer: MEDICARE

## 2025-01-09 VITALS
SYSTOLIC BLOOD PRESSURE: 146 MMHG | RESPIRATION RATE: 16 BRPM | HEIGHT: 57 IN | BODY MASS INDEX: 30.63 KG/M2 | DIASTOLIC BLOOD PRESSURE: 56 MMHG | OXYGEN SATURATION: 91 % | WEIGHT: 142 LBS | HEART RATE: 95 BPM

## 2025-01-09 DIAGNOSIS — J44.9 CHRONIC OBSTRUCTIVE PULMONARY DISEASE, UNSPECIFIED: ICD-10-CM

## 2025-01-09 DIAGNOSIS — R06.02 SHORTNESS OF BREATH: ICD-10-CM

## 2025-01-09 DIAGNOSIS — E66.811 OBESITY, CLASS 1: ICD-10-CM

## 2025-01-09 DIAGNOSIS — R05.9 COUGH, UNSPECIFIED: ICD-10-CM

## 2025-01-09 DIAGNOSIS — Z87.891 PERSONAL HISTORY OF NICOTINE DEPENDENCE: ICD-10-CM

## 2025-01-09 PROCEDURE — 99215 OFFICE O/P EST HI 40 MIN: CPT

## 2025-01-09 PROCEDURE — G2211 COMPLEX E/M VISIT ADD ON: CPT

## 2025-01-09 RX ORDER — PREDNISONE 10 MG/1
10 TABLET ORAL DAILY
Qty: 30 | Refills: 2 | Status: ACTIVE | COMMUNITY
Start: 2025-01-09 | End: 1900-01-01

## 2025-04-08 ENCOUNTER — INPATIENT (INPATIENT)
Facility: HOSPITAL | Age: 89
LOS: 5 days | Discharge: HOME | DRG: 310 | End: 2025-04-14
Attending: STUDENT IN AN ORGANIZED HEALTH CARE EDUCATION/TRAINING PROGRAM | Admitting: HOSPITALIST
Payer: MEDICARE

## 2025-04-08 ENCOUNTER — RESULT REVIEW (OUTPATIENT)
Age: 89
End: 2025-04-08

## 2025-04-08 VITALS
RESPIRATION RATE: 18 BRPM | OXYGEN SATURATION: 97 % | TEMPERATURE: 97 F | DIASTOLIC BLOOD PRESSURE: 75 MMHG | HEART RATE: 154 BPM | SYSTOLIC BLOOD PRESSURE: 156 MMHG

## 2025-04-08 DIAGNOSIS — I49.01 VENTRICULAR FIBRILLATION: ICD-10-CM

## 2025-04-08 DIAGNOSIS — I25.10 ATHEROSCLEROTIC HEART DISEASE OF NATIVE CORONARY ARTERY WITHOUT ANGINA PECTORIS: ICD-10-CM

## 2025-04-08 LAB
ALBUMIN SERPL ELPH-MCNC: 3.9 G/DL — SIGNIFICANT CHANGE UP (ref 3.3–5.2)
ALP SERPL-CCNC: 55 U/L — SIGNIFICANT CHANGE UP (ref 40–120)
ALT FLD-CCNC: 14 U/L — SIGNIFICANT CHANGE UP
ANION GAP SERPL CALC-SCNC: 15 MMOL/L — SIGNIFICANT CHANGE UP (ref 5–17)
APTT BLD: 26.1 SEC — SIGNIFICANT CHANGE UP (ref 24.5–35.6)
AST SERPL-CCNC: 21 U/L — SIGNIFICANT CHANGE UP
BASOPHILS # BLD AUTO: 0.08 K/UL — SIGNIFICANT CHANGE UP (ref 0–0.2)
BASOPHILS # BLD MANUAL: 0 K/UL — SIGNIFICANT CHANGE UP (ref 0–0.2)
BASOPHILS NFR BLD AUTO: 0.5 % — SIGNIFICANT CHANGE UP (ref 0–2)
BASOPHILS NFR BLD MANUAL: 0 % — SIGNIFICANT CHANGE UP (ref 0–2)
BILIRUB SERPL-MCNC: 0.4 MG/DL — SIGNIFICANT CHANGE UP (ref 0.4–2)
BUN SERPL-MCNC: 32.4 MG/DL — HIGH (ref 8–20)
CALCIUM SERPL-MCNC: 9.5 MG/DL — SIGNIFICANT CHANGE UP (ref 8.4–10.5)
CHLORIDE SERPL-SCNC: 102 MMOL/L — SIGNIFICANT CHANGE UP (ref 96–108)
CO2 SERPL-SCNC: 25 MMOL/L — SIGNIFICANT CHANGE UP (ref 22–29)
CREAT SERPL-MCNC: 1.21 MG/DL — SIGNIFICANT CHANGE UP (ref 0.5–1.3)
EGFR: 43 ML/MIN/1.73M2 — LOW
EGFR: 43 ML/MIN/1.73M2 — LOW
EOSINOPHIL # BLD AUTO: 0.36 K/UL — SIGNIFICANT CHANGE UP (ref 0–0.5)
EOSINOPHIL # BLD MANUAL: 0.69 K/UL — HIGH (ref 0–0.5)
EOSINOPHIL NFR BLD AUTO: 2.2 % — SIGNIFICANT CHANGE UP (ref 0–6)
EOSINOPHIL NFR BLD MANUAL: 4.3 % — SIGNIFICANT CHANGE UP (ref 0–6)
GIANT PLATELETS BLD QL SMEAR: PRESENT
GLUCOSE SERPL-MCNC: 135 MG/DL — HIGH (ref 70–99)
HCT VFR BLD CALC: 42.2 % — SIGNIFICANT CHANGE UP (ref 34.5–45)
HGB BLD-MCNC: 13 G/DL — SIGNIFICANT CHANGE UP (ref 11.5–15.5)
IMM GRANULOCYTES # BLD AUTO: 0.16 K/UL — HIGH (ref 0–0.07)
IMM GRANULOCYTES NFR BLD AUTO: 1 % — HIGH (ref 0–0.9)
INR BLD: 1.03 RATIO — SIGNIFICANT CHANGE UP (ref 0.85–1.16)
LYMPHOCYTES # BLD AUTO: 2.72 K/UL — SIGNIFICANT CHANGE UP (ref 1–3.3)
LYMPHOCYTES # BLD MANUAL: 3.01 K/UL — SIGNIFICANT CHANGE UP (ref 1–3.3)
LYMPHOCYTES NFR BLD AUTO: 17 % — SIGNIFICANT CHANGE UP (ref 13–44)
LYMPHOCYTES NFR BLD MANUAL: 18.8 % — SIGNIFICANT CHANGE UP (ref 13–44)
MAGNESIUM SERPL-MCNC: 1.8 MG/DL — SIGNIFICANT CHANGE UP (ref 1.8–2.6)
MANUAL METAMYELOCYTE #: 0.14 K/UL — HIGH (ref 0–0)
MANUAL NEUTROPHIL BANDS #: 0.14 K/UL — SIGNIFICANT CHANGE UP (ref 0–0.84)
MANUAL REACTIVE LYMPHOCYTES #: 0.82 K/UL — HIGH (ref 0–0.63)
MCHC RBC-ENTMCNC: 30.8 G/DL — LOW (ref 32–36)
MCHC RBC-ENTMCNC: 31.3 PG — SIGNIFICANT CHANGE UP (ref 27–34)
MCV RBC AUTO: 101.7 FL — HIGH (ref 80–100)
METAMYELOCYTES # FLD: 0.9 % — HIGH (ref 0–0)
METAMYELOCYTES NFR BLD: 0.9 % — HIGH (ref 0–0)
MONOCYTES # BLD AUTO: 1.66 K/UL — HIGH (ref 0–0.9)
MONOCYTES # BLD MANUAL: 0.96 K/UL — HIGH (ref 0–0.9)
MONOCYTES NFR BLD AUTO: 10.4 % — SIGNIFICANT CHANGE UP (ref 2–14)
MONOCYTES NFR BLD MANUAL: 6 % — SIGNIFICANT CHANGE UP (ref 2–14)
NEUTROPHILS # BLD AUTO: 11.03 K/UL — HIGH (ref 1.8–7.4)
NEUTROPHILS # BLD MANUAL: 10.25 K/UL — HIGH (ref 1.8–7.4)
NEUTROPHILS NFR BLD AUTO: 68.9 % — SIGNIFICANT CHANGE UP (ref 43–77)
NEUTROPHILS NFR BLD MANUAL: 64 % — SIGNIFICANT CHANGE UP (ref 43–77)
NEUTS BAND # BLD: 0.9 % — SIGNIFICANT CHANGE UP (ref 0–8)
NEUTS BAND NFR BLD: 0.9 % — SIGNIFICANT CHANGE UP (ref 0–8)
NRBC # BLD AUTO: 0 K/UL — SIGNIFICANT CHANGE UP (ref 0–0)
NRBC # FLD: 0 K/UL — SIGNIFICANT CHANGE UP (ref 0–0)
NRBC BLD AUTO-RTO: 0 /100 WBCS — SIGNIFICANT CHANGE UP (ref 0–0)
NT-PROBNP SERPL-SCNC: 1562 PG/ML — HIGH (ref 0–300)
PLAT MORPH BLD: NORMAL — SIGNIFICANT CHANGE UP
PLATELET # BLD AUTO: 197 K/UL — SIGNIFICANT CHANGE UP (ref 150–400)
PMV BLD: 11.8 FL — SIGNIFICANT CHANGE UP (ref 7–13)
POTASSIUM SERPL-MCNC: 4.1 MMOL/L — SIGNIFICANT CHANGE UP (ref 3.5–5.3)
POTASSIUM SERPL-SCNC: 4.1 MMOL/L — SIGNIFICANT CHANGE UP (ref 3.5–5.3)
PROT SERPL-MCNC: 6.4 G/DL — LOW (ref 6.6–8.7)
PROTHROM AB SERPL-ACNC: 11.6 SEC — SIGNIFICANT CHANGE UP (ref 9.9–13.4)
RBC # BLD: 4.15 M/UL — SIGNIFICANT CHANGE UP (ref 3.8–5.2)
RBC # FLD: 12.9 % — SIGNIFICANT CHANGE UP (ref 10.3–14.5)
RBC BLD AUTO: NORMAL — SIGNIFICANT CHANGE UP
SODIUM SERPL-SCNC: 142 MMOL/L — SIGNIFICANT CHANGE UP (ref 135–145)
TROPONIN T, HIGH SENSITIVITY RESULT: 64 NG/L — HIGH (ref 0–51)
VARIANT LYMPHS # BLD: 5.1 % — SIGNIFICANT CHANGE UP (ref 0–6)
VARIANT LYMPHS NFR BLD MANUAL: 5.1 % — SIGNIFICANT CHANGE UP (ref 0–6)
WBC # BLD: 16.01 K/UL — HIGH (ref 3.8–10.5)
WBC # FLD AUTO: 16.01 K/UL — HIGH (ref 3.8–10.5)

## 2025-04-08 PROCEDURE — 92978 ENDOLUMINL IVUS OCT C 1ST: CPT | Mod: 26,RI

## 2025-04-08 PROCEDURE — 93306 TTE W/DOPPLER COMPLETE: CPT | Mod: 26

## 2025-04-08 PROCEDURE — 99223 1ST HOSP IP/OBS HIGH 75: CPT

## 2025-04-08 PROCEDURE — 93010 ELECTROCARDIOGRAM REPORT: CPT

## 2025-04-08 PROCEDURE — 92928 PRQ TCAT PLMT NTRAC ST 1 LES: CPT | Mod: RI

## 2025-04-08 PROCEDURE — 99291 CRITICAL CARE FIRST HOUR: CPT

## 2025-04-08 PROCEDURE — 99152 MOD SED SAME PHYS/QHP 5/>YRS: CPT

## 2025-04-08 PROCEDURE — 93458 L HRT ARTERY/VENTRICLE ANGIO: CPT | Mod: 26,59

## 2025-04-08 PROCEDURE — 99233 SBSQ HOSP IP/OBS HIGH 50: CPT | Mod: 25

## 2025-04-08 PROCEDURE — 99223 1ST HOSP IP/OBS HIGH 75: CPT | Mod: 25

## 2025-04-08 PROCEDURE — 71045 X-RAY EXAM CHEST 1 VIEW: CPT | Mod: 26

## 2025-04-08 RX ORDER — AMIODARONE HYDROCHLORIDE 50 MG/ML
1 INJECTION, SOLUTION INTRAVENOUS
Qty: 450 | Refills: 0 | Status: DISCONTINUED | OUTPATIENT
Start: 2025-04-08 | End: 2025-04-08

## 2025-04-08 RX ORDER — LEVOTHYROXINE SODIUM 300 MCG
125 TABLET ORAL DAILY
Refills: 0 | Status: DISCONTINUED | OUTPATIENT
Start: 2025-04-08 | End: 2025-04-14

## 2025-04-08 RX ORDER — LEVOTHYROXINE SODIUM 300 MCG
1 TABLET ORAL
Refills: 0 | DISCHARGE

## 2025-04-08 RX ORDER — ATORVASTATIN CALCIUM 80 MG/1
80 TABLET, FILM COATED ORAL AT BEDTIME
Refills: 0 | Status: DISCONTINUED | OUTPATIENT
Start: 2025-04-08 | End: 2025-04-14

## 2025-04-08 RX ORDER — AMIODARONE HYDROCHLORIDE 50 MG/ML
150 INJECTION, SOLUTION INTRAVENOUS ONCE
Refills: 0 | Status: COMPLETED | OUTPATIENT
Start: 2025-04-08 | End: 2025-04-08

## 2025-04-08 RX ORDER — HEPARIN SODIUM 1000 [USP'U]/ML
INJECTION INTRAVENOUS; SUBCUTANEOUS
Qty: 25000 | Refills: 0 | Status: DISCONTINUED | OUTPATIENT
Start: 2025-04-08 | End: 2025-04-08

## 2025-04-08 RX ORDER — CLOPIDOGREL BISULFATE 75 MG/1
75 TABLET, FILM COATED ORAL DAILY
Refills: 0 | Status: DISCONTINUED | OUTPATIENT
Start: 2025-04-09 | End: 2025-04-14

## 2025-04-08 RX ORDER — MAGNESIUM SULFATE 500 MG/ML
2 SYRINGE (ML) INJECTION ONCE
Refills: 0 | Status: COMPLETED | OUTPATIENT
Start: 2025-04-08 | End: 2025-04-08

## 2025-04-08 RX ORDER — ASPIRIN 325 MG
81 TABLET ORAL DAILY
Refills: 0 | Status: DISCONTINUED | OUTPATIENT
Start: 2025-04-09 | End: 2025-04-14

## 2025-04-08 RX ORDER — ASPIRIN 325 MG
324 TABLET ORAL ONCE
Refills: 0 | Status: COMPLETED | OUTPATIENT
Start: 2025-04-08 | End: 2025-04-08

## 2025-04-08 RX ORDER — AMLODIPINE BESYLATE 10 MG/1
1 TABLET ORAL
Refills: 0 | DISCHARGE

## 2025-04-08 RX ORDER — LOSARTAN POTASSIUM 100 MG/1
50 TABLET, FILM COATED ORAL DAILY
Refills: 0 | Status: DISCONTINUED | OUTPATIENT
Start: 2025-04-08 | End: 2025-04-11

## 2025-04-08 RX ORDER — TIOTROPIUM BROMIDE INHALATION SPRAY 3.12 UG/1
2 SPRAY, METERED RESPIRATORY (INHALATION) DAILY
Refills: 0 | Status: DISCONTINUED | OUTPATIENT
Start: 2025-04-08 | End: 2025-04-14

## 2025-04-08 RX ORDER — AMLODIPINE BESYLATE 10 MG/1
5 TABLET ORAL DAILY
Refills: 0 | Status: DISCONTINUED | OUTPATIENT
Start: 2025-04-08 | End: 2025-04-10

## 2025-04-08 RX ORDER — HEPARIN SODIUM 1000 [USP'U]/ML
3800 INJECTION INTRAVENOUS; SUBCUTANEOUS EVERY 6 HOURS
Refills: 0 | Status: DISCONTINUED | OUTPATIENT
Start: 2025-04-08 | End: 2025-04-08

## 2025-04-08 RX ORDER — APIXABAN 2.5 MG/1
5 TABLET, FILM COATED ORAL
Refills: 0 | Status: DISCONTINUED | OUTPATIENT
Start: 2025-04-09 | End: 2025-04-14

## 2025-04-08 RX ORDER — INFLUENZA A VIRUS A/IDAHO/07/2018 (H1N1) ANTIGEN (MDCK CELL DERIVED, PROPIOLACTONE INACTIVATED, INFLUENZA A VIRUS A/INDIANA/08/2018 (H3N2) ANTIGEN (MDCK CELL DERIVED, PROPIOLACTONE INACTIVATED), INFLUENZA B VIRUS B/SINGAPORE/INFTT-16-0610/2016 ANTIGEN (MDCK CELL DERIVED, PROPIOLACTONE INACTIVATED), INFLUENZA B VIRUS B/IOWA/06/2017 ANTIGEN (MDCK CELL DERIVED, PROPIOLACTONE INACTIVATED) 15; 15; 15; 15 UG/.5ML; UG/.5ML; UG/.5ML; UG/.5ML
0.5 INJECTION, SUSPENSION INTRAMUSCULAR ONCE
Refills: 0 | Status: DISCONTINUED | OUTPATIENT
Start: 2025-04-08 | End: 2025-04-14

## 2025-04-08 RX ORDER — AMIODARONE HYDROCHLORIDE 50 MG/ML
0.5 INJECTION, SOLUTION INTRAVENOUS
Qty: 450 | Refills: 0 | Status: DISCONTINUED | OUTPATIENT
Start: 2025-04-08 | End: 2025-04-09

## 2025-04-08 RX ORDER — HEPARIN SODIUM 1000 [USP'U]/ML
2000 INJECTION INTRAVENOUS; SUBCUTANEOUS ONCE
Refills: 0 | Status: COMPLETED | OUTPATIENT
Start: 2025-04-08 | End: 2025-04-08

## 2025-04-08 RX ORDER — NITROGLYCERIN 20 MG/G
0.25 OINTMENT TOPICAL ONCE
Refills: 0 | Status: COMPLETED | OUTPATIENT
Start: 2025-04-08 | End: 2025-04-08

## 2025-04-08 RX ADMIN — HEPARIN SODIUM 750 UNIT(S)/HR: 1000 INJECTION INTRAVENOUS; SUBCUTANEOUS at 14:29

## 2025-04-08 RX ADMIN — HEPARIN SODIUM 3800 UNIT(S): 1000 INJECTION INTRAVENOUS; SUBCUTANEOUS at 14:25

## 2025-04-08 RX ADMIN — LOSARTAN POTASSIUM 50 MILLIGRAM(S): 100 TABLET, FILM COATED ORAL at 20:50

## 2025-04-08 RX ADMIN — Medication 324 MILLIGRAM(S): at 14:28

## 2025-04-08 RX ADMIN — Medication 150 GRAM(S): at 15:01

## 2025-04-08 RX ADMIN — Medication 100 MILLILITER(S): at 16:00

## 2025-04-08 RX ADMIN — ATORVASTATIN CALCIUM 80 MILLIGRAM(S): 80 TABLET, FILM COATED ORAL at 21:45

## 2025-04-08 RX ADMIN — Medication 150 GRAM(S): at 13:12

## 2025-04-08 RX ADMIN — AMIODARONE HYDROCHLORIDE 16.7 MG/MIN: 50 INJECTION, SOLUTION INTRAVENOUS at 22:55

## 2025-04-08 RX ADMIN — AMIODARONE HYDROCHLORIDE 618 MILLIGRAM(S): 50 INJECTION, SOLUTION INTRAVENOUS at 14:25

## 2025-04-08 NOTE — ED PROVIDER NOTE - CLINICAL SUMMARY MEDICAL DECISION MAKING FREE TEXT BOX
89 y/o F pt w/ PMHx of HTN, HLD, hypothyroid presenting w/ chest pain. found to be in irregular tachycardia, afib w/ abberancy versus intermittent ventricular beats. given lopressor 5mg IV x1 without response. 15 min following pt developed TdP into vfib. Unable to obtain pulse. pt was defibrillated w/ 200 joules immediately and about 15 seconds of cpr before patient began exhibiting purposeful movement. repeat ekg showing sinus rhythm. VSS. magnesium IV given. EP consulted.

## 2025-04-08 NOTE — ED PROVIDER NOTE - CARE PLAN
1 Principal Discharge DX:	VF (ventricular fibrillation)  Secondary Diagnosis:	Atrial premature depolarization with aberrant ventricular conduction

## 2025-04-08 NOTE — DISCHARGE NOTE PROVIDER - CARE PROVIDERS DIRECT ADDRESSES
[email or web address]

## 2025-04-08 NOTE — PATIENT PROFILE ADULT - FUNCTIONAL ASSESSMENT - BASIC MOBILITY 6.
[facility] Division of Hospital Medicine Progress Note    Lenny Velazquez M.D.    Patient is a 64y old  Female who presents with a chief complaint of Acute encephalopathy (05 Mar 2022 12:26)      SUBJECTIVE / OVERNIGHT EVENTS: still with some left arm pain, about 6/10. Reports RLL much better, no abd pain.     Patient denies chest pain, SOB, abd pain, N/V, fever, chills, dysuria or any other complaints. All remainder ROS negative.     MEDICATIONS  (STANDING):  dextrose 40% Gel 15 Gram(s) Oral once  dextrose 5%. 1000 milliLiter(s) (100 mL/Hr) IV Continuous <Continuous>  dextrose 5%. 1000 milliLiter(s) (50 mL/Hr) IV Continuous <Continuous>  dextrose 50% Injectable 25 Gram(s) IV Push once  dextrose 50% Injectable 12.5 Gram(s) IV Push once  dextrose 50% Injectable 25 Gram(s) IV Push once  ferrous    sulfate 325 milliGRAM(s) Oral daily  folic acid 1 milliGRAM(s) Oral daily  furosemide    Tablet 20 milliGRAM(s) Oral daily  glucagon  Injectable 1 milliGRAM(s) IntraMuscular once  insulin glargine Injectable (LANTUS) 20 Unit(s) SubCutaneous at bedtime  insulin lispro (ADMELOG) corrective regimen sliding scale   SubCutaneous at bedtime  insulin lispro (ADMELOG) corrective regimen sliding scale   SubCutaneous three times a day before meals  labetalol 50 milliGRAM(s) Oral two times a day  lactulose Syrup 10 Gram(s) Oral three times a day  levothyroxine 50 MICROGram(s) Oral daily  meropenem  IVPB 1000 milliGRAM(s) IV Intermittent every 8 hours  multivitamin 1 Tablet(s) Oral daily  pantoprazole    Tablet 40 milliGRAM(s) Oral two times a day  rifAXIMin 550 milliGRAM(s) Oral two times a day  sodium chloride 2% . 1000 milliLiter(s) (65 mL/Hr) IV Continuous <Continuous>  spironolactone 50 milliGRAM(s) Oral daily  thiamine 100 milliGRAM(s) Oral daily  traMADol 25 milliGRAM(s) Oral two times a day    MEDICATIONS  (PRN):  aluminum hydroxide/magnesium hydroxide/simethicone Suspension 30 milliLiter(s) Oral every 4 hours PRN Dyspepsia  bisacodyl Suppository 10 milliGRAM(s) Rectal daily PRN Constipation  HYDROmorphone  Injectable 0.5 milliGRAM(s) IV Push four times a day PRN Severe Pain (7 - 10)  ketorolac   Injectable 15 milliGRAM(s) IV Push four times a day PRN Mild Pain (1 - 3) and moderate pain (4-6)  morphine  - Injectable 2 milliGRAM(s) IV Push three times a day PRN Severe Pain (7 - 10)  ondansetron Injectable 4 milliGRAM(s) IV Push every 8 hours PRN Nausea and/or Vomiting      I&O's Summary    05 Mar 2022 07:01  -  06 Mar 2022 07:00  --------------------------------------------------------  IN: 50 mL / OUT: 0 mL / NET: 50 mL        PHYSICAL EXAM:  Vital Signs Last 24 Hrs  T(C): 37.2 (06 Mar 2022 07:50), Max: 37.4 (06 Mar 2022 04:29)  T(F): 99 (06 Mar 2022 07:50), Max: 99.3 (06 Mar 2022 04:29)  HR: 71 (06 Mar 2022 07:50) (68 - 75)  BP: 133/76 (06 Mar 2022 07:50) (106/64 - 153/79)  BP(mean): --  RR: 19 (06 Mar 2022 07:50) (18 - 19)  SpO2: 99% (06 Mar 2022 07:50) (98% - 99%)    CONSTITUTIONAL: NAD, well-groomed  ENMT: Moist oral mucosa, no pharyngeal injection or exudates; +severe sclera icterus  RESPIRATORY: Normal respiratory effort; lungs are clear to auscultation bilaterally  CARDIOVASCULAR: Regular rate and rhythm, normal S1 and S2, no murmur/rub/gallop; No lower extremity edema;  ABDOMEN: Nontender to palpation, normoactive bowel sounds  PSYCH: A+O x3; affect appropriate  NEUROLOGY: CN 2-12 are intact and symmetric; no gross sensory deficits;   SKIN: No rashes; no palpable lesions. Left cubital ecchymosis and TTP    LABS:                        7.5    7.48  )-----------( 139      ( 06 Mar 2022 07:39 )             21.4     03-06    129<L>  |  98  |  24.4<H>  ----------------------------<  152<H>  5.2   |  20.0<L>  |  0.73    Ca    8.8      06 Mar 2022 07:39    TPro  6.2<L>  /  Alb  2.9<L>  /  TBili  5.5<H>  /  DBili  x   /  AST  99<H>  /  ALT  31  /  AlkPhos  255<H>  03-06    PT/INR - ( 05 Mar 2022 08:45 )   PT: 20.5 sec;   INR: 1.76 ratio                   CAPILLARY BLOOD GLUCOSE      POCT Blood Glucose.: 191 mg/dL (06 Mar 2022 08:26)  POCT Blood Glucose.: 182 mg/dL (05 Mar 2022 21:39)  POCT Blood Glucose.: 202 mg/dL (05 Mar 2022 18:04)  POCT Blood Glucose.: 187 mg/dL (05 Mar 2022 12:42)      RADIOLOGY & ADDITIONAL TESTS:  Results Reviewed:   Imaging Personally Reviewed:  Electrocardiogram Personally Reviewed: 3 = A little assistance

## 2025-04-08 NOTE — ED ADULT NURSE NOTE - OBJECTIVE STATEMENT
Pt A&OX4. BIBA for complaints of L sided chest pain, nausea and chills while at [employer]. Pt also endorsing palpitations. Denies numbness, weakness, tingling, headache. Pt found to be tachycardic, pt placed on monitor and . MD Jacobson at bedside.

## 2025-04-08 NOTE — DISCHARGE NOTE PROVIDER - PROVIDER TOKENS
PROVIDER:[TOKEN:[[id number]],FOLLOWUP:[1 week]] PROVIDER:[TOKEN:[[id number]],FOLLOWUP:[1 week]],PROVIDER:[TOKEN:[[id number]],FOLLOWUP:[2 weeks]]

## 2025-04-08 NOTE — CONSULT NOTE ADULT - PROBLEM SELECTOR RECOMMENDATION 9
in patient actively having chest pain  asa 324mg x 1 now  heparin drip  ntg paste  stat echo  EP consult  arrhythmia to be managed by EP  Labs are pending  trops pending k and mg  Plan for Cath pending blood work etc in patient actively having chest pain  asa 324mg x 1 now  heparin drip  ntg paste  stat echo  EP consult  arrhythmia to be managed by EP  Labs are pending  trops pending k and mg  Plan for left heart Cath pending blood work etc

## 2025-04-08 NOTE — ED ADULT TRIAGE NOTE - CHIEF COMPLAINT QUOTE
Pt presents to ED c/o chest pain. Pt has PMHx COPD (not on home O2). C/o pain to left side of her chest that started today while she was in [employer]. EKG in progress.

## 2025-04-08 NOTE — CONSULT NOTE ADULT - SUBJECTIVE AND OBJECTIVE BOX
Department of Cardiology                                                                  [facility]/[place] [address] [address] [facility]                                                            Telephone: [phone]. Fax:[phone]                                                                                     Interventional cardiology Consult note            Patient is a 89y old  Female who presents with a chief complaint of VF arrest     Cardiology consulting for : vFIB ARREST, FOR URGENT CATH     Plan: LHc with possible intervention     HPI:  Pt is a 89 y/o female with HTN, HLD, COPD (2L NC PRN at home), chronic diastolic heart failure, who presents to [facility] today  with complaints of chest pain.   Pt states she woke up with morning with a sharp pain in the R side of her chest, which progressively worsened throughout the day. Pt noted to be in an irregular WCT at time of arrival for which she was treated with IV lopressor. Pt subsequently lost her pulse and went into Tdp / VF arrest. Pt was urgently cardioverted with 200J x 1 in the ED and has been in sinus rhythm since.  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  she endorses left upper chest pain 5/10, comes and goes.   Denies HA, dizziness, palliations orthopnea.  Patient presents to [facility] for LHC with possible intervention.    PAST MEDICAL & SURGICAL HISTORY:  History of COPD      Hypothyroidism      HTN (hypertension)      HLD (hyperlipidemia)          RADIOLOGY & ADDITIONAL STUDIES/DIAGNOSTIC TESTING:      ECHO  FINDINGS: pending    STRESS  FINDINGS: NA    CATHETERIZATION  FINDINGS: NONE IN THE PAST       Allergies    Allergy Status Unknown    Intolerances        MEDICATIONS  (STANDING):  atorvastatin 80 milliGRAM(s) Oral at bedtime  heparin  Infusion.  Unit(s)/Hr (7.5 mL/Hr) IV Continuous <Continuous>  sodium chloride 0.9% Bolus 200 milliLiter(s) IV Bolus once    MEDICATIONS  (PRN):  heparin   Injectable 3800 Unit(s) IV Push every 6 hours PRN For aPTT less than 40      FAMILY HISTORY:  FH: HTN (hypertension) (Father)        SOCIAL HISTORY:    CIGARETTES:NO    ALCOHOL:NO    REVIEW OF SYSTEMS:  General: No fevers/chills, or fatigue  HEENT: No visual disturbances, no hearing loss, no headaches, no epistaxis.  CV: No chest pain,no palpitations, no edema, no orthopnea, no PND, or PARNELL  Respiratory: No dyspnea, no wheeze, no cough.  GI: no nausea/vomiting, no black/bloody stools.  : No hematuria  Musculoskeletal: No myalgias, no arthralgias, no back pain.    Vital Signs Last 24 Hrs  T(C): 36.3 (08 Apr 2025 12:05), Max: 36.3 (08 Apr 2025 12:05)  T(F): 97.4 (08 Apr 2025 12:05), Max: 97.4 (08 Apr 2025 12:05)  HR: 71 (08 Apr 2025 15:09) (71 - 154)  BP: 114/77 (08 Apr 2025 15:09) (114/77 - 156/75)  BP(mean): --  RR: 16 (08 Apr 2025 15:09) (16 - 18)  SpO2: 100% (08 Apr 2025 15:09) (97% - 100%)    Parameters below as of 08 Apr 2025 15:09  Patient On (Oxygen Delivery Method): room air        Daily     Daily     I&O's Detail      PHYSICAL EXAM:   GENERAL: Pt lying comfortably, NAD.  ENMT: PERRL, +EOMI.  NECK: soft, Supple, No JVD,   CHEST/LUNG: Clear to auscultatation bilaterally; No wheezing.  HEART: S1S2+, Regular rate and rhythm; No murmurs.  ABDOMEN: Soft, Nontender, Nondistended; Bowel sounds present.  MUSCULOSKELETAL: Normal range of motion. 1+ SWELLING B/L LE   SKIN: No rashes or lesions.  NEURO: AAOX3, no focal deficits, no motor r sensory loss.  PSYCH: normal mood.  VASCULAR:   Radial +2 R/+2 L  Femoral +2 R/+2 L  PT +2 R/+2 L  DP +2 R/+2 L      INTERPRETATION OF TELEMETRY: SR    ECG:Afib with RVR in am ekg, SR post Vfib arrest     LABS:                        13.0   16.01 )-----------( 197      ( 08 Apr 2025 12:45 )             42.2     04-08    142  |  102  |  32.4[H]  ----------------------------<  135[H]  4.1   |  25.0  |  1.21    Ca    9.5      08 Apr 2025 12:45  Mg     1.8     04-08    TPro  6.4[L]  /  Alb  3.9  /  TBili  0.4  /  DBili  x   /  AST  21  /  ALT  14  /  AlkPhos  55  04-08        PT/INR - ( 08 Apr 2025 12:45 )   PT: 11.6 sec;   INR: 1.03 ratio         PTT - ( 08 Apr 2025 12:45 )  PTT:26.1 sec  Urinalysis Basic - ( 08 Apr 2025 12:45 )    Color: x / Appearance: x / SG: x / pH: x  Gluc: 135 mg/dL / Ketone: x  / Bili: x / Urobili: x   Blood: x / Protein: x / Nitrite: x   Leuk Esterase: x / RBC: x / WBC x   Sq Epi: x / Non Sq Epi: x / Bacteria: x      I&O's Summary    BNP: 1562

## 2025-04-08 NOTE — DISCHARGE NOTE PROVIDER - HOSPITAL COURSE
89 female with HTN, HLD, COPD on 2L NC prn, hypothyroidism, and chronic diastolic heart failure who presented to the ED with complaints of chest pain. Initial telemetry revealed AF and she was treated with IV Lopressor. She subsequently lost her pulse and went into Torsades and then VF arrest; brief course of CPR followed by cardioversion with 200J x 1 with conversion to sinus rhythm. Troponin elevated 64, Echo showed preserved EF 60-65%. Magnesium was administered and the patient was noted to have regained consciousness. She underwent LHC on 4/8/25 and had PCI to Ramus. The patient was loaded with Amiodarone and switched to PO for discharge and Lopressor 25 mg PO BID with successful rate and rhythm control. The patient was started on Eliquis 5 mg PO BID, Aspirin 81 mg PO qd and Plavix 75 mg PO qd. Plan to continue Amiodarone 200 mg PO qd and Lopressor 25 mg BID. Plan to continue Aspirin, Eliquis and Plavix. Plan to dc Aspirin on 4/23/25. Plan to discharge the patient home with LifeVest. Patient received education regarding use; she understands and agrees to the plan. Patient to F/U outpatient with Cardiologist, Dr. Jude Alex. F/U appt made for 4/14/25 at 1 pm.         89 female with HTN, HLD, COPD on 2L NC prn, hypothyroidism, and chronic diastolic heart failure who presented to the ED with complaints of chest pain. Initial telemetry revealed AF and she was treated with IV Lopressor. She subsequently lost her pulse and went into Torsades and then VF arrest; brief course of CPR followed by cardioversion with 200J x 1 with conversion to sinus rhythm. Troponin elevated 64, Echo showed preserved EF 60-65%. Magnesium was administered and the patient was noted to have regained consciousness. She underwent LHC on 4/8/25 and had PCI to Ramus. The patient was loaded with Amiodarone and switched to PO for discharge and Lopressor 25 mg PO BID with successful rate and rhythm control. The patient was started on Eliquis 5 mg PO BID for new onset of Afib, DAPT Aspirin 81 mg PO qd and Plavix 75 mg PO qd. Plan to continue Amiodarone 200 mg PO qd and Lopressor 25 mg BID. Plan to dc Aspirin on 4/23/25. Plan to discharge the patient home with LifeVest. Patient received education regarding use; she understands and agrees to the plan. Patient to F/U outpatient with Cardiologist, Dr. Jude Alex. Course complicated by JOHNATHON 2/2 to ATN in postcardiac arrest/Afib with RVR and also IV contrast, followed by nephro, SCr downtrending and expected to continue to improve. Pt is medically optimized and clinically stable for discharge with appropriate follow-up cares.

## 2025-04-08 NOTE — CONSULT NOTE ADULT - ASSESSMENT
Pt is a 89 y/o female with HTN, HLD, COPD (2L NC PRN at home, USES O2 VERY RARELY), chronic diastolic heart failure, who presents to [facility] today  with complaints of chest pain.   Pt states she woke up with morning with a sharp pain in the R side of her chest, which progressively worsened throughout the day. Pt noted to be in an irregular WCT at time of arrival for which she was treated with IV lopressor. Pt subsequently lost her pulse and went into Tdp / VF arrest. Pt was urgently cardioverted with 200J x 1 in the ED and has been in sinus rhythm since.  Interventional cardiology consulted for Urgent LHc in setting of V fib arrest for ischemic evaluation  Patient was  evaluated at bedside, patient lying in bed, in no distress, A&OX4, follows verbal commands, C/O left mid chest discomfort.  her trop: 64, EKg with SR   Patient presents to [facility] for LHC with possible intervention.      Risk Assessments:  ASA: 4  Mallampati: 3  Creat: 1.21  GFR: 43  BRA: 2.9%    Plan:    -plan for  LHC VAI RRA/RFA  -preferred access: RRA/RFA  -confirmed appropriate NPO duration  -ECG and Labs reviewed  -Aspirin 81mg po pre-cath  -NS 0.9% 250ml/hr x 1 bolus; pre procedure TAMIE ppx   -procedure discussed with patient; risks and benefits explained, questions answered  -consent obtained by attending DR BUSTILLOS              Pt is a 89 y/o female with HTN, HLD, COPD (2L NC PRN at home, USES O2 VERY RARELY), chronic diastolic heart failure, who presents to [facility] today  with complaints of chest pain.   Pt states she woke up with morning with a sharp pain in the R side of her chest, which progressively worsened throughout the day. Pt noted to be in an irregular WCT at time of arrival for which she was treated with IV lopressor. Pt subsequently lost her pulse and went into Tdp / VF arrest. Pt was urgently cardioverted with 200J x 1 in the ED and has been in sinus rhythm since.  Interventional cardiology consulted for Urgent LHc in setting of V fib arrest for ischemic evaluation  Patient was  evaluated at bedside, patient lying in bed, in no distress, A&OX4, follows verbal commands, C/O left mid chest discomfort.  her trop: 64, EKg with SR   Patient presents to [facility] for LHC with possible intervention.      Risk Assessments:  ASA: 4  Mallampati: 3  Creat: 1.21  GFR: 43  BRA: 2.9%    Plan:    -plan for  LHC VAI RRA/RFA  -preferred access: RRA/RFA  -confirmed appropriate NPO duration  -ECG and Labs reviewed  -Aspirin 81mg po pre-cath  -NS 0.9% 250ml/hr x 1 bolus; pre procedure TAMIE ppx   -procedure discussed with patient; risks and benefits explained, questions answered  -consent obtained by attending DR BUSTILLOS     Risks, benefits, and alternatives reviewed.  Risks including but not limited to MI, death, stroke, bleeding, infection, vessel injury, hematoma, renal failure, allergic reaction, urgent open heart surgery, restenosis and stent thrombosis were reviewed.  All questions answered.  Patient is agreeable to proceed.   Pt. assessed, appropriate for sedation, pt. educated regarding the plan for Versed/Fentanyl as needed.             Pt is a 89 y/o female with HTN, HLD, COPD (2L NC PRN at home, USES O2 VERY RARELY), chronic diastolic heart failure, who presents to [facility] today  with complaints of chest pain.   Pt states she woke up with morning with a sharp pain in the R side of her chest, which progressively worsened throughout the day. Pt noted to be in an irregular WCT at time of arrival for which she was treated with IV lopressor. Pt subsequently lost her pulse and went into Tdp / VF arrest. Pt was urgently cardioverted with 200J x 1 in the ED and has been in sinus rhythm since.  Interventional cardiology consulted for Urgent LHc in setting of V fib arrest for ischemic evaluation  Patient was  evaluated at bedside, patient lying in bed, in no distress, A&OX4, follows verbal commands, C/O left mid chest discomfort.  her trop: 64, EKg with SR   Patient presents to [facility] for LHC with possible intervention.      Risk Assessments:  ASA: 4  Mallampati: 3  Creat: 1.21  GFR: 43  BRA: 2.9%    Plan:    -plan for  LHC VAI RRA/RFA  -preferred access: RRA/RFA  -confirmed appropriate NPO duration  -ECG and Labs reviewed  -Aspirin 81mg po pre-cath  -NS 0.9% 200 ml/hr x 2 hours  bolus; pre procedure TAMIE ppx in setting of HF   -procedure discussed with patient; risks and benefits explained, questions answered  -TTE pending   -consent obtained by attending DR BUSTILLOS     Risks, benefits, and alternatives reviewed.  Risks including but not limited to MI, death, stroke, bleeding, infection, vessel injury, hematoma, renal failure, allergic reaction, urgent open heart surgery, restenosis and stent thrombosis were reviewed.  All questions answered.  Patient is agreeable to proceed.   Pt. assessed, appropriate for sedation, pt. educated regarding the plan for Versed/Fentanyl as needed.

## 2025-04-08 NOTE — CONSULT NOTE ADULT - SUBJECTIVE AND OBJECTIVE BOX
[facility]  [facility]   Office: [address] [place] [address]  Telephone: [phone] Fax:[phone]      HPI:  Pt is a 88 y/o female with HTN, HLD, COPD (2L NC PRN at home), chronic diastolic heart failure, who presents to [facility] with complaints of chest pain. Pt states she woke up with morning with a sharp pain in the R side of her chest, which progressively worsened throughout the day. Pt noted to be in an irregular WCT at time of arrival for which she was treated with IV lopressor. Pt subsequently lost her pulse and went into Tdp / VF arrest. Pt was urgently cardioverted with 200J x 1 in the ED and has been in sinus rhythm since. EP service consulted at this time for arrythmia management.    Pt reports persistent R sided chest pain at time of assessment. Pt reports no known arrythmia history. Currently denies SOB, fever, chills, nausea, vomiting, recent viral illness.      Telemetry: Currently sinus rhythm with intact conduction. Previously atrial fibrillation with RVR with aberrant conduction, degenerating to Tdp / VF arrest.  EKG: SR with intact conduction. GA 128ms. QRS 70ms. QTc 440ms.          PAST MEDICAL & SURGICAL HISTORY:  History of COPD      Hypothyroidism      HTN (hypertension)      HLD (hyperlipidemia)          REVIEW OF SYSTEMS:    CONSTITUTIONAL: No fever, weight loss, or fatigue  EYES: No visual disturbances  NECK: No pain or stiffness  RESPIRATORY: No cough, wheezing, chills or hemoptysis; No shortness of breath  CARDIOVASCULAR: see HPI  GASTROINTESTINAL: No abdominal or epigastric pain. No nausea, vomiting, or hematemesis; No diarrhea or constipation. No melena or hematochezia.  NEUROLOGICAL: No headaches, memory loss, loss of strength, numbness, or tremors  SKIN: No itching, burning, rashes, or lesions         MEDICATIONS  (STANDING):  aMIOdarone IVPB 150 milliGRAM(s) IV Intermittent once  aspirin  chewable 324 milliGRAM(s) Oral once  atorvastatin 80 milliGRAM(s) Oral at bedtime  heparin   Injectable 2000 Unit(s) IV Push once  heparin  Infusion.  Unit(s)/Hr (7.5 mL/Hr) IV Continuous <Continuous>  magnesium sulfate  IVPB 2 Gram(s) IV Intermittent Once  nitroglycerin    2% Ointment 0.25 Inch(s) Transdermal Once    MEDICATIONS  (PRN):  heparin   Injectable 3800 Unit(s) IV Push every 6 hours PRN For aPTT less than 40      Allergies    Allergy Status Unknown    Intolerances        SOCIAL HISTORY:  Former smoker  Denies ETOH    FAMILY HISTORY:  FH: HTN (hypertension) (Father)        Vital Signs Last 24 Hrs  T(C): 36.3 (08 Apr 2025 12:05), Max: 36.3 (08 Apr 2025 12:05)  T(F): 97.4 (08 Apr 2025 12:05), Max: 97.4 (08 Apr 2025 12:05)  HR: 76 (08 Apr 2025 13:52) (76 - 154)  BP: 123/59 (08 Apr 2025 13:52) (123/59 - 156/75)  BP(mean): --  RR: 18 (08 Apr 2025 12:05) (18 - 18)  SpO2: 97% (08 Apr 2025 12:05) (97% - 97%)    Parameters below as of 08 Apr 2025 12:05  Patient On (Oxygen Delivery Method): room air        Physical Exam:    Constitutional: Well-developed, well nourished, in no acute distress  Head: normocephalic atraumatic  Chest wall: + TTP R sided chest  Resp: effort normal, breath sounds clear to auscultation bilaterally  Cardiac: Heart regular rate and rhythm, no murmurs, rubs, or gallops.  No edema.  Abdomen: soft, nondistended, bowel sounds active, nontender to palpation in all four quadrants    Neuro: Alert and oriented x 3        LABS:                        13.0   16.01 )-----------( 197      ( 08 Apr 2025 12:45 )             42.2         PT/INR - ( 08 Apr 2025 12:45 )   PT: 11.6 sec;   INR: 1.03 ratio         PTT - ( 08 Apr 2025 12:45 )  PTT:26.1 sec        RADIOLOGY & ADDITIONAL STUDIES:    TTE: Pending        Assessment:    Pt is a 88 y/o female with HTN, HLD, COPD (2L NC PRN at home), chronic diastolic heart failure, who presents to [facility] with complaints of chest pain. Pt states she woke up with morning with a sharp pain in the R side of her chest, which progressively worsened throughout the day. Pt noted to be in an irregular WCT at time of arrival for which she was treated with IV lopressor. Pt subsequently lost her pulse and went into Tdp / VF arrest. Pt was urgently cardioverted with 200J x 1 in the ED and has been in sinus rhythm since. EP service consulted at this time for arrythmia management.    Pt reports persistent R sided chest pain at time of assessment. Pt reports no known arrythmia history. Currently denies SOB, fever, chills, nausea, vomiting, recent viral illness.      Plan:  1) VF arrest with + NSTEMI   - Aggressive Mg supplementation. 4g ordered  - Interventional cardiology consult for [facility] consideration  - Amiodarone 150mg IVP x 1. Then 1mg/min x 6 hours, followed by .5mg x 18 hours  - Stat TTE  - Maintain telemetry monitoring  - Maintain Mg > 2 and K > 4    2) Atrial fibrillation with RVR  - Currently maintaining sinus rhythm  - Amiodarone as above  - CHADSVASc = 4. Currently on full dose AC with Heparin gtt. Plan to convert to DOAC prior to discharge      INCOMPLETE NOTE, FURTHER RECOMMENDATIONS TO FOLLOW [facility]  [facility]   Office: [address] [place] [address]  Telephone: [phone] Fax:[phone]      HPI:  Pt is a 89 y/o female with HTN, HLD, COPD (2L NC PRN at home), chronic diastolic heart failure, who presents to [facility] with complaints of chest pain. Pt states she woke up with morning with a sharp pain in the R side of her chest, which progressively worsened throughout the day. Pt noted to be in an irregular WCT at time of arrival for which she was treated with IV lopressor. Pt subsequently lost her pulse and went into Tdp / VF arrest. Pt was urgently cardioverted with 200J x 1 in the ED and has been in sinus rhythm since. EP service consulted at this time for arrythmia management.    Pt reports persistent R sided chest pain at time of assessment. Pt reports no known arrythmia history. Currently denies SOB, fever, chills, nausea, vomiting, recent viral illness.      Telemetry: Currently sinus rhythm with intact conduction. Previously atrial fibrillation with RVR with aberrant conduction, degenerating to Tdp / VF arrest.  EKG: SR with intact conduction. OH 128ms. QRS 70ms. QTc 440ms.          PAST MEDICAL & SURGICAL HISTORY:  History of COPD      Hypothyroidism      HTN (hypertension)      HLD (hyperlipidemia)          REVIEW OF SYSTEMS:    CONSTITUTIONAL: No fever, weight loss, or fatigue  EYES: No visual disturbances  NECK: No pain or stiffness  RESPIRATORY: No cough, wheezing, chills or hemoptysis; No shortness of breath  CARDIOVASCULAR: see HPI  GASTROINTESTINAL: No abdominal or epigastric pain. No nausea, vomiting, or hematemesis; No diarrhea or constipation. No melena or hematochezia.  NEUROLOGICAL: No headaches, memory loss, loss of strength, numbness, or tremors  SKIN: No itching, burning, rashes, or lesions         MEDICATIONS  (STANDING):  aMIOdarone IVPB 150 milliGRAM(s) IV Intermittent once  aspirin  chewable 324 milliGRAM(s) Oral once  atorvastatin 80 milliGRAM(s) Oral at bedtime  heparin   Injectable 2000 Unit(s) IV Push once  heparin  Infusion.  Unit(s)/Hr (7.5 mL/Hr) IV Continuous <Continuous>  magnesium sulfate  IVPB 2 Gram(s) IV Intermittent Once  nitroglycerin    2% Ointment 0.25 Inch(s) Transdermal Once    MEDICATIONS  (PRN):  heparin   Injectable 3800 Unit(s) IV Push every 6 hours PRN For aPTT less than 40      Allergies    Allergy Status Unknown    Intolerances        SOCIAL HISTORY:  Former smoker  Denies ETOH    FAMILY HISTORY:  FH: HTN (hypertension) (Father)        Vital Signs Last 24 Hrs  T(C): 36.3 (08 Apr 2025 12:05), Max: 36.3 (08 Apr 2025 12:05)  T(F): 97.4 (08 Apr 2025 12:05), Max: 97.4 (08 Apr 2025 12:05)  HR: 76 (08 Apr 2025 13:52) (76 - 154)  BP: 123/59 (08 Apr 2025 13:52) (123/59 - 156/75)  BP(mean): --  RR: 18 (08 Apr 2025 12:05) (18 - 18)  SpO2: 97% (08 Apr 2025 12:05) (97% - 97%)    Parameters below as of 08 Apr 2025 12:05  Patient On (Oxygen Delivery Method): room air        Physical Exam:    Constitutional: Well-developed, well nourished, in no acute distress  Head: normocephalic atraumatic  Chest wall: + TTP R sided chest  Resp: effort normal, breath sounds clear to auscultation bilaterally  Cardiac: Heart regular rate and rhythm, no murmurs, rubs, or gallops.  No edema.  Abdomen: soft, nondistended, bowel sounds active, nontender to palpation in all four quadrants    Neuro: Alert and oriented x 3        LABS:                        13.0   16.01 )-----------( 197      ( 08 Apr 2025 12:45 )             42.2         PT/INR - ( 08 Apr 2025 12:45 )   PT: 11.6 sec;   INR: 1.03 ratio         PTT - ( 08 Apr 2025 12:45 )  PTT:26.1 sec        RADIOLOGY & ADDITIONAL STUDIES:    TTE: Pending        Assessment:    Pt is a 89 y/o female with HTN, HLD, COPD (2L NC PRN at home), chronic diastolic heart failure, who presents to [facility] with complaints of chest pain. Pt states she woke up with morning with a sharp pain in the R side of her chest, which progressively worsened throughout the day. Pt noted to be in an irregular WCT at time of arrival for which she was treated with IV lopressor. Pt subsequently lost her pulse and went into Tdp / VF arrest. Pt was urgently cardioverted with 200J x 1 in the ED and has been in sinus rhythm since. EP service consulted at this time for arrythmia management.    Pt reports persistent R sided chest pain at time of assessment. Pt reports no known arrythmia history. Currently denies SOB, fever, chills, nausea, vomiting, recent viral illness.      Plan:  1) VF arrest with + NSTEMI   - Aggressive Mg supplementation. 4g ordered  - Interventional cardiology consult for [facility] consideration  - Amiodarone 150mg IVP x 1. Then 1mg/min x 6 hours, followed by .5mg x 18 hours  - Stat TTE  - Maintain telemetry monitoring  - Maintain Mg > 2 and K > 4    2) Atrial fibrillation with RVR  - Currently maintaining sinus rhythm  - Amiodarone as above  - CHADSVASc = 4. Currently on full dose AC with Heparin gtt. Plan to convert to DOAC prior to discharge    Discussed with Dr. Ramos

## 2025-04-08 NOTE — CONSULT NOTE ADULT - NS ATTEND AMEND GEN_ALL_CORE FT
pt presented with AF with RVR, and then degenerated into VF.   C now in progress- large thrombotic proximal ramus occlusion noted, suggesting ischemic etiology of her arrest.   Plan for primary PCI.  TTE was normal.   No indication for ICD implant at this time.     continue to monitor on telemetry- given AF degeneration into VF, and short WV noted on ECG, some possibility of accessory pathway conduction as well. If there remains suspicion for this, would consider EP Study and possible AP/AF ablation.
Patient seen and examined by me.    I have discussed my recommendation with the PA which are outlined above.  Will follow.

## 2025-04-08 NOTE — PATIENT PROFILE ADULT - FALL HARM RISK - HARM RISK INTERVENTIONS
Assistance with ambulation/Assistance OOB with selected safe patient handling equipment/Communicate Risk of Fall with Harm to all staff/Discuss with provider need for PT consult/Monitor gait and stability/Provide patient with walking aids - walker, cane, crutches/Reinforce activity limits and safety measures with patient and family/Sit up slowly, dangle for a short time, stand at bedside before walking/Tailored Fall Risk Interventions/Use of alarms - bed, chair and/or voice tab/Visual Cue: Yellow wristband and red socks/Bed in lowest position, wheels locked, appropriate side rails in place/Call bell, personal items and telephone in reach/Instruct patient to call for assistance before getting out of bed or chair/Non-slip footwear when patient is out of bed/[place] to call system/Physically safe environment - no spills, clutter or unnecessary equipment/Purposeful Proactive Rounding/Room/bathroom lighting operational, light cord in reach

## 2025-04-08 NOTE — DISCHARGE NOTE PROVIDER - NSDCFUSCHEDAPPT_GEN_ALL_CORE_FT
[facility]  [address]ULED [address]  Scheduled Appointment: 04/17/2025    Deshawn Vidales  [facility]  PULED [address]  Scheduled Appointment: 04/17/2025    Jude Alex  [facility]  CARDIOLOGY [address]  Scheduled Appointment: 06/12/2025     [facility]  CARDIOLOGY [address]  Scheduled Appointment: 04/14/2025    [facility]  [address]ED [address]  Scheduled Appointment: 04/17/2025    Deshawn Vidales  [facility]  [address]  Scheduled Appointment: 04/17/2025    Jude Alex  [facility]  CARDIOLOGY [address]  Scheduled Appointment: 06/12/2025     [facility]  [address]ULED [address]  Scheduled Appointment: 04/17/2025    Deshawn Vidales  [facility]  [address]ULED [address]  Scheduled Appointment: 04/17/2025    Jude Alex  [facility]  CARDIOLOGY [address]  Scheduled Appointment: 06/12/2025

## 2025-04-08 NOTE — ED PROVIDER NOTE - ATTENDING CONTRIBUTION TO CARE
88 y/o F pt w/ PMHx of HTN, HLD, hypothyroid presenting w/ chest pain. found to be in irregular tachycardia, afib w/ abberancy versus intermittent ventricular beats. given lopressor 5mg IV x1 without response. 15 min following pt developed TdP into vfib. Unable to obtain pulse. pt was defibrillated w/ 200 joules immediately and about 15 seconds of cpr before patient began exhibiting purposeful movement. repeat ekg showing sinus rhythm. VSS. magnesium IV given. EP consulted.    I, Luis Henry, performed the initial face to face bedside interview with this patient regarding history of present illness, review of symptoms and relevant past medical, social and family history.  I completed an independent physical examination.  I was the initial provider who evaluated this patient. I have signed out the follow up of any pending tests (i.e. labs, radiological studies) to the resident .  I have communicated the patient’s plan of care and disposition with the resident

## 2025-04-08 NOTE — H&P ADULT - ASSESSMENT
89F with a history of hypertension, COPD, and hypothyroidism, who presented with chest discomfort and developed cardiac arrest with ventricular fibrillation    Cardiac arrest  - Electrical cardioversion resulting in sinus rhythm  - Noted to have Torsades de pointes and magnesium was supplemented  - Discussed with Cardiology and Electrophysiology  - Planned for urgent cardiac catheterization  - Heparin infusion initiated  - Amiodarone infusion initiated  - For echocardiogram to assess ventricular function  - Discussed with ICU who discussed with electrophysiology, no need for monitoring in the intensive care unit    Atrial fibrillation  - On amiodarone  - Heparin infusion for anticoagulation with plans for eventual conversion to DOAC    COPD  - Not in acute exacerbation  - Albuterol as needed  - Continue on tiotropium    Hypothyroidism  On levothyroxine        Dispo: Anticipate eventual discharge home pending clinical course and outcome of cardiac catheterization 89F with a history of hypertension, COPD, and hypothyroidism, who presented with chest discomfort and developed cardiac arrest with ventricular fibrillation    Cardiac arrest  - Electrical cardioversion resulting in sinus rhythm  - Noted to have Torsades de pointes and magnesium was supplemented  - Discussed with Cardiology and Electrophysiology  - Planned for urgent cardiac catheterization  - Heparin infusion initiated  - Amiodarone infusion initiated  - For echocardiogram to assess ventricular function  - Discussed with ICU who discussed with electrophysiology, no need for monitoring in the intensive care unit    Atrial fibrillation  - On amiodarone  - Heparin infusion for anticoagulation with plans for eventual conversion to DOAC    COPD  - Not in acute exacerbation  - Albuterol as needed  - Continue on tiotropium    Hypothyroidism  - On levothyroxine        Dispo: Anticipate eventual discharge home pending clinical course and outcome of cardiac catheterization 89F with a history of hypertension, COPD, and hypothyroidism, who presented with chest discomfort and developed cardiac arrest with ventricular fibrillation    Cardiac arrest  - Electrical cardioversion resulting in sinus rhythm  - Noted to have Torsades de pointes and magnesium was supplemented  - Discussed with Cardiology and Electrophysiology  - Planned for urgent cardiac catheterization  - Heparin infusion initiated  - Amiodarone infusion initiated  - For echocardiogram to assess ventricular function  - Discussed with ICU who discussed with electrophysiology, no need for monitoring in the intensive care unit    Atrial fibrillation  - On amiodarone  - Heparin infusion for anticoagulation with plans for eventual conversion to DOAC    COPD  - Not in acute exacerbation  - Albuterol as needed  - Continue on fluticasone/salmeterol and tiotropium    Hypothyroidism  - On levothyroxine        Dispo: Anticipate eventual discharge home pending clinical course and outcome of cardiac catheterization

## 2025-04-08 NOTE — CONSULT NOTE ADULT - SUBJECTIVE AND OBJECTIVE BOX
[facility]                                              CARDIOLOGY AT [address], [place] [address]                                             Telephone: [phone]. Fax:[phone]                                                         CARDIOLOGY CONSULTATION NOTE                                                                                             Consult requested by:  Dr Henry  History obtained by: Patient and medical record  Community Cardiologist: Dr Alex   obtained: Yes [  ] No [ x ]  Reason for Consultation:  vfib arrest  Avialable out pt records reviewed: Yes [ x ] No [  ]    This is a 89 y/o female with obesity, HTN, HLD and Copd who woke up and did not feel well, felt weak.  Went to [employer] and started to experience chest discomfort which worsened.  Brought to ER was being evaluated and had a witnessed torsades arrest, cardioverted to atrial fib then converted to NSR.  Feeling ok now but still has chest pain.  No sob    CARDIAC TESTING   ECHO:  11/23  1. The left atrium is moderately dilated in size.  2. Left ventricular systolic function is normal with an ejection fraction of 62 % by Pompa's method of disks.  3. There is moderate (grade 2) left ventricular diastolic dysfunction, with elevated filling pressure.  4. The right atrium is normal in size.  5. Normal right ventricular cavity size and systolic function.  6. Mild mitral regurgitation.  7. Mild aortic regurgitation.  8. Mild tricuspid regurgitation.  9. Estimated pulmonary artery systolic pressure is 44 mmHg, consistent with mild pulmonary hypertension.  10. No pericardial effusion seen.  11. No prior echocardiogram is available for comparison.          PAST MEDICAL HISTORY  History of COPD  Hypothyroidism  HTN (hypertension)  HLD (hyperlipidemia)    PAST SURGICAL HISTORY  None      SOCIAL HISTORY:  Denies smoking/alcohol/drugs  CIGARETTES:     ALCOHOL:  DRUGS:      Family History of Cardiovascular Disease:  Yes [  ] No [ x ]  Coronary Artery Disease in first degree relative: Yes [  ] No [ x ]  Sudden Cardiac Death in First degree relative: Yes [  ] No [ x ]      HOME MEDICATIONS:  ? Albuterol 90 MCG/ACT AERS; INHALE 1 TO 2 PUFFS EVERY 4 TO 6 HOURS AS    NEEDED  ?? Albuterol Sulfate (2.5 MG/3ML) 0.083% Inhalation Nebulization Solution; USE 1 VIAL IN      NEBULIZER EVERY 4 TO 6 HOURS AS NEEDED FOR WHEEZING  ?? Albuterol Sulfate  (90 Base) MCG/ACT Inhalation Aerosol Solution; inhale 2 puffs     by mouth every 4 to 6 hours if needed  ?? amLODIPine Besylate TABS  ?? Fluticasone-Salmeterol 250-50 MCG/DOSE AEPB; USE 1 INHALATION EVERY 12     HOURS  ?? Folic Acid TABS  ?? Losartan Potassium-HCTZ 100-25 MG Oral Tablet; TAKE 1 TABLET ONCE DAILY  ?? Optimal D3 CAPS  ?? PredniSONE 10 MG Oral Tablet; PRED 10 MG TAKE 3 PILLS X2 DAYS THEN DECREASE       BY 1/2 TABLET EVERY 3 DAYS  ?? PredniSONE 10 MG Oral Tablet; PRED 10 MG TAKE 3 PILLS X2 DAYS THEN DECREASE      BY 1/2 TABLET EVERY 3 DAYS  ?? Spiriva HandiHaler 18 MCG Inhalation Capsule; INHALE CONTENTS OF CAPSULE     ONCE DAY  ?? Synthroid TABS      CURRENT MEDICATIONS:  aMIOdarone Infusion 1 mG/Min IV Continuous <Continuous>  aspirin  chewable    ALLERGIES:  NKDA    REVIEW OF SYMPTOMS:   CONSTITUTIONAL: No fever, no chills, no weight loss, no weight gain, no fatigue   ENMT:  No vertigo; No sinus or throat pain  NECK: No pain or stiffness  CARDIOVASCULAR: see hpi  RESPIRATORY: no Shortness of breath, no cough, no wheezing  : No dysuria, no hematuria   GI: No dark color stool, no nausea, no diarrhea, no constipation, no abdominal pain   NEURO: No headache, no slurred speech   MUSCULOSKELETAL: No joint pain or swelling; No muscle, back, or extremity pain  PSYCH: No agitation, no anxiety.    ALL OTHER REVIEW OF SYSTEMS ARE NEGATIVE.      Vital Signs Last 24 Hrs  T(C): 36.3 (08 Apr 2025 12:05), Max: 36.3 (08 Apr 2025 12:05)  T(F): 97.4 (08 Apr 2025 12:05), Max: 97.4 (08 Apr 2025 12:05)  HR: 154 (08 Apr 2025 12:05) (154 - 154)  BP: 156/75 (08 Apr 2025 12:05) (156/75 - 156/75)  BP(mean): --  RR: 18 (08 Apr 2025 12:05) (18 - 18)  SpO2: 97% (08 Apr 2025 12:05) (97% - 97%)    Parameters below as of 08 Apr 2025 12:05  Patient On (Oxygen Delivery Method): room air      INTAKE AND OUTPUT:     PHYSICAL EXAM:  Constitutional: Comfortable . No acute distress. complianing of chest  pain  HEENT: Atraumatic and normocephalic , neck is supple . no JVD. No carotid bruit.  CNS: A&Ox3. No focal deficits.   Respiratory: CTAB, unlabored   Cardiovascular: RRR normal s1 s2. No murmur. No rubs or gallop.  Gastrointestinal: Soft, non-tender. +Bowel sounds.   MSK: full ROM extremities x 4  Extremities: No edema. No cyanosis   Psychiatric: Calm . no agitation.   Skin: Warm and dry, no ulcers on extremities       LABS:    Pending        INTERPRETATION OF TELEMETRY:     ECG: 3  NSR no ischemci changes          2  Atrial fib with moderate rate  Prior ECG: Yes [  ] No [  ]      RADIOLOGY & ADDITIONAL STUDIES:    X-ray:  reviewed by me. Pending  CT scan:   MRI:   US:                                                [facility]                                              CARDIOLOGY AT [address], [place] [address]                                             Telephone: [phone]. Fax:[phone]                                                         CARDIOLOGY CONSULTATION NOTE                                                                                             Consult requested by:  Dr Henry  History obtained by: Patient and medical record  Community Cardiologist: Dr Alex   obtained: Yes [  ] No [ x ]  Reason for Consultation:  vfib arrest  Avialable out pt records reviewed: Yes [ x ] No [  ]    This is a 89 y/o female with obesity, HTN, HLD and Copd who woke up and did not feel well, felt weak.  Went to [employer] and started to experience chest discomfort which worsened.  Brought to ER was being evaluated and had a witnessed torsades arrest, cardioverted to atrial fib then converted to NSR.  Feeling ok now but still has chest pain.  No sob  received iv mg in ER    CARDIAC TESTING   ECHO:  11/23  1. The left atrium is moderately dilated in size.  2. Left ventricular systolic function is normal with an ejection fraction of 62 % by Pompa's method of disks.  3. There is moderate (grade 2) left ventricular diastolic dysfunction, with elevated filling pressure.  4. The right atrium is normal in size.  5. Normal right ventricular cavity size and systolic function.  6. Mild mitral regurgitation.  7. Mild aortic regurgitation.  8. Mild tricuspid regurgitation.  9. Estimated pulmonary artery systolic pressure is 44 mmHg, consistent with mild pulmonary hypertension.  10. No pericardial effusion seen.  11. No prior echocardiogram is available for comparison.    PAST MEDICAL HISTORY  History of COPD  Hypothyroidism  HTN (hypertension)  HLD (hyperlipidemia)    PAST SURGICAL HISTORY  None      SOCIAL HISTORY:  Denies smoking/alcohol/drugs  Family History of Cardiovascular Disease:  Yes [  ] No [ x ]  Coronary Artery Disease in first degree relative: Yes [  ] No [ x ]  Sudden Cardiac Death in First degree relative: Yes [  ] No [ x ]      HOME MEDICATIONS:  ? Albuterol 90 MCG/ACT AERS; INHALE 1 TO 2 PUFFS EVERY 4 TO 6 HOURS AS    NEEDED  ?? Albuterol Sulfate (2.5 MG/3ML) 0.083% Inhalation Nebulization Solution; USE 1 VIAL IN      NEBULIZER EVERY 4 TO 6 HOURS AS NEEDED FOR WHEEZING  ?? Albuterol Sulfate  (90 Base) MCG/ACT Inhalation Aerosol Solution; inhale 2 puffs     by mouth every 4 to 6 hours if needed  ?? amLODIPine Besylate TABS  ?? Fluticasone-Salmeterol 250-50 MCG/DOSE AEPB; USE 1 INHALATION EVERY 12     HOURS  ?? Folic Acid TABS  ?? Losartan Potassium-HCTZ 100-25 MG Oral Tablet; TAKE 1 TABLET ONCE DAILY  ?? Optimal D3 CAPS  ?? PredniSONE 10 MG Oral Tablet; PRED 10 MG TAKE 3 PILLS X2 DAYS THEN DECREASE       BY 1/2 TABLET EVERY 3 DAYS  ?? PredniSONE 10 MG Oral Tablet; PRED 10 MG TAKE 3 PILLS X2 DAYS THEN DECREASE      BY 1/2 TABLET EVERY 3 DAYS  ?? Spiriva HandiHaler 18 MCG Inhalation Capsule; INHALE CONTENTS OF CAPSULE     ONCE DAY  ?? Synthroid TABS      CURRENT MEDICATIONS:  aMIOdarone Infusion 1 mG/Min IV Continuous <Continuous>  aspirin  chewable    ALLERGIES:  NKDA    REVIEW OF SYMPTOMS:   CONSTITUTIONAL: No fever, no chills, no weight loss, no weight gain, no fatigue   ENMT:  No vertigo; No sinus or throat pain  NECK: No pain or stiffness  CARDIOVASCULAR: see hpi  RESPIRATORY: no Shortness of breath, no cough, no wheezing  : No dysuria, no hematuria   GI: No dark color stool, no nausea, no diarrhea, no constipation, no abdominal pain   NEURO: No headache, no slurred speech   MUSCULOSKELETAL: No joint pain or swelling; No muscle, back, or extremity pain  PSYCH: No agitation, no anxiety.    ALL OTHER REVIEW OF SYSTEMS ARE NEGATIVE.      Vital Signs Last 24 Hrs  T(C): 36.3 (08 Apr 2025 12:05), Max: 36.3 (08 Apr 2025 12:05)  T(F): 97.4 (08 Apr 2025 12:05), Max: 97.4 (08 Apr 2025 12:05)  HR: 154 (08 Apr 2025 12:05) (154 - 154)  BP: 156/75 (08 Apr 2025 12:05) (156/75 - 156/75)  BP(mean): --  RR: 18 (08 Apr 2025 12:05) (18 - 18)  SpO2: 97% (08 Apr 2025 12:05) (97% - 97%)    Parameters below as of 08 Apr 2025 12:05  Patient On (Oxygen Delivery Method): room air      INTAKE AND OUTPUT:     PHYSICAL EXAM:  Constitutional: Comfortable . No acute distress. complianing of chest  pain  HEENT: Atraumatic and normocephalic , neck is supple . no JVD. No carotid bruit.  CNS: A&Ox3. No focal deficits.   Respiratory: CTAB, unlabored   Cardiovascular: RRR normal s1 s2. No murmur. No rubs or gallop.  Gastrointestinal: Soft, non-tender. +Bowel sounds.   MSK: full ROM extremities x 4  Extremities: No edema. No cyanosis   Psychiatric: Calm . no agitation.   Skin: Warm and dry, no ulcers on extremities       LABS:  Pending        INTERPRETATION OF TELEMETRY:     ECG: 3  NSR no ischemic changes          2  Atrial fib with moderate rate  Prior ECG: Yes [  ] No [  ]      RADIOLOGY & ADDITIONAL STUDIES:    X-ray:  reviewed by me. Pending  CT scan:   MRI:   US:

## 2025-04-08 NOTE — DISCHARGE NOTE PROVIDER - NSDCCPCAREPLAN_GEN_ALL_CORE_FT
PRINCIPAL DISCHARGE DIAGNOSIS  Diagnosis: VF (ventricular fibrillation)  Assessment and Plan of Treatment:       SECONDARY DISCHARGE DIAGNOSES  Diagnosis: Atrial premature depolarization with aberrant ventricular conduction  Assessment and Plan of Treatment:      PRINCIPAL DISCHARGE DIAGNOSIS  Diagnosis: VF (ventricular fibrillation)  Assessment and Plan of Treatment: - Continue amiodarone 200mg TID to complete 14 day course until 4/23/25, then transition to 200mg daily thereafter starting 4/24/25  - TFTs & LFTs should be monitored q 3-6 months while on amiodarone therapy. Anticipate <1 year of amiodarone therapy for this patient; however if > 1 year, patient will need annual fundoscopic exam and PFTs. Patient is advised of associated risks and need for monitoring; all questions answered to pt's expressed understanding.   - Advised to wear LifeVest to prevent SCD      SECONDARY DISCHARGE DIAGNOSES  Diagnosis: CAD (coronary artery disease)  Assessment and Plan of Treatment: # CAD   - s/p PCI to ramus   - Currently on triple therapy with aspirin 81mg, Plavix 75mg and Eliquis 5mg BID. Per cardiology, continue triple therapy until 4/23 at which time she will discontinue aspirin.   Coronary artery disease is a condition where the arteries the supply the heart muscle get clogges with fatty deposits & puts you at risk for a heart attack  Call your doctor if you have any new pain, pressure, or discomfort in the center of your chest, pain, tingling or discomfort in arms, back, neck, jaw, or stomach, shortness of breath, nausea, vomiting, burping or heartburn, sweating, cold and clammy skin, racing or abnormal heartbeat for more than 10 minutes or if they keep coming & going.  Call 911 and do not tr to get to hospital by care  You can help yourself with lefestyle changes (quitting smoking if you smoke), eat lots of fruits & vegetables & low fat dairy products, not a lot of meat & fatty foods, walk or some form of physical activity most days of the week, lose weight if you are overweight  Take your cardiac medication as prescribed to lower cholesterol, to lower blood pressure, aspirin to prevent blood clots, and diabetes control  Make sure to keep appointments with doctor for cardiac follow up care      Diagnosis: Atrial fibrillation  Assessment and Plan of Treatment: New onset   - Continue Eliquis 5mg Q 12 hrs.   - Continue metoprolol 25mg BID   - Continue amiodarone as above.   - Outpatient follow up with Dr. Ramos to discuss possible ablation.    Diagnosis: JOHNATHON (acute kidney injury)  Assessment and Plan of Treatment: JOHNATHON 2/2 to ATN postcardiac arrest/ Afib with RVR and IV contrast   - Followed by Nephro  - baseline SCr ~1  - SCr has been downtrending, last 1.56  - Hold Losartan until SCr back to baseline

## 2025-04-08 NOTE — DISCHARGE NOTE PROVIDER - ATTENDING DISCHARGE PHYSICAL EXAMINATION:
Vital Signs Last 24 Hrs  T(C): 36.5 (14 Apr 2025 10:58), Max: 36.9 (13 Apr 2025 16:20)  T(F): 97.7 (14 Apr 2025 10:58), Max: 98.5 (13 Apr 2025 16:20)  HR: 73 (14 Apr 2025 10:58) (61 - 73)  BP: 123/47 (14 Apr 2025 10:58) (123/47 - 165/67)  BP(mean): 98 (13 Apr 2025 22:18) (98 - 98)  RR: 18 (14 Apr 2025 10:58) (18 - 18)  SpO2: 97% (14 Apr 2025 10:58) (94% - 98%)    Parameters below as of 14 Apr 2025 10:58  Patient On (Oxygen Delivery Method): nasal cannula  O2 Flow (L/min): 2    PHYSICAL EXAM:  CONSTITUTIONAL: Well groomed, no apparent distress, sitting in bed comfortably   EYES: PERRLA and symmetric, EOMI, No conjunctival or scleral injection, non-icteric  ENMT: Oral mucosa with moist membranes.  NECK: symmetric and without tracheal deviation   RESP: No respiratory distress, no use of accessory muscles; on 2lNC   CV:  no JVD; no peripheral edema  GI: no rebound, no guarding  MSK: No digital clubbing or cyanosis  SKIN: No rashes or ulcers noted  PSYCH: Appropriate insight/judgment; A+O x 4, mood and affect appropriate, recent/remote memory intact

## 2025-04-08 NOTE — DISCHARGE NOTE PROVIDER - CARE PROVIDER_API CALL
Jude Alex  Interventional Cardiology  [address] [facility], [address]  [place] [address]  Phone: [phone]  Fax: [phone]  Follow Up Time: 1 week   Jude Alex  Interventional Cardiology  [address] [facility], [address]  [place] [address]  Phone: [phone]  Fax: [phone]  Follow Up Time: 1 week    David Ramos  Cardiac Electrophysiology  [address] [place] [id number]  Phone: [phone]  Fax: [phone]  Follow Up Time: 2 weeks

## 2025-04-08 NOTE — ED PROVIDER NOTE - PHYSICAL EXAMINATION
Gen: appears uncomfortable  Head: NC/AT  Neck: trachea midline  Card: tachy  Resp:  CTAB  Abd: soft, non-distended, non-tender  Ext: no deformities  Neuro: A&Ox3, LANDA spontaneously, sensation intact and symmetrical b/l extremities, no facial droop, no slurred speech, EOMI  Skin:  Warm and dry as visualized  Psych:  Normal affect and mood

## 2025-04-08 NOTE — RESPIRATORY CARE EMERGENCY NOTE - CAC RESPIRATORY COMMENTS
responded to code blue, patient recovered before I got to room, placed pt on 4L nc.  no further respiratory interventions needed at this time.

## 2025-04-08 NOTE — H&P ADULT - HISTORY OF PRESENT ILLNESS
89F who presented with chest discomfort. The patient reported that she was in her usual state of health until this morning when she developed nausea and chest discomfort. The patient reported the chest discomfort as sharp and located in the right side of her chest. On presentation, the patient was noted to have atrial fibrillation with aberrancy. The patient then became pulseless and was found to have Torsades and ventricular fibrillation arrest. Cardioversion was performed along with a brief course of CPR with return of spontaneous circulation. Magnesium was administered and the patient was noted to have regained consciousness. The patient denied any history of arrhythmia in the past and was unaware of any aggravating factors. She denied any recent illnesses, fevers, cough, or sick contacts.

## 2025-04-08 NOTE — ED PROVIDER NOTE - OBJECTIVE STATEMENT
89 y/o F pt w/ PMHx of HTN, HLD, hypothyroid presenting w/ chest pain. found to be in irregular tachycardia, afib w/ abberancy versus intermittent ventricular beats. given lopressor 5mg IV x1 without response. 15 min following pt developed TdP into vfib. Unable to obtain pulse. pt was defibrillated w/ 200 joules immediately and about 15 seconds of cpr before patient began exhibiting purposeful movement. repeat ekg showing sinus rhythm. VSS. magnesium IV given.

## 2025-04-08 NOTE — H&P ADULT - NEGATIVE GASTROINTESTINAL SYMPTOMS
Additional Notes: Patient consent was obtained to proceed with the visit and recommended plan of care after discussion of all risks and benefits, including the risks of COVID-19 exposure.
Detail Level: Simple
no vomiting/no diarrhea

## 2025-04-08 NOTE — DISCHARGE NOTE PROVIDER - NSDCCPTREATMENT_GEN_ALL_CORE_FT
PRINCIPAL PROCEDURE  Procedure: Left heart catheterization  Findings and Treatment: Patient s/p LHC on 04/08/25, revealed 95% stenosis of Ramus, placed 1 stent to ramus artery  Restricted use with no heavy lifting of affected arm for 48 hours.  No submerging the arm in water for 48 hours.  You may start showering today.  Call your doctor for any bleeding, swelling, loss of sensation in the hand or fingers, or fingers turning blue.  If heavy bleeding or large lumps form, hold pressure at the spot and come to the Emergency Room.

## 2025-04-08 NOTE — H&P ADULT - NSHPPHYSICALEXAM_GEN_ALL_CORE
Vital Signs Last 24 Hrs  T(C): 36.3 (08 Apr 2025 12:05), Max: 36.3 (08 Apr 2025 12:05)  T(F): 97.4 (08 Apr 2025 12:05), Max: 97.4 (08 Apr 2025 12:05)  HR: 71 (08 Apr 2025 15:09) (71 - 154)  BP: 114/77 (08 Apr 2025 15:09) (114/77 - 156/75)  BP(mean): --  RR: 16 (08 Apr 2025 15:09) (16 - 18)  SpO2: 100% (08 Apr 2025 15:09) (97% - 100%)    Parameters below as of 08 Apr 2025 15:09  Patient On (Oxygen Delivery Method): room air    General appearance: No acute distress, Awake, Alert  HEENT: Normocephalic, Atraumatic, Conjunctiva clear, EOMI  Neck: Supple, No JVD, No tenderness  Lungs: Breath sound equal bilaterally, No wheezes, No rales  Cardiovascular: S1S2, Regular rhythm  Abdomen: Soft, Nontender, Nondistended, No guarding/rebound, Positive bowel sounds  Extremities: No clubbing, No cyanosis, No calf tenderness, Trace pedal edema  Neuro: Strength equal bilaterally, No tremors  Psychiatric: Appropriate mood, Normal affect

## 2025-04-08 NOTE — CONSULT NOTE ADULT - ASSESSMENT
88 y/o female with obesity, HTN, HLD and Copd who woke up and did not feel well, felt weak.  Went to [employer] and started to experience chest discomfort which worsened.  Brought to ER was being evaluated and had a witnessed torsades arrest, cardioverted to atrial fib then converted to NSR.  Feeling ok now but still has chest pain.  No sob

## 2025-04-08 NOTE — ED ADULT NURSE NOTE - NSFALLRISKINTERV_ED_ALL_ED

## 2025-04-08 NOTE — DISCHARGE NOTE PROVIDER - NSDCMRMEDTOKEN_GEN_ALL_CORE_FT
Advair Diskus 250 mcg-50 mcg/inh inhalation powder: 1 inhaled 2 times a day  amLODIPine 5 mg oral tablet: 1 tab(s) orally once a day  levothyroxine 125 mcg (0.125 mg) oral tablet: 1 tab(s) orally once a day  losartan 50 mg oral tablet: 1 tab(s) orally once a day  Spiriva 18 mcg inhalation capsule: 1 cap(s) inhaled once a day   Advair Diskus 250 mcg-50 mcg/inh inhalation powder: 1 inhaled 2 times a day  amiodarone 200 mg oral tablet: 1 tab(s) orally 3 times a day Last done 4/23/25  amiodarone 200 mg oral tablet: 1 tab(s) orally once a day Start date 4/24/25 after completing 200mg TID  amLODIPine 5 mg oral tablet: 1 tab(s) orally once a day  Aspirin EC 81 mg oral delayed release tablet: 1 tab(s) orally once a day  Eliquis 5 mg oral tablet: 1 tab(s) orally 2 times a day  levothyroxine 125 mcg (0.125 mg) oral tablet: 1 tab(s) orally once a day  Lipitor 80 mg oral tablet: 1 tab(s) orally once a day (at bedtime)  Metoprolol Tartrate 25 mg oral tablet: 1 tab(s) orally 2 times a day  Plavix 75 mg oral tablet: 1 tab(s) orally once a day  Spiriva 18 mcg inhalation capsule: 1 cap(s) inhaled once a day

## 2025-04-08 NOTE — ED ADULT NURSE NOTE - CHIEF COMPLAINT QUOTE
Pt presents to ED c/o chest pain. Pt has PMHx COPD (not on home O2). C/o pain to left side of her chest that started today while she was in [employer]. EKG in progress.

## 2025-04-09 LAB
A1C WITH ESTIMATED AVERAGE GLUCOSE RESULT: 5.1 % — SIGNIFICANT CHANGE UP (ref 4–5.6)
ALBUMIN SERPL ELPH-MCNC: 3.5 G/DL — SIGNIFICANT CHANGE UP (ref 3.3–5.2)
ALP SERPL-CCNC: 57 U/L — SIGNIFICANT CHANGE UP (ref 40–120)
ALT FLD-CCNC: 40 U/L — HIGH
ANION GAP SERPL CALC-SCNC: 13 MMOL/L — SIGNIFICANT CHANGE UP (ref 5–17)
APTT BLD: 28.5 SEC — SIGNIFICANT CHANGE UP (ref 24.5–35.6)
AST SERPL-CCNC: 173 U/L — HIGH
BILIRUB SERPL-MCNC: 0.6 MG/DL — SIGNIFICANT CHANGE UP (ref 0.4–2)
BUN SERPL-MCNC: 25 MG/DL — HIGH (ref 8–20)
CALCIUM SERPL-MCNC: 8.7 MG/DL — SIGNIFICANT CHANGE UP (ref 8.4–10.5)
CHLORIDE SERPL-SCNC: 101 MMOL/L — SIGNIFICANT CHANGE UP (ref 96–108)
CHOLEST SERPL-MCNC: 199 MG/DL — SIGNIFICANT CHANGE UP
CO2 SERPL-SCNC: 24 MMOL/L — SIGNIFICANT CHANGE UP (ref 22–29)
CREAT SERPL-MCNC: 0.93 MG/DL — SIGNIFICANT CHANGE UP (ref 0.5–1.3)
EGFR: 59 ML/MIN/1.73M2 — LOW
EGFR: 59 ML/MIN/1.73M2 — LOW
ESTIMATED AVERAGE GLUCOSE: 100 MG/DL — SIGNIFICANT CHANGE UP (ref 68–114)
GLUCOSE SERPL-MCNC: 90 MG/DL — SIGNIFICANT CHANGE UP (ref 70–99)
HCT VFR BLD CALC: 40.7 % — SIGNIFICANT CHANGE UP (ref 34.5–45)
HCT VFR BLD CALC: 41.2 % — SIGNIFICANT CHANGE UP (ref 34.5–45)
HDLC SERPL-MCNC: 95 MG/DL — SIGNIFICANT CHANGE UP
HGB BLD-MCNC: 12.8 G/DL — SIGNIFICANT CHANGE UP (ref 11.5–15.5)
HGB BLD-MCNC: 12.9 G/DL — SIGNIFICANT CHANGE UP (ref 11.5–15.5)
LDLC SERPL-MCNC: 86 MG/DL — SIGNIFICANT CHANGE UP
LIPID PNL WITH DIRECT LDL SERPL: 86 MG/DL — SIGNIFICANT CHANGE UP
MAGNESIUM SERPL-MCNC: 2.3 MG/DL — SIGNIFICANT CHANGE UP (ref 1.6–2.6)
MCHC RBC-ENTMCNC: 31.1 PG — SIGNIFICANT CHANGE UP (ref 27–34)
MCHC RBC-ENTMCNC: 31.3 G/DL — LOW (ref 32–36)
MCHC RBC-ENTMCNC: 31.4 G/DL — LOW (ref 32–36)
MCHC RBC-ENTMCNC: 31.5 PG — SIGNIFICANT CHANGE UP (ref 27–34)
MCV RBC AUTO: 100.2 FL — HIGH (ref 80–100)
MCV RBC AUTO: 99.3 FL — SIGNIFICANT CHANGE UP (ref 80–100)
MRSA PCR RESULT.: SIGNIFICANT CHANGE UP
NONHDLC SERPL-MCNC: 104 MG/DL — SIGNIFICANT CHANGE UP
NRBC # BLD AUTO: 0 K/UL — SIGNIFICANT CHANGE UP (ref 0–0)
NRBC # BLD AUTO: 0 K/UL — SIGNIFICANT CHANGE UP (ref 0–0)
NRBC # FLD: 0 K/UL — SIGNIFICANT CHANGE UP (ref 0–0)
NRBC # FLD: 0 K/UL — SIGNIFICANT CHANGE UP (ref 0–0)
NRBC BLD AUTO-RTO: 0 /100 WBCS — SIGNIFICANT CHANGE UP (ref 0–0)
NRBC BLD AUTO-RTO: 0 /100 WBCS — SIGNIFICANT CHANGE UP (ref 0–0)
PLATELET # BLD AUTO: 195 K/UL — SIGNIFICANT CHANGE UP (ref 150–400)
PLATELET # BLD AUTO: 197 K/UL — SIGNIFICANT CHANGE UP (ref 150–400)
PMV BLD: 11 FL — SIGNIFICANT CHANGE UP (ref 7–13)
PMV BLD: 11 FL — SIGNIFICANT CHANGE UP (ref 7–13)
POTASSIUM SERPL-MCNC: 4.1 MMOL/L — SIGNIFICANT CHANGE UP (ref 3.5–5.3)
POTASSIUM SERPL-SCNC: 4.1 MMOL/L — SIGNIFICANT CHANGE UP (ref 3.5–5.3)
PROT SERPL-MCNC: 6 G/DL — LOW (ref 6.6–8.7)
RBC # BLD: 4.06 M/UL — SIGNIFICANT CHANGE UP (ref 3.8–5.2)
RBC # BLD: 4.15 M/UL — SIGNIFICANT CHANGE UP (ref 3.8–5.2)
RBC # FLD: 12.8 % — SIGNIFICANT CHANGE UP (ref 10.3–14.5)
RBC # FLD: 12.9 % — SIGNIFICANT CHANGE UP (ref 10.3–14.5)
S AUREUS DNA NOSE QL NAA+PROBE: SIGNIFICANT CHANGE UP
SODIUM SERPL-SCNC: 138 MMOL/L — SIGNIFICANT CHANGE UP (ref 135–145)
TRIGL SERPL-MCNC: 104 MG/DL — SIGNIFICANT CHANGE UP
WBC # BLD: 14.54 K/UL — HIGH (ref 3.8–10.5)
WBC # BLD: 16.08 K/UL — HIGH (ref 3.8–10.5)
WBC # FLD AUTO: 14.54 K/UL — HIGH (ref 3.8–10.5)
WBC # FLD AUTO: 16.08 K/UL — HIGH (ref 3.8–10.5)

## 2025-04-09 PROCEDURE — 93010 ELECTROCARDIOGRAM REPORT: CPT

## 2025-04-09 PROCEDURE — 99233 SBSQ HOSP IP/OBS HIGH 50: CPT | Mod: 25

## 2025-04-09 PROCEDURE — 99232 SBSQ HOSP IP/OBS MODERATE 35: CPT

## 2025-04-09 PROCEDURE — 99233 SBSQ HOSP IP/OBS HIGH 50: CPT

## 2025-04-09 RX ORDER — AMIODARONE HYDROCHLORIDE 50 MG/ML
200 INJECTION, SOLUTION INTRAVENOUS EVERY 8 HOURS
Refills: 0 | Status: DISCONTINUED | OUTPATIENT
Start: 2025-04-09 | End: 2025-04-14

## 2025-04-09 RX ORDER — ALBUTEROL SULFATE 2.5 MG/3ML
2 VIAL, NEBULIZER (ML) INHALATION EVERY 6 HOURS
Refills: 0 | Status: DISCONTINUED | OUTPATIENT
Start: 2025-04-09 | End: 2025-04-14

## 2025-04-09 RX ORDER — AMIODARONE HYDROCHLORIDE 50 MG/ML
0.5 INJECTION, SOLUTION INTRAVENOUS
Qty: 450 | Refills: 0 | Status: DISCONTINUED | OUTPATIENT
Start: 2025-04-09 | End: 2025-04-10

## 2025-04-09 RX ORDER — METOPROLOL SUCCINATE 50 MG/1
25 TABLET, EXTENDED RELEASE ORAL
Refills: 0 | Status: DISCONTINUED | OUTPATIENT
Start: 2025-04-09 | End: 2025-04-10

## 2025-04-09 RX ORDER — METOPROLOL SUCCINATE 50 MG/1
25 TABLET, EXTENDED RELEASE ORAL
Refills: 0 | Status: DISCONTINUED | OUTPATIENT
Start: 2025-04-09 | End: 2025-04-09

## 2025-04-09 RX ORDER — AMIODARONE HYDROCHLORIDE 50 MG/ML
200 INJECTION, SOLUTION INTRAVENOUS DAILY
Refills: 0 | Status: CANCELLED | OUTPATIENT
Start: 2025-04-23 | End: 2025-04-14

## 2025-04-09 RX ORDER — ACETAMINOPHEN 500 MG/5ML
650 LIQUID (ML) ORAL EVERY 6 HOURS
Refills: 0 | Status: DISCONTINUED | OUTPATIENT
Start: 2025-04-09 | End: 2025-04-14

## 2025-04-09 RX ORDER — ONDANSETRON HCL/PF 4 MG/2 ML
4 VIAL (ML) INJECTION EVERY 6 HOURS
Refills: 0 | Status: DISCONTINUED | OUTPATIENT
Start: 2025-04-09 | End: 2025-04-14

## 2025-04-09 RX ADMIN — AMLODIPINE BESYLATE 5 MILLIGRAM(S): 10 TABLET ORAL at 06:04

## 2025-04-09 RX ADMIN — AMIODARONE HYDROCHLORIDE 200 MILLIGRAM(S): 50 INJECTION, SOLUTION INTRAVENOUS at 16:01

## 2025-04-09 RX ADMIN — CLOPIDOGREL BISULFATE 75 MILLIGRAM(S): 75 TABLET, FILM COATED ORAL at 12:22

## 2025-04-09 RX ADMIN — Medication 81 MILLIGRAM(S): at 12:22

## 2025-04-09 RX ADMIN — Medication 125 MICROGRAM(S): at 06:04

## 2025-04-09 RX ADMIN — Medication 10 MILLIGRAM(S): at 18:36

## 2025-04-09 RX ADMIN — Medication 1 APPLICATION(S): at 06:25

## 2025-04-09 RX ADMIN — Medication 1 DOSE(S): at 20:44

## 2025-04-09 RX ADMIN — APIXABAN 5 MILLIGRAM(S): 2.5 TABLET, FILM COATED ORAL at 17:02

## 2025-04-09 RX ADMIN — APIXABAN 5 MILLIGRAM(S): 2.5 TABLET, FILM COATED ORAL at 06:04

## 2025-04-09 RX ADMIN — METOPROLOL SUCCINATE 25 MILLIGRAM(S): 50 TABLET, EXTENDED RELEASE ORAL at 12:22

## 2025-04-09 RX ADMIN — LOSARTAN POTASSIUM 50 MILLIGRAM(S): 100 TABLET, FILM COATED ORAL at 06:04

## 2025-04-09 RX ADMIN — TIOTROPIUM BROMIDE INHALATION SPRAY 2 PUFF(S): 3.12 SPRAY, METERED RESPIRATORY (INHALATION) at 09:27

## 2025-04-09 RX ADMIN — Medication 1 DOSE(S): at 09:26

## 2025-04-09 RX ADMIN — ATORVASTATIN CALCIUM 80 MILLIGRAM(S): 80 TABLET, FILM COATED ORAL at 22:37

## 2025-04-09 NOTE — PROGRESS NOTE ADULT - PROBLEM SELECTOR PLAN 2
Patient with elevated trops 64 on admission  S/P LHC - CRISTINA x 1 to Ramus  Right radial precautions:  No lifting > 10 pounds, no submerging, and no driving x 3 days  Seen by IC today  DAPT:  Asprin 81mg po daily and Plavix 75mg po daily  Due to AFib - ct with Triple Therapy:  with Eliquis 5mg po bid, Plavix 75mg po daily and  aspirin 81 mg po daily. In 2 weeks on April 23rd, patient to stop aspirin, but must remain on Plavix and Eliquis.  Continue Lipitor 80mg po qhs  CT losartan 50mg po daily  As discussed with EP will start lopressor for rate control.    Life style modifications:  Diet/ exercise/ medication compliance and cardiac rehab discussed.    Cardio following in patient

## 2025-04-09 NOTE — PROGRESS NOTE ADULT - SUBJECTIVE AND OBJECTIVE BOX
Ventricular fibrillation    HPI:  89F who presented with chest discomfort. The patient reported that she was in her usual state of health until this morning when she developed nausea and chest discomfort. The patient reported the chest discomfort as sharp and located in the right side of her chest. On presentation, the patient was noted to have atrial fibrillation with aberrancy. The patient then became pulseless and was found to have Torsades and ventricular fibrillation arrest. Cardioversion was performed along with a brief course of CPR with return of spontaneous circulation. Magnesium was administered and the patient was noted to have regained consciousness. The patient denied any history of arrhythmia in the past and was unaware of any aggravating factors. She denied any recent illnesses, fevers, cough, or sick contacts. (08 Apr 2025 18:29)    Interval History:  Patient was seen and examined at bedside around 10 am.  Feels better.   Denies chest pain, palpitations, shortness of breath, headache, dizziness, visual symptoms, nausea, vomiting or abdominal pain.    ROS:  As per interval history otherwise unremarkable.    PHYSICAL EXAM:  Vital Signs  T(C): 36.8 (09 Apr 2025 07:43), Max: 37.1 (09 Apr 2025 04:06)  T(F): 98.2 (09 Apr 2025 07:43), Max: 98.8 (09 Apr 2025 04:06)  HR: 76 (09 Apr 2025 16:00) (74 - 190)  BP: 163/55 (09 Apr 2025 16:00) (128/99 - 182/77)  BP(mean): 83 (09 Apr 2025 16:00) (80 - 109)  RR: 18 (09 Apr 2025 16:00) (16 - 20)  SpO2: 98% (09 Apr 2025 16:00) (96% - 100%)  Parameters below as of 09 Apr 2025 16:00  Patient On (Oxygen Delivery Method): room air  General: Elderly female sitting in bed comfortably. No acute distress  HEENT: EOMI. Clear conjunctivae. Moist mucus membrane  Neck: Supple.   Chest: Good air entry. No wheezing, rales or rhonchi.   Heart: S1 & S2 with RRR.   Abdomen: Non distended. Soft. Non-tender. + BS  Ext: No pedal edema. No calf tenderness   Neuro: Awake and alert. No focal deficit. Speech clear.   Skin: Warm and Dry  Psychiatry: Normal mood and affect    I&O's Summary    08 Apr 2025 07:01  -  09 Apr 2025 07:00  --------------------------------------------------------  IN: 350.3 mL / OUT: 400 mL / NET: -49.7 mL    09 Apr 2025 07:01  -  09 Apr 2025 16:09  --------------------------------------------------------  IN: 711 mL / OUT: 0 mL / NET: 711 mL    LABS:                        12.9   14.54 )-----------( 195      ( 09 Apr 2025 05:21 )             41.2     04-09    138  |  101  |  25.0[H]  ----------------------------<  90  4.1   |  24.0  |  0.93    Ca    8.7      09 Apr 2025 05:21  Mg     2.3     04-09    TPro  6.0[L]  /  Alb  3.5  /  TBili  0.6  /  DBili  x   /  AST  173[H]  /  ALT  40[H]  /  AlkPhos  57  04-09    PT/INR - ( 08 Apr 2025 12:45 )   PT: 11.6 sec;   INR: 1.03 ratio       PTT - ( 09 Apr 2025 01:11 )  PTT:28.5 sec  Urinalysis Basic - ( 09 Apr 2025 05:21 )    Color: x / Appearance: x / SG: x / pH: x  Gluc: 90 mg/dL / Ketone: x  / Bili: x / Urobili: x   Blood: x / Protein: x / Nitrite: x   Leuk Esterase: x / RBC: x / WBC x   Sq Epi: x / Non Sq Epi: x / Bacteria: x    RADIOLOGY & ADDITIONAL STUDIES:  Reviewed     MEDICATIONS  (STANDING):  aMIOdarone    Tablet 200 milliGRAM(s) Oral every 8 hours  aMIOdarone Infusion 0.5 mG/Min (16.7 mL/Hr) IV Continuous <Continuous>  amLODIPine   Tablet 5 milliGRAM(s) Oral daily  apixaban 5 milliGRAM(s) Oral two times a day  aspirin enteric coated 81 milliGRAM(s) Oral daily  atorvastatin 80 milliGRAM(s) Oral at bedtime  chlorhexidine 2% Cloths 1 Application(s) Topical <User Schedule>  clopidogrel Tablet 75 milliGRAM(s) Oral daily  fluticasone propionate/ salmeterol 250-50 MICROgram(s) Diskus 1 Dose(s) Inhalation two times a day  influenza  Vaccine (HIGH DOSE) 0.5 milliLiter(s) IntraMuscular once  levothyroxine 125 MICROGram(s) Oral daily  losartan 50 milliGRAM(s) Oral daily  metoprolol tartrate 25 milliGRAM(s) Oral two times a day  pantoprazole    Tablet 40 milliGRAM(s) Oral daily  sodium chloride 0.9% Bolus 250 milliLiter(s) IV Bolus once  tiotropium 2.5 MICROgram(s) Inhaler 2 Puff(s) Inhalation daily    MEDICATIONS  (PRN):  hydrALAZINE Injectable 10 milliGRAM(s) IV Push every 6 hours PRN SBP > 160

## 2025-04-09 NOTE — PHYSICAL THERAPY INITIAL EVALUATION ADULT - PRECAUTIONS/LIMITATIONS, REHAB EVAL
Right radial precautions:  No lifting > 10 pounds, no submerging, and no driving x 3 days/cardiac precautions

## 2025-04-09 NOTE — PROGRESS NOTE ADULT - ASSESSMENT
POD 1 LHC via RRA with Dr. 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 Patient denies chest pain, chest pressure, shortness of breath at rest, palpitations, dizziness, arm pain, jaw pain or nausea and vomiting.     Right wrist dressing removed. site c/d/i without active bleeding or wrist hematoma. RU/ right hand remains acyanotic; warm to touch; motor/sensory function intact; 2+ right radial pulse.     PLAN:  -pending formal cath report  -post cardiac cath management per protocol  -Right radial  precautions discussed with patient.   -Repeat ECG if any clinical indication or change on tele  -Continue current medical therapy  -Triple Therapy with Eliquis 5mg po bid, Plavix 75mg po daily and  aspirin 81 mg po daily. In 2 weeks on april 23rd, patient to stop aspirin, but must remain on plavix and eliquis.   -Patient remains on IV amio infusion. Antiarrythmics per cardiology/ EP team.   -Cont statin therapy with Lipitor 80mg po qHS   -Educated regarding strict adherence with DAPT. On aspirin 81mg daily/ plavix 75mg daily. In 2 weeks on 4/23, patient to stop aspirin, but must remain on plavix 75mg daily and eliquis 5mg BID.    -Educated regarding post procedure management and care  -Discussed the importance of RF modification  -Cardiac rehab info provided/referral and communication to cardiac rehab completed  -F/U outpt in 1-2 weeks with Cardiologist Dr. Jude Alex. Follow up appt made for Monday april 14th at 1 pm.   -DISPO: Further plan per general cardiology team and EP team.   PLEASE DO NOT ADMINISTER BLOOD TRANSFUSION ON THIS PATIENT WITHIN 72 HOURS OF CARDIAC CATHETERIZATION (UNLESS PATIENT IS HEMODYNAMICALLY UNSTABLE OR ACTIVELY BLEEDING) WITHOUT FIRST DISCUSSING WITH INTERVENTIONALIST DR. Jude Alex ON TEAMS OR CALLING CATH LAB HOLDING -[phone].       POD 1 LHC via RRA with Dr. 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 Patient denies chest pain, chest pressure, shortness of breath at rest, palpitations, dizziness, arm pain, jaw pain or nausea and vomiting.     Right wrist dressing removed. site c/d/i without active bleeding or wrist hematoma. RU/ right hand remains acyanotic; warm to touch; motor/sensory function intact; 2+ right radial pulse.     PLAN:  -pending formal cath report  -post cardiac cath management per protocol  -Right radial  precautions discussed with patient.   -Repeat ECG if any clinical indication or change on tele  -Continue current medical therapy  -Triple Therapy with Eliquis 5mg po bid, Plavix 75mg po daily and  aspirin 81 mg po daily. In 2 weeks on april 23rd, patient to stop aspirin, but must remain on plavix and eliquis.   -Patient remains on IV amio infusion. Antiarrythmics per cardiology/ EP team.   -Cont statin therapy with Lipitor 80mg po qHS   -start metoprolol 25mg BID  -Educated regarding strict adherence with DAPT. On aspirin 81mg daily/ plavix 75mg daily. In 2 weeks on 4/23, patient to stop aspirin, but must remain on plavix 75mg daily and eliquis 5mg BID.    -Educated regarding post procedure management and care  -Discussed the importance of RF modification  -Cardiac rehab info provided/referral and communication to cardiac rehab completed  -F/U outpt in 1-2 weeks with Cardiologist Dr. Jude Alex. Follow up appt made for Monday april 14th at 1 pm.   -DISPO: Further plan per general cardiology team and EP team.   PLEASE DO NOT ADMINISTER BLOOD TRANSFUSION ON THIS PATIENT WITHIN 72 HOURS OF CARDIAC CATHETERIZATION (UNLESS PATIENT IS HEMODYNAMICALLY UNSTABLE OR ACTIVELY BLEEDING) WITHOUT FIRST DISCUSSING WITH INTERVENTIONALIST DR. Jude Alex ON TEAMS OR CALLING CATH LAB HOLDING -[phone].       POD 1 LHC via RRA with Dr. 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 Patient denies chest pain, chest pressure, shortness of breath at rest, palpitations, dizziness, arm pain, jaw pain or nausea and vomiting.     Right wrist dressing removed. site c/d/i without active bleeding or wrist hematoma. RU/ right hand remains acyanotic; warm to touch; motor/sensory function intact; 2+ right radial pulse.     PLAN:  -pending formal cath report  -post cardiac cath management per protocol  -Right radial  precautions discussed with patient.   -Repeat ECG if any clinical indication or change on tele  -Continue current medical therapy  -Triple Therapy with Eliquis 5mg po bid, Plavix 75mg po daily and  aspirin 81 mg po daily. In 2 weeks on april 23rd, patient to stop aspirin, but must remain on plavix and eliquis.   -Patient remains on IV amio infusion. Antiarrythmics per cardiology/ EP team.   -Cont statin therapy with Lipitor 80mg po qHS   -start metoprolol 25mg BID  -Educated regarding strict adherence with DAPT. On aspirin 81mg daily/ plavix 75mg daily. In 2 weeks on 4/23, patient to stop aspirin, but must remain on plavix 75mg daily and eliquis 5mg BID.    -Educated regarding post procedure management and care  -Discussed the importance of RF modification  -Cardiac rehab info provided/referral and communication to cardiac rehab completed  -F/U outpt in 1-2 weeks with Cardiologist Dr. Jude Alex. Follow up appt made for Monday april 14th at 1 pm.   -DISPO: Further plan per general cardiology team and EP team.   -patient will need life vest prior to discharge home  PLEASE DO NOT ADMINISTER BLOOD TRANSFUSION ON THIS PATIENT WITHIN 72 HOURS OF CARDIAC CATHETERIZATION (UNLESS PATIENT IS HEMODYNAMICALLY UNSTABLE OR ACTIVELY BLEEDING) WITHOUT FIRST DISCUSSING WITH INTERVENTIONALIST DR. Jude Alex ON TEAMS OR CALLING CATH LAB HOLDING -[phone].

## 2025-04-09 NOTE — PROGRESS NOTE ADULT - SUBJECTIVE AND OBJECTIVE BOX
[facility]                                                         CARDIOLOGY AT [place] [address]                                                         Telephone: [phone]. Fax:[phone]                                                          INTERVENTIONAL CARDIOLOGY PROGRESS NOTE    Reason for follow up: s/p PCI and 1 CRISTINA to 95% RAMUS STENOSIS (ASHLIE FRONTIER 2.75MMX 18MM) (pending official report). Patient denies chest pain, chest pressure, shortness of breath at rest, palpitations, dizziness, arm pain, jaw pain or nausea and vomiting.     Procedure: [facility] with PCI and 1 CRISTINA to Ramus 4/8/25  Procedure Date: 4/8/25  Procedural Interventionalist: Dr. Alex  Mechanical Support Device, Settings: n/a    Review of symptoms:   Cardiac:  No chest pain. No dyspnea. No palpitations.  Respiratory: no cough. No dyspnea  Gastrointestinal: No diarrhea. No abdominal pain. No bleeding.   Neuro: No focal neuro complaints.    Vitals:  T(C): 36.8 (04-09-25 @ 07:43), Max: 37.1 (04-09-25 @ 04:06)  HR: 90 (04-09-25 @ 08:00) (71 - 190)  BP: 182/77 (04-09-25 @ 07:58) (114/77 - 182/77)  RR: 18 (04-09-25 @ 06:00) (16 - 20)  SpO2: 99% (04-09-25 @ 06:00) (97% - 100%)  Wt(kg): --  I&O's Summary    08 Apr 2025 07:01  -  09 Apr 2025 07:00  --------------------------------------------------------  IN: 350.3 mL / OUT: 400 mL / NET: -49.7 mL    09 Apr 2025 07:01  -  09 Apr 2025 09:18  --------------------------------------------------------  IN: 83.4 mL / OUT: 0 mL / NET: 83.4 mL      Weight (kg): 63.5 (04-08 @ 13:37)    PHYSICAL EXAM:  Appearance: Comfortable. No acute distress  HEENT:  Atraumatic. Normocephalic.  Normal oral mucosa  Neurologic: A & O x 3, no gross focal deficits.  Cardiovascular: RRR S1 S2, No murmur, no rubs/gallops. No JVD  Respiratory: breath sounds diminished posteriorly  Gastrointestinal:  Soft, Non-tender, + BS  Lower Extremities: 2+ Peripheral Pulses, No clubbing, cyanosis, or edema  Psychiatry: Patient is calm. No agitation.   Skin: warm and dry. right wrist dressing removed. site c/d/i without active bleeding or wrist hematoma. RUE/ right hand remains acyanotic; warm to touch; motor/sensory function intact; 2+ right radial pulse    CURRENT CARDIAC MEDICATIONS:  aMIOdarone Infusion 0.5 mG/Min IV Continuous <Continuous>  amLODIPine   Tablet 5 milliGRAM(s) Oral daily  hydrALAZINE Injectable 10 milliGRAM(s) IV Push every 6 hours PRN  losartan 50 milliGRAM(s) Oral daily      CURRENT OTHER MEDICATIONS:  fluticasone propionate/ salmeterol 250-50 MICROgram(s) Diskus 1 Dose(s) Inhalation two times a day  tiotropium 2.5 MICROgram(s) Inhaler 2 Puff(s) Inhalation daily  atorvastatin 80 milliGRAM(s) Oral at bedtime  levothyroxine 125 MICROGram(s) Oral daily  apixaban 5 milliGRAM(s) Oral two times a day  aspirin enteric coated 81 milliGRAM(s) Oral daily  chlorhexidine 2% Cloths 1 Application(s) Topical <User Schedule>  clopidogrel Tablet 75 milliGRAM(s) Oral daily  influenza  Vaccine (HIGH DOSE) 0.5 milliLiter(s) IntraMuscular once  sodium chloride 0.9% Bolus 250 milliLiter(s) IV Bolus once, Stop order after: 1 Doses      LABS:	 	                            12.9   14.54 )-----------( 195      ( 09 Apr 2025 05:21 )             41.2     04-09    138  |  101  |  25.0[H]  ----------------------------<  90  4.1   |  24.0  |  0.93    Ca    8.7      09 Apr 2025 05:21  Mg     2.3     04-09    TPro  6.0[L]  /  Alb  3.5  /  TBili  0.6  /  DBili  x   /  AST  173[H]  /  ALT  40[H]  /  AlkPhos  57  04-09    PT/INR/PTT ( 09 Apr 2025 01:11 )                       :                       :      X            :       28.5                  .        .                   .              .           .       X           .                                       Lipid Profile: Date: 04-09 @ 05:21  Total cholesterol 199; Direct LDL: --; HDL: 95; Triglycerides:104    ECG: SR PVCs with no acute ischemic changes from prior ECG. inferior and anterolateral ST changes similar to prior ECG    DIAGNOSTIC TESTING:  [ X] Echocardiogram: < from: TTE W or WO Ultrasound Enhancing Agent (04.08.25 @ 13:39) >  CONCLUSIONS:      1. Left ventricular cavity is normal in size. Left ventricular wall thickness is normal. Left ventricular systolic function is normal with an ejection fraction visually estimated at 60 to 65 %.   2. There is moderate (grade 2) left ventricular diastolic dysfunction, with elevated left ventricular filling pressure.   3. There is normal LV mass and normal geometry.   4. Normal right ventricular cavity size and normal right ventricular systolic function.   5. Left atrium is moderately dilated.   6. The right atrium is normal in size.   7. Trileaflet aortic valve. There is moderate calcification of the aortic valve leaflets. Mild to moderate aortic stenosis.   8. Mild aortic regurgitation.   9. The peak transaortic velocity is 2.00 m/s, peak transaortic gradient is 16.0 mmHg and mean transaortic gradient is 7.9 mmHg with an LVOT/aortic valve VTI ratio of 0.45. The aortic valve acceleration time is 74 msec. The effective orifice area is estimated at 1.52 cm² by the continuity equation.  10. Moderate mitral valve leaflet calcification.  11. There is severe calcification of the mitral valve annulus.  12. Trace mitral regurgitation.  13. Mild tricuspid regurgitation.  14. Estimated pulmonary artery systolic pressure is 30 mmHg.      < end of copied text >                                                                  [facility]                                                         CARDIOLOGY AT [place] [address]                                                         Telephone: [phone]. Fax:[phone]                                                          INTERVENTIONAL CARDIOLOGY PROGRESS NOTE    Reason for follow up: s/p PCI and 1 CRISTINA to 95% RAMUS STENOSIS (ASHLIE FRONTIER 2.75MMX 18MM) (pending official report). Patient denies chest pain, chest pressure, shortness of breath at rest, palpitations, dizziness, arm pain, jaw pain or nausea and vomiting.   Tele overnight with episodes of NSVT and SVT. patient asymptomatic    Procedure: [facility] with PCI and 1 CRISTINA to Ramus 4/8/25  Procedure Date: 4/8/25  Procedural Interventionalist: Dr. Blackman  Mechanical Support Device, Settings: n/a    Review of symptoms:   Cardiac:  No chest pain. No dyspnea. No palpitations.  Respiratory: no cough. No dyspnea  Gastrointestinal: No diarrhea. No abdominal pain. No bleeding.   Neuro: No focal neuro complaints.    Vitals:  T(C): 36.8 (04-09-25 @ 07:43), Max: 37.1 (04-09-25 @ 04:06)  HR: 90 (04-09-25 @ 08:00) (71 - 190)  BP: 182/77 (04-09-25 @ 07:58) (114/77 - 182/77)  RR: 18 (04-09-25 @ 06:00) (16 - 20)  SpO2: 99% (04-09-25 @ 06:00) (97% - 100%)  Wt(kg): --  I&O's Summary    08 Apr 2025 07:01  -  09 Apr 2025 07:00  --------------------------------------------------------  IN: 350.3 mL / OUT: 400 mL / NET: -49.7 mL    09 Apr 2025 07:01  -  09 Apr 2025 09:18  --------------------------------------------------------  IN: 83.4 mL / OUT: 0 mL / NET: 83.4 mL      Weight (kg): 63.5 (04-08 @ 13:37)    PHYSICAL EXAM:  Appearance: Comfortable. No acute distress  HEENT:  Atraumatic. Normocephalic.  Normal oral mucosa  Neurologic: A & O x 3, no gross focal deficits.  Cardiovascular: RRR S1 S2, No murmur, no rubs/gallops. No JVD  Respiratory: breath sounds diminished posteriorly  Gastrointestinal:  Soft, Non-tender, + BS  Lower Extremities: 2+ Peripheral Pulses, No clubbing, cyanosis, or edema  Psychiatry: Patient is calm. No agitation.   Skin: warm and dry. right wrist dressing removed. site c/d/i without active bleeding or wrist hematoma. RUE/ right hand remains acyanotic; warm to touch; motor/sensory function intact; 2+ right radial pulse    CURRENT CARDIAC MEDICATIONS:  aMIOdarone Infusion 0.5 mG/Min IV Continuous <Continuous>  amLODIPine   Tablet 5 milliGRAM(s) Oral daily  hydrALAZINE Injectable 10 milliGRAM(s) IV Push every 6 hours PRN  losartan 50 milliGRAM(s) Oral daily      CURRENT OTHER MEDICATIONS:  fluticasone propionate/ salmeterol 250-50 MICROgram(s) Diskus 1 Dose(s) Inhalation two times a day  tiotropium 2.5 MICROgram(s) Inhaler 2 Puff(s) Inhalation daily  atorvastatin 80 milliGRAM(s) Oral at bedtime  levothyroxine 125 MICROGram(s) Oral daily  apixaban 5 milliGRAM(s) Oral two times a day  aspirin enteric coated 81 milliGRAM(s) Oral daily  chlorhexidine 2% Cloths 1 Application(s) Topical <User Schedule>  clopidogrel Tablet 75 milliGRAM(s) Oral daily  influenza  Vaccine (HIGH DOSE) 0.5 milliLiter(s) IntraMuscular once  sodium chloride 0.9% Bolus 250 milliLiter(s) IV Bolus once, Stop order after: 1 Doses      LABS:	 	                            12.9   14.54 )-----------( 195      ( 09 Apr 2025 05:21 )             41.2     04-09    138  |  101  |  25.0[H]  ----------------------------<  90  4.1   |  24.0  |  0.93    Ca    8.7      09 Apr 2025 05:21  Mg     2.3     04-09    TPro  6.0[L]  /  Alb  3.5  /  TBili  0.6  /  DBili  x   /  AST  173[H]  /  ALT  40[H]  /  AlkPhos  57  04-09    PT/INR/PTT ( 09 Apr 2025 01:11 )                       :                       :      X            :       28.5                  .        .                   .              .           .       X           .                                       Lipid Profile: Date: 04-09 @ 05:21  Total cholesterol 199; Direct LDL: --; HDL: 95; Triglycerides:104    ECG: SR PVCs with no acute ischemic changes from prior ECG. inferior and anterolateral ST changes similar to prior ECG reviewed with Dr. riley blackman    DIAGNOSTIC TESTING:  [ X] Echocardiogram: < from: TTE W or WO Ultrasound Enhancing Agent (04.08.25 @ 13:39) >  CONCLUSIONS:      1. Left ventricular cavity is normal in size. Left ventricular wall thickness is normal. Left ventricular systolic function is normal with an ejection fraction visually estimated at 60 to 65 %.   2. There is moderate (grade 2) left ventricular diastolic dysfunction, with elevated left ventricular filling pressure.   3. There is normal LV mass and normal geometry.   4. Normal right ventricular cavity size and normal right ventricular systolic function.   5. Left atrium is moderately dilated.   6. The right atrium is normal in size.   7. Trileaflet aortic valve. There is moderate calcification of the aortic valve leaflets. Mild to moderate aortic stenosis.   8. Mild aortic regurgitation.   9. The peak transaortic velocity is 2.00 m/s, peak transaortic gradient is 16.0 mmHg and mean transaortic gradient is 7.9 mmHg with an LVOT/aortic valve VTI ratio of 0.45. The aortic valve acceleration time is 74 msec. The effective orifice area is estimated at 1.52 cm² by the continuity equation.  10. Moderate mitral valve leaflet calcification.  11. There is severe calcification of the mitral valve annulus.  12. Trace mitral regurgitation.  13. Mild tricuspid regurgitation.  14. Estimated pulmonary artery systolic pressure is 30 mmHg.      < end of copied text >

## 2025-04-09 NOTE — PROGRESS NOTE ADULT - ASSESSMENT
89 y/o female with obesity, HTN, HLD and Copd who woke up and did not feel well, felt weak.  Went to [facility] and started to experience chest discomfort which worsened.  Brought to ER was being evaluated and had a witnessed torsades arrest, cardioverted to atrial fib then converted to NSR.  Feeling ok now but still has chest pain.  No sob

## 2025-04-09 NOTE — PROVIDER CONTACT NOTE (CHANGE IN STATUS NOTIFICATION) - ASSESSMENT
pt went into [place] PAF up to 180's  BP electric taken at this time 174/86(109) HR now   pt asymptomatic,
pt converted back to SR after her episode of PAF, than converted into atrial tachycardia up until the 190's for 3.5 seconds nonsustained nonsymptomatic , than back into SR

## 2025-04-09 NOTE — PROGRESS NOTE ADULT - ASSESSMENT
89F with a history of hypertension, COPD, and hypothyroidism, who presented with chest discomfort. On presentation, the patient was noted to have atrial fibrillation with aberrancy. The patient then became pulseless and was found to have Torsades and ventricular fibrillation arrest. Cardioversion was performed along with a brief course of CPR with return of spontaneous circulation. Magnesium was administered and the patient was noted to have regained consciousness. The patient denied any history of arrhythmia in the past and was unaware of any aggravating factors. She denied any recent illnesses, fevers, cough, or sick contacts.    Cardiac arrest / NSTEMI  - Electrical cardioversion resulting in sinus rhythm  - Noted to have Torsades de pointes and magnesium was supplemented  - s/p [facility] with CRISTINA to Ramus on 4/8  - s/p Heparin Infusion --> now on Eliquis   - Continue Amiodarone load  - Continue Asprin, Plavix, Lipitor (monitor LFTs), Metoprolol and Losartan    - Will need LifeVest prior to d/c   - Cardio / EP follow up noted     New Onset Atrial fibrillation  - Continue Amiodarone and Metoprolol    - Continue Eliquis     COPD  - Not in acute exacerbation  - Albuterol as needed  - Continue on fluticasone/salmeterol and tiotropium    Hypothyroidism  - On levothyroxine    Leukocytosis   - Likely reactive   - Monitor     Transaminitis   - Likely shock liver   - Monitor closely    Dispo: Home likely in 48 hours if remains stable. Needs LifeVest prior to d/c.

## 2025-04-09 NOTE — PROGRESS NOTE ADULT - SUBJECTIVE AND OBJECTIVE BOX
Subjective: Pt underwent PCI to 95% Ramus lesion yesterday afternoon. Pt reports no complaints at time of assessment this AM. Denies chest pain, SOB, palpitations, dizziness.      EKG: Sinus rhythm with intact conduction. NC 120ms. QRS 72.   TELE: SR with intact conduction. Brief episodes of NSVT. Brief episode of SVT at 190bpm.     MEDICATIONS  (STANDING):  aMIOdarone    Tablet 200 milliGRAM(s) Oral every 8 hours  aMIOdarone Infusion 0.5 mG/Min (16.7 mL/Hr) IV Continuous <Continuous>  amLODIPine   Tablet 5 milliGRAM(s) Oral daily  apixaban 5 milliGRAM(s) Oral two times a day  aspirin enteric coated 81 milliGRAM(s) Oral daily  atorvastatin 80 milliGRAM(s) Oral at bedtime  chlorhexidine 2% Cloths 1 Application(s) Topical <User Schedule>  clopidogrel Tablet 75 milliGRAM(s) Oral daily  fluticasone propionate/ salmeterol 250-50 MICROgram(s) Diskus 1 Dose(s) Inhalation two times a day  influenza  Vaccine (HIGH DOSE) 0.5 milliLiter(s) IntraMuscular once  levothyroxine 125 MICROGram(s) Oral daily  losartan 50 milliGRAM(s) Oral daily  metoprolol tartrate 25 milliGRAM(s) Oral two times a day  sodium chloride 0.9% Bolus 250 milliLiter(s) IV Bolus once  tiotropium 2.5 MICROgram(s) Inhaler 2 Puff(s) Inhalation daily    MEDICATIONS  (PRN):  hydrALAZINE Injectable 10 milliGRAM(s) IV Push every 6 hours PRN SBP > 160      Allergies    Allergy Status Unknown    Intolerances        Vital Signs Last 24 Hrs  T(C): 36.8 (09 Apr 2025 07:43), Max: 37.1 (09 Apr 2025 04:06)  T(F): 98.2 (09 Apr 2025 07:43), Max: 98.8 (09 Apr 2025 04:06)  HR: 96 (09 Apr 2025 09:30) (71 - 190)  BP: 182/77 (09 Apr 2025 07:58) (114/77 - 182/77)  BP(mean): 105 (09 Apr 2025 07:58) (80 - 109)  RR: 18 (09 Apr 2025 06:00) (16 - 20)  SpO2: 96% (09 Apr 2025 09:30) (96% - 100%)    Parameters below as of 09 Apr 2025 09:30  Patient On (Oxygen Delivery Method): nasal cannula, 2L        Physical Exam:  Constitutional: NAD  Chest wall: normal in appearance, nontender to palpation  Resp: effort normal, breath sounds clear to auscultation bilaterally  Cardiac: Heart regular rate and rhythm, no murmurs, rubs, or gallops.  No edema.  Neuro: Alert and oriented x 3    LABS:                        12.9   14.54 )-----------( 195      ( 09 Apr 2025 05:21 )             41.2     04-09    138  |  101  |  25.0[H]  ----------------------------<  90  4.1   |  24.0  |  0.93    Ca    8.7      09 Apr 2025 05:21  Mg     2.3     04-09    TPro  6.0[L]  /  Alb  3.5  /  TBili  0.6  /  DBili  x   /  AST  173[H]  /  ALT  40[H]  /  AlkPhos  57  04-09    PT/INR - ( 08 Apr 2025 12:45 )   PT: 11.6 sec;   INR: 1.03 ratio         PTT - ( 09 Apr 2025 01:11 )  PTT:28.5 sec  Urinalysis Basic - ( 09 Apr 2025 05:21 )    Color: x / Appearance: x / SG: x / pH: x  Gluc: 90 mg/dL / Ketone: x  / Bili: x / Urobili: x   Blood: x / Protein: x / Nitrite: x   Leuk Esterase: x / RBC: x / WBC x   Sq Epi: x / Non Sq Epi: x / Bacteria: x        RADIOLOGY & ADDITIONAL TESTS:        < from: TTE W or WO Ultrasound Enhancing Agent (04.08.25 @ 13:39) >  1. Left ventricular cavity is normal in size. Left ventricular wall thickness is normal. Left ventricular systolic function is normal with an ejection fraction visually estimated at 60 to 65 %.   2. There is moderate (grade 2) left ventricular diastolic dysfunction, with elevated left ventricular filling pressure.   3. There is normal LV mass and normal geometry.   4. Normal right ventricular cavity size and normal right ventricular systolic function.   5. Left atrium is moderately dilated.   6. The right atrium is normal in size.   7. Trileaflet aortic valve. There is moderate calcification of the aortic valve leaflets. Mild to moderate aortic stenosis.   8. Mild aortic regurgitation.   9. The peak transaortic velocity is 2.00 m/s, peak transaortic gradient is 16.0 mmHg and mean transaortic gradient is 7.9 mmHg with an LVOT/aortic valve VTI ratio of 0.45. The aortic valve acceleration time is 74 msec. The effective orifice area is estimated at 1.52 cm² by the continuity equation.  10. Moderate mitral valve leaflet calcification.  11. There is severe calcification of the mitral valve annulus.  12. Trace mitral regurgitation.  13. Mild tricuspid regurgitation.  14. Estimated pulmonary artery systolic pressure is 30 mmHg.    < end of copied text >        Assessment:    Pt is a 88 y/o female with HTN, HLD, COPD (2L NC PRN at home), chronic diastolic heart failure, who presents to [facility] with complaints of chest pain. Pt states she woke up with morning with a sharp pain in the R side of her chest, which progressively worsened throughout the day. Pt noted to be in an irregular WCT at time of arrival for which she was treated with IV lopressor. Pt subsequently lost her pulse and went into VF arrest. Pt was urgently cardioverted with 200J x 1 with conversion to sinus rhythm.     Pt underwent PCI to 95% Ramus lesion yesterday afternoon. Telemetry review overnight reveals predominantly sinus rhythm with brief episodes of NSVT and SVT. Pt reports no complaints at time of assessment this AM. Denies chest pain, SOB, palpitations, dizziness.      Recommendations  1) VF arrest  - s/p PCI to 95 Ramus lesion  - Complete 24 hour loading dose of IV amiodarone (1600)  - Start Amiodarone 200mg TID x 14 days following completion of the IV load. Then decrease to 200mg QD thereafter  - TFTs & LFTs should be monitored q 3-6 months while on amiodarone therapy. Anticipate <1 year of amiodarone therapy for this patient; however if > 1 year, patient will need annual fundoscopic exam and PFTs. Patient is advised of associated risks and need for monitoring; all questions answered to pt's expressed understanding.  - Start Metoprolol 25mg Q12HR  - Order placed for Assure WCD  - Maintain telemetry monitoring for another 24 hours    2) Atrial fibrillation  - c/w Eliquis 5mg Q12HR  - c/e Metoprolol and Amiodarone as above  - Outpatient follow up with Dr. Ramos to discuss potential EPS / ablation      Discussed with Dr. Ledezma

## 2025-04-09 NOTE — PROVIDER CONTACT NOTE (CHANGE IN STATUS NOTIFICATION) - ACTION/TREATMENT ORDERED:
Cardio and Ep on board will call them back again   continue amio drip   continue VS Q2
Keep Amio drip running until EP see patient  EKG ordered   LAbs were just taken, so will hold off on them for now

## 2025-04-09 NOTE — PROGRESS NOTE ADULT - SUBJECTIVE AND OBJECTIVE BOX
[facility]                                                         CARDIOLOGY AT [place] [address]                                                         Telephone: [phone]. Fax:[phone]                                                                             PROGRESS NOTE    Reason for follow up:   VF/CAD/Afib   Update:   TELEMETRY:   PAF, SVT noted and ectopy noted - EP following  S/P LHC CRISTINA x 1 to Ramus    Review of symptoms:   Cardiac:  No chest pain. No dyspnea. No palpitations.  Respiratory: no cough. No dyspnea  Gastrointestinal: No diarrhea. No abdominal pain. No bleeding.   Neuro: No focal neuro complaints.    Vitals:  T(C): 36.8 (04-09-25 @ 07:43), Max: 37.1 (04-09-25 @ 04:06)  HR: 96 (04-09-25 @ 09:30) (71 - 190)  BP: 182/77 (04-09-25 @ 07:58) (114/77 - 182/77)  RR: 18 (04-09-25 @ 06:00) (16 - 20)  SpO2: 96% (04-09-25 @ 09:30) (96% - 100%)  I&O's Summary  08 Apr 2025 07:01  -  09 Apr 2025 07:00  --------------------------------------------------------  IN: 350.3 mL / OUT: 400 mL / NET: -49.7 mL  09 Apr 2025 07:01  -  09 Apr 2025 09:55  --------------------------------------------------------  IN: 83.4 mL / OUT: 0 mL / NET: 83.4 mL  Weight (kg): 63.5 (04-08 @ 13:37)    PHYSICAL EXAM:  Appearance: Comfortable. No acute distress  HEENT:  Atraumatic. Normocephalic.  Normal oral mucosa  Neurologic: A & O x 3, no gross focal deficits.  Cardiovascular: Irregular S1 S2, No murmur, no rubs/gallops. No JVD  Respiratory: Lungs clear to auscultation, unlabored   Gastrointestinal:  Soft, Non-tender, + BS  Lower Extremities: + Peripheral Pulses, No clubbing, cyanosis, or edema  Psychiatry: Patient is calm. No agitation.   Skin: warm and dry.  RRA:  Site:  No bleeding, no pain, slight bruising noted, +PP and no edema.      CURRENT CARDIAC MEDICATIONS:  aMIOdarone    Tablet 200 milliGRAM(s) Oral every 8 hours  aMIOdarone Infusion 0.5 mG/Min IV Continuous <Continuous>  amLODIPine   Tablet 5 milliGRAM(s) Oral daily  hydrALAZINE Injectable 10 milliGRAM(s) IV Push every 6 hours PRN  losartan 50 milliGRAM(s) Oral daily  metoprolol tartrate 25 milliGRAM(s) Oral two times a day    CURRENT OTHER MEDICATIONS:  fluticasone propionate/ salmeterol 250-50 MICROgram(s) Diskus 1 Dose(s) Inhalation two times a day  tiotropium 2.5 MICROgram(s) Inhaler 2 Puff(s) Inhalation daily  atorvastatin 80 milliGRAM(s) Oral at bedtime  levothyroxine 125 MICROGram(s) Oral daily  apixaban 5 milliGRAM(s) Oral two times a day  aspirin enteric coated 81 milliGRAM(s) Oral daily  chlorhexidine 2% Cloths 1 Application(s) Topical <User Schedule>  clopidogrel Tablet 75 milliGRAM(s) Oral daily  influenza  Vaccine (HIGH DOSE) 0.5 milliLiter(s) IntraMuscular once  sodium chloride 0.9% Bolus 250 milliLiter(s) IV Bolus once, Stop order after: 1 Doses    LABS:	 	                        12.9   14.54 )-----------( 195      ( 09 Apr 2025 05:21 )             41.2     04-09    138  |  101  |  25.0[H]  ----------------------------<  90  4.1   |  24.0  |  0.93    Ca    8.7      09 Apr 2025 05:21  Mg     2.3     04-09    TPro  6.0[L]  /  Alb  3.5  /  TBili  0.6  /  DBili  x   /  AST  173[H]  /  ALT  40[H]  /  AlkPhos  57  04-09    PT/INR/PTT ( 09 Apr 2025 01:11 )                       :                       :      X            :       28.5                  .        .                   .              .           .       X           .                                       Lipid Profile: Date: 04-09 @ 05:21  Total cholesterol 199; Direct LDL: --; HDL: 95; Triglycerides:104    TELEMETRY:   PAF, SVT noted and ectopy noted.

## 2025-04-10 LAB
ALBUMIN SERPL ELPH-MCNC: 3.3 G/DL — SIGNIFICANT CHANGE UP (ref 3.3–5.2)
ALP SERPL-CCNC: 49 U/L — SIGNIFICANT CHANGE UP (ref 40–120)
ALT FLD-CCNC: 33 U/L — HIGH
ANION GAP SERPL CALC-SCNC: 14 MMOL/L — SIGNIFICANT CHANGE UP (ref 5–17)
AST SERPL-CCNC: 87 U/L — HIGH
BASOPHILS # BLD AUTO: 0.04 K/UL — SIGNIFICANT CHANGE UP (ref 0–0.2)
BASOPHILS NFR BLD AUTO: 0.3 % — SIGNIFICANT CHANGE UP (ref 0–2)
BILIRUB SERPL-MCNC: 0.7 MG/DL — SIGNIFICANT CHANGE UP (ref 0.4–2)
BUN SERPL-MCNC: 23.4 MG/DL — HIGH (ref 8–20)
CALCIUM SERPL-MCNC: 8.7 MG/DL — SIGNIFICANT CHANGE UP (ref 8.4–10.5)
CHLORIDE SERPL-SCNC: 102 MMOL/L — SIGNIFICANT CHANGE UP (ref 96–108)
CO2 SERPL-SCNC: 24 MMOL/L — SIGNIFICANT CHANGE UP (ref 22–29)
CREAT SERPL-MCNC: 0.96 MG/DL — SIGNIFICANT CHANGE UP (ref 0.5–1.3)
EGFR: 57 ML/MIN/1.73M2 — LOW
EGFR: 57 ML/MIN/1.73M2 — LOW
EOSINOPHIL # BLD AUTO: 0.34 K/UL — SIGNIFICANT CHANGE UP (ref 0–0.5)
EOSINOPHIL NFR BLD AUTO: 2.7 % — SIGNIFICANT CHANGE UP (ref 0–6)
GLUCOSE SERPL-MCNC: 90 MG/DL — SIGNIFICANT CHANGE UP (ref 70–99)
HCT VFR BLD CALC: 38.6 % — SIGNIFICANT CHANGE UP (ref 34.5–45)
HCT VFR BLD CALC: 40.5 % — SIGNIFICANT CHANGE UP (ref 34.5–45)
HGB BLD-MCNC: 12.2 G/DL — SIGNIFICANT CHANGE UP (ref 11.5–15.5)
HGB BLD-MCNC: 12.7 G/DL — SIGNIFICANT CHANGE UP (ref 11.5–15.5)
IMM GRANULOCYTES # BLD AUTO: 0.07 K/UL — SIGNIFICANT CHANGE UP (ref 0–0.07)
IMM GRANULOCYTES NFR BLD AUTO: 0.6 % — SIGNIFICANT CHANGE UP (ref 0–0.9)
LYMPHOCYTES # BLD AUTO: 1.6 K/UL — SIGNIFICANT CHANGE UP (ref 1–3.3)
LYMPHOCYTES NFR BLD AUTO: 12.7 % — LOW (ref 13–44)
MAGNESIUM SERPL-MCNC: 2 MG/DL — SIGNIFICANT CHANGE UP (ref 1.6–2.6)
MCHC RBC-ENTMCNC: 31 PG — SIGNIFICANT CHANGE UP (ref 27–34)
MCHC RBC-ENTMCNC: 31.4 G/DL — LOW (ref 32–36)
MCHC RBC-ENTMCNC: 31.6 G/DL — LOW (ref 32–36)
MCHC RBC-ENTMCNC: 31.8 PG — SIGNIFICANT CHANGE UP (ref 27–34)
MCV RBC AUTO: 101.3 FL — HIGH (ref 80–100)
MCV RBC AUTO: 98.2 FL — SIGNIFICANT CHANGE UP (ref 80–100)
MONOCYTES # BLD AUTO: 1.6 K/UL — HIGH (ref 0–0.9)
MONOCYTES NFR BLD AUTO: 12.7 % — SIGNIFICANT CHANGE UP (ref 2–14)
NEUTROPHILS # BLD AUTO: 8.99 K/UL — HIGH (ref 1.8–7.4)
NEUTROPHILS NFR BLD AUTO: 71 % — SIGNIFICANT CHANGE UP (ref 43–77)
NRBC # BLD AUTO: 0 K/UL — SIGNIFICANT CHANGE UP (ref 0–0)
NRBC # BLD AUTO: 0 K/UL — SIGNIFICANT CHANGE UP (ref 0–0)
NRBC # FLD: 0 K/UL — SIGNIFICANT CHANGE UP (ref 0–0)
NRBC # FLD: 0 K/UL — SIGNIFICANT CHANGE UP (ref 0–0)
NRBC BLD AUTO-RTO: 0 /100 WBCS — SIGNIFICANT CHANGE UP (ref 0–0)
NRBC BLD AUTO-RTO: 0 /100 WBCS — SIGNIFICANT CHANGE UP (ref 0–0)
PLATELET # BLD AUTO: 184 K/UL — SIGNIFICANT CHANGE UP (ref 150–400)
PLATELET # BLD AUTO: 202 K/UL — SIGNIFICANT CHANGE UP (ref 150–400)
PMV BLD: 10.9 FL — SIGNIFICANT CHANGE UP (ref 7–13)
PMV BLD: 11.1 FL — SIGNIFICANT CHANGE UP (ref 7–13)
POTASSIUM SERPL-MCNC: 3.7 MMOL/L — SIGNIFICANT CHANGE UP (ref 3.5–5.3)
POTASSIUM SERPL-SCNC: 3.7 MMOL/L — SIGNIFICANT CHANGE UP (ref 3.5–5.3)
PROT SERPL-MCNC: 5.7 G/DL — LOW (ref 6.6–8.7)
RBC # BLD: 3.93 M/UL — SIGNIFICANT CHANGE UP (ref 3.8–5.2)
RBC # BLD: 4 M/UL — SIGNIFICANT CHANGE UP (ref 3.8–5.2)
RBC # FLD: 12.9 % — SIGNIFICANT CHANGE UP (ref 10.3–14.5)
RBC # FLD: 13 % — SIGNIFICANT CHANGE UP (ref 10.3–14.5)
SODIUM SERPL-SCNC: 140 MMOL/L — SIGNIFICANT CHANGE UP (ref 135–145)
WBC # BLD: 12.64 K/UL — HIGH (ref 3.8–10.5)
WBC # BLD: 14.6 K/UL — HIGH (ref 3.8–10.5)
WBC # FLD AUTO: 12.64 K/UL — HIGH (ref 3.8–10.5)
WBC # FLD AUTO: 14.6 K/UL — HIGH (ref 3.8–10.5)

## 2025-04-10 PROCEDURE — 99233 SBSQ HOSP IP/OBS HIGH 50: CPT

## 2025-04-10 PROCEDURE — 99231 SBSQ HOSP IP/OBS SF/LOW 25: CPT

## 2025-04-10 PROCEDURE — 99232 SBSQ HOSP IP/OBS MODERATE 35: CPT

## 2025-04-10 RX ORDER — METOPROLOL SUCCINATE 50 MG/1
25 TABLET, EXTENDED RELEASE ORAL
Refills: 0 | Status: DISCONTINUED | OUTPATIENT
Start: 2025-04-10 | End: 2025-04-14

## 2025-04-10 RX ORDER — LEVALBUTEROL HYDROCHLORIDE 1.25 MG/3ML
0.63 SOLUTION RESPIRATORY (INHALATION) EVERY 6 HOURS
Refills: 0 | Status: DISCONTINUED | OUTPATIENT
Start: 2025-04-10 | End: 2025-04-14

## 2025-04-10 RX ADMIN — METOPROLOL SUCCINATE 25 MILLIGRAM(S): 50 TABLET, EXTENDED RELEASE ORAL at 13:25

## 2025-04-10 RX ADMIN — TIOTROPIUM BROMIDE INHALATION SPRAY 2 PUFF(S): 3.12 SPRAY, METERED RESPIRATORY (INHALATION) at 08:18

## 2025-04-10 RX ADMIN — Medication 40 MILLIGRAM(S): at 13:25

## 2025-04-10 RX ADMIN — Medication 1 APPLICATION(S): at 06:12

## 2025-04-10 RX ADMIN — APIXABAN 5 MILLIGRAM(S): 2.5 TABLET, FILM COATED ORAL at 06:12

## 2025-04-10 RX ADMIN — Medication 81 MILLIGRAM(S): at 08:17

## 2025-04-10 RX ADMIN — LEVALBUTEROL HYDROCHLORIDE 0.63 MILLIGRAM(S): 1.25 SOLUTION RESPIRATORY (INHALATION) at 21:13

## 2025-04-10 RX ADMIN — AMIODARONE HYDROCHLORIDE 200 MILLIGRAM(S): 50 INJECTION, SOLUTION INTRAVENOUS at 00:08

## 2025-04-10 RX ADMIN — AMIODARONE HYDROCHLORIDE 200 MILLIGRAM(S): 50 INJECTION, SOLUTION INTRAVENOUS at 08:16

## 2025-04-10 RX ADMIN — Medication 2 PUFF(S): at 06:55

## 2025-04-10 RX ADMIN — Medication 1 DOSE(S): at 19:53

## 2025-04-10 RX ADMIN — AMLODIPINE BESYLATE 5 MILLIGRAM(S): 10 TABLET ORAL at 06:12

## 2025-04-10 RX ADMIN — METOPROLOL SUCCINATE 25 MILLIGRAM(S): 50 TABLET, EXTENDED RELEASE ORAL at 00:08

## 2025-04-10 RX ADMIN — CLOPIDOGREL BISULFATE 75 MILLIGRAM(S): 75 TABLET, FILM COATED ORAL at 08:16

## 2025-04-10 RX ADMIN — METOPROLOL SUCCINATE 25 MILLIGRAM(S): 50 TABLET, EXTENDED RELEASE ORAL at 21:03

## 2025-04-10 RX ADMIN — LOSARTAN POTASSIUM 50 MILLIGRAM(S): 100 TABLET, FILM COATED ORAL at 06:12

## 2025-04-10 RX ADMIN — Medication 1 DOSE(S): at 08:18

## 2025-04-10 RX ADMIN — LEVALBUTEROL HYDROCHLORIDE 0.63 MILLIGRAM(S): 1.25 SOLUTION RESPIRATORY (INHALATION) at 14:25

## 2025-04-10 RX ADMIN — ATORVASTATIN CALCIUM 80 MILLIGRAM(S): 80 TABLET, FILM COATED ORAL at 21:03

## 2025-04-10 RX ADMIN — AMIODARONE HYDROCHLORIDE 200 MILLIGRAM(S): 50 INJECTION, SOLUTION INTRAVENOUS at 16:45

## 2025-04-10 RX ADMIN — Medication 2 PUFF(S): at 17:30

## 2025-04-10 RX ADMIN — Medication 125 MICROGRAM(S): at 06:12

## 2025-04-10 RX ADMIN — APIXABAN 5 MILLIGRAM(S): 2.5 TABLET, FILM COATED ORAL at 17:00

## 2025-04-10 NOTE — PROGRESS NOTE ADULT - SUBJECTIVE AND OBJECTIVE BOX
[facility]                                                         CARDIOLOGY AT [address], [place] [address]                                                         Telephone: [phone]. Fax:[phone]                                                                             PROGRESS NOTE    Reason for follow up:  VF/CAD/Afib    Update:   Sitting up in chair feeling better today.      Review of symptoms:   Cardiac:  No chest pain. No dyspnea. No palpitations.  Respiratory: no cough. No dyspnea  Gastrointestinal: No diarrhea. No abdominal pain. No bleeding.   Neuro: No focal neuro complaints.    Vitals:  T(C): 36.9 (04-10-25 @ 07:53), Max: 37.3 (04-10-25 @ 04:02)  HR: 85 (04-10-25 @ 10:00) (74 - 167)  BP: 116/44 (04-10-25 @ 10:00) (113/54 - 172/56)  RR: 17 (04-10-25 @ 10:00) (15 - 19)  SpO2: 98% (04-10-25 @ 10:00) (95% - 100%)    I&O's Summary  09 Apr 2025 07:01  -  10 Apr 2025 07:00  --------------------------------------------------------  IN: 711 mL / OUT: 450 mL / NET: 261 mL  10 Apr 2025 07:01  -  10 Apr 2025 10:36  --------------------------------------------------------  IN: 50 mL / OUT: 0 mL / NET: 50 mL  Weight (kg): 63.5 (04-08 @ 13:37)    PHYSICAL EXAM:  Appearance: Comfortable. No acute distress  HEENT:  Atraumatic. Normocephalic.  Normal oral mucosa  Neurologic: A & O x 3, no gross focal deficits.  Cardiovascular: RRR S1 S2, No murmur, no rubs/gallops. No JVD  Respiratory: Lungs clear to auscultation, unlabored   Gastrointestinal:  Soft, Non-tender, + BS  Lower Extremities: + Peripheral Pulses, No clubbing, cyanosis, or edema  Psychiatry: Patient is calm. No agitation.   Skin: warm and dry.    CURRENT CARDIAC MEDICATIONS:  aMIOdarone    Tablet 200 milliGRAM(s) Oral every 8 hours  aMIOdarone Infusion 0.5 mG/Min IV Continuous <Continuous>  amLODIPine   Tablet 5 milliGRAM(s) Oral daily  hydrALAZINE Injectable 10 milliGRAM(s) IV Push every 6 hours PRN  losartan 50 milliGRAM(s) Oral daily  metoprolol tartrate 25 milliGRAM(s) Oral two times a day    CURRENT OTHER MEDICATIONS:  albuterol    90 MICROgram(s) HFA Inhaler 2 Puff(s) Inhalation every 6 hours PRN Shortness of Breath and/or Wheezing  fluticasone propionate/ salmeterol 250-50 MICROgram(s) Diskus 1 Dose(s) Inhalation two times a day  tiotropium 2.5 MICROgram(s) Inhaler 2 Puff(s) Inhalation daily  acetaminophen     Tablet .. 650 milliGRAM(s) Oral every 6 hours PRN Temp greater or equal to 38C (100.4F), Mild Pain (1 - 3), Moderate Pain (4 - 6)  ondansetron Injectable 4 milliGRAM(s) IV Push every 6 hours PRN Nausea and/or Vomiting  pantoprazole    Tablet 40 milliGRAM(s) Oral daily  atorvastatin 80 milliGRAM(s) Oral at bedtime  levothyroxine 125 MICROGram(s) Oral daily  apixaban 5 milliGRAM(s) Oral two times a day  aspirin enteric coated 81 milliGRAM(s) Oral daily  chlorhexidine 2% Cloths 1 Application(s) Topical <User Schedule>  clopidogrel Tablet 75 milliGRAM(s) Oral daily  influenza  Vaccine (HIGH DOSE) 0.5 milliLiter(s) IntraMuscular once    LABS:	 	                        12.2   12.64 )-----------( 184      ( 10 Apr 2025 04:50 )             38.6     04-10    140  |  102  |  23.4[H]  ----------------------------<  90  3.7   |  24.0  |  0.96    Ca    8.7      10 Apr 2025 04:50  Mg     2.0     04-10    TPro  5.7[L]  /  Alb  3.3  /  TBili  0.7  /  DBili  x   /  AST  87[H]  /  ALT  33[H]  /  AlkPhos  49  04-10    PT/INR/PTT ( 09 Apr 2025 01:11 )                       :                       :      X            :       28.5                  .        .                   .              .           .       X           .                                       Lipid Profile: Date: 04-09 @ 05:21  Total cholesterol 199; Direct LDL: --; HDL: 95; Triglycerides:104    TELEMETRY:  Sr 80, no ectopy                                                              [facility]                                                         CARDIOLOGY AT [place] [address]                                                         Telephone: [phone]. Fax:[phone]                                                                             PROGRESS NOTE    Reason for follow up:  VF/CAD/Afib    Update:   Sitting up in chair feeling better today eating breakfast.        Review of symptoms:   Cardiac:  No chest pain. No dyspnea. No palpitations.  Respiratory: no cough. No dyspnea  Gastrointestinal: No diarrhea. No abdominal pain. No bleeding.   Neuro: No focal neuro complaints.    Vitals:  T(C): 36.9 (04-10-25 @ 07:53), Max: 37.3 (04-10-25 @ 04:02)  HR: 85 (04-10-25 @ 10:00) (74 - 167)  BP: 116/44 (04-10-25 @ 10:00) (113/54 - 172/56)  RR: 17 (04-10-25 @ 10:00) (15 - 19)  SpO2: 98% (04-10-25 @ 10:00) (95% - 100%)    I&O's Summary  09 Apr 2025 07:01  -  10 Apr 2025 07:00  --------------------------------------------------------  IN: 711 mL / OUT: 450 mL / NET: 261 mL  10 Apr 2025 07:01  -  10 Apr 2025 10:36  --------------------------------------------------------  IN: 50 mL / OUT: 0 mL / NET: 50 mL  Weight (kg): 63.5 (04-08 @ 13:37)    PHYSICAL EXAM:  Appearance: Comfortable. No acute distress  HEENT:  Atraumatic. Normocephalic.  Normal oral mucosa  Neurologic: A & O x 3, no gross focal deficits.  Cardiovascular: RRR S1 S2, No murmur, no rubs/gallops. No JVD  Respiratory: Lungs clear to auscultation, unlabored   Gastrointestinal:  Soft, Non-tender, + BS  Lower Extremities: + Peripheral Pulses, No clubbing, cyanosis, or edema  Psychiatry: Patient is calm. No agitation.   Skin: warm and dry.    CURRENT CARDIAC MEDICATIONS:  aMIOdarone    Tablet 200 milliGRAM(s) Oral every 8 hours  aMIOdarone Infusion 0.5 mG/Min IV Continuous <Continuous>  amLODIPine   Tablet 5 milliGRAM(s) Oral daily  hydrALAZINE Injectable 10 milliGRAM(s) IV Push every 6 hours PRN  losartan 50 milliGRAM(s) Oral daily  metoprolol tartrate 25 milliGRAM(s) Oral two times a day    CURRENT OTHER MEDICATIONS:  albuterol    90 MICROgram(s) HFA Inhaler 2 Puff(s) Inhalation every 6 hours PRN Shortness of Breath and/or Wheezing  fluticasone propionate/ salmeterol 250-50 MICROgram(s) Diskus 1 Dose(s) Inhalation two times a day  tiotropium 2.5 MICROgram(s) Inhaler 2 Puff(s) Inhalation daily  acetaminophen     Tablet .. 650 milliGRAM(s) Oral every 6 hours PRN Temp greater or equal to 38C (100.4F), Mild Pain (1 - 3), Moderate Pain (4 - 6)  ondansetron Injectable 4 milliGRAM(s) IV Push every 6 hours PRN Nausea and/or Vomiting  pantoprazole    Tablet 40 milliGRAM(s) Oral daily  atorvastatin 80 milliGRAM(s) Oral at bedtime  levothyroxine 125 MICROGram(s) Oral daily  apixaban 5 milliGRAM(s) Oral two times a day  aspirin enteric coated 81 milliGRAM(s) Oral daily  chlorhexidine 2% Cloths 1 Application(s) Topical <User Schedule>  clopidogrel Tablet 75 milliGRAM(s) Oral daily  influenza  Vaccine (HIGH DOSE) 0.5 milliLiter(s) IntraMuscular once    LABS:	 	                        12.2   12.64 )-----------( 184      ( 10 Apr 2025 04:50 )             38.6     04-10    140  |  102  |  23.4[H]  ----------------------------<  90  3.7   |  24.0  |  0.96    Ca    8.7      10 Apr 2025 04:50  Mg     2.0     04-10    TPro  5.7[L]  /  Alb  3.3  /  TBili  0.7  /  DBili  x   /  AST  87[H]  /  ALT  33[H]  /  AlkPhos  49  04-10    PT/INR/PTT ( 09 Apr 2025 01:11 )                       :                       :      X            :       28.5                  .        .                   .              .           .       X           .                                       Lipid Profile: Date: 04-09 @ 05:21  Total cholesterol 199; Direct LDL: --; HDL: 95; Triglycerides:104    TELEMETRY:  Sr 80, no ectopy

## 2025-04-10 NOTE — PROGRESS NOTE ADULT - PROBLEM SELECTOR PLAN 2
Patient with elevated trops 64 on admission.    Now S/P LHC - CRISTINA x 1 to Ramus  Right radial precautions:  No lifting > 10 pounds, no submerging, and no driving x 3 days  Seen by IC today  DAPT:  Asprin 81mg po daily and Plavix 75mg po daily  Due to AFib - ct with Triple Therapy:  with Eliquis 5mg po bid, Plavix 75mg po daily and  aspirin 81 mg po daily. In 2 weeks on April 23rd patient to stop aspirin, but must remain on Plavix and Eliquis.  Continue Lipitor 80mg po qhs  CT losartan 50mg po daily  Lopressor 25mg bid.    Life style modifications:  Diet/ exercise/ medication compliance and cardiac rehab discussed.    Follow up outpatient 2-3  weeks with Dr. Chow

## 2025-04-10 NOTE — PROGRESS NOTE ADULT - NS ATTEND AMEND GEN_ALL_CORE FT
I agree with the above
I agree with the assessment and plan.  Based on PACs available on her EKGs she may have an accessory pathway and some of her tachycardia is may represent an SVT over an accessory pathway, though she simply had some nonsustained VT's presenting episode of VF was not caused by pretty excited atrial fibrillation.  On discussion with interventional cardiologist Dr. Jude Alex, her lesion was acute thrombotic occlusion and therefore the VF is likely a primary ischemic etiology from her MI, now resolved.  Plan as above.
coronary artery disease . s/p PCI.  significant zhweezing,.  nebs treatmetn   No further in-patient cardiac work-up/management is needed.  Follow-up in cardiology office in 2 weeks.
Patient seen and examined by me.  Patient to be on PPI  Patient to f/u with her primary cardiologist in 2-3 weeks, Dr Jude Alex  I have discussed my recommendation with the PA which are outlined above.  Will follow.

## 2025-04-10 NOTE — PROGRESS NOTE ADULT - SUBJECTIVE AND OBJECTIVE BOX
Pt seen sitting in bedside chair, offers no complaints today. Feels well, anxious to go home. Assure WCD at bedside.   Telemetry shows no events overnight. Previously documented NSVT and SVT on 4/9 AM noted.     PAST MEDICAL & SURGICAL HISTORY:  History of COPD  Hypothyroidism  HTN (hypertension)  HLD (hyperlipidemia)    MEDICATIONS  (STANDING):  aMIOdarone    Tablet 200 milliGRAM(s) Oral every 8 hours  apixaban 5 milliGRAM(s) Oral two times a day  aspirin enteric coated 81 milliGRAM(s) Oral daily  atorvastatin 80 milliGRAM(s) Oral at bedtime  chlorhexidine 2% Cloths 1 Application(s) Topical <User Schedule>  clopidogrel Tablet 75 milliGRAM(s) Oral daily  fluticasone propionate/ salmeterol 250-50 MICROgram(s) Diskus 1 Dose(s) Inhalation two times a day  influenza  Vaccine (HIGH DOSE) 0.5 milliLiter(s) IntraMuscular once  levothyroxine 125 MICROGram(s) Oral daily  losartan 50 milliGRAM(s) Oral daily  metoprolol tartrate 25 milliGRAM(s) Oral two times a day  pantoprazole    Tablet 40 milliGRAM(s) Oral daily  tiotropium 2.5 MICROgram(s) Inhaler 2 Puff(s) Inhalation daily    MEDICATIONS  (PRN):  acetaminophen     Tablet .. 650 milliGRAM(s) Oral every 6 hours PRN Temp greater or equal to 38C (100.4F), Mild Pain (1 - 3), Moderate Pain (4 - 6)  albuterol    90 MICROgram(s) HFA Inhaler 2 Puff(s) Inhalation every 6 hours PRN Shortness of Breath and/or Wheezing  hydrALAZINE Injectable 10 milliGRAM(s) IV Push every 6 hours PRN SBP > 160  ondansetron Injectable 4 milliGRAM(s) IV Push every 6 hours PRN Nausea and/or Vomiting    Allergies:  Allergy Status Unknown    Vital Signs Last 24 Hrs  T(C): 36.9 (10 Apr 2025 07:53), Max: 37.3 (10 Apr 2025 04:02)  T(F): 98.5 (10 Apr 2025 07:53), Max: 99.1 (10 Apr 2025 04:02)  HR: 87 (10 Apr 2025 12:00) (74 - 167)  BP: 104/54 (10 Apr 2025 12:00) (104/54 - 172/56)  BP(mean): 69 (10 Apr 2025 12:00) (66 - 98)  RR: 18 (10 Apr 2025 12:00) (15 - 19)  SpO2: 100% (10 Apr 2025 12:00) (95% - 100%)    Parameters below as of 10 Apr 2025 12:00  Patient On (Oxygen Delivery Method): room air    Physical Exam:  Constitutional: awake, alert, no obvious distress   Cardiovascular: +S1S2 RRR  Pulmonary: CTA b/l, unlabored  GI: soft NTND +BS  Extremities: no pedal edema, +distal pulses b/l  Neuro: alert, no focal deficits     LABS:                        12.2   12.64 )-----------( 184      ( 10 Apr 2025 04:50 )             38.6     04-10    140  |  102  |  23.4[H]  ----------------------------<  90  3.7   |  24.0  |  0.96    Ca    8.7      10 Apr 2025 04:50  Mg     2.0     04-10    TPro  5.7[L]  /  Alb  3.3  /  TBili  0.7  /  DBili  x   /  AST  87[H]  /  ALT  33[H]  /  AlkPhos  49  04-10    PT/INR - ( 08 Apr 2025 12:45 )   PT: 11.6 sec;   INR: 1.03 ratio      PTT - ( 09 Apr 2025 01:11 )  PTT:28.5 sec  Urinalysis Basic - ( 10 Apr 2025 04:50 )    Color: x / Appearance: x / SG: x / pH: x  Gluc: 90 mg/dL / Ketone: x  / Bili: x / Urobili: x   Blood: x / Protein: x / Nitrite: x   Leuk Esterase: x / RBC: x / WBC x   Sq Epi: x / Non Sq Epi: x / Bacteria: x    RADIOLOGY & ADDITIONAL TESTS:  TTE: 4/8/2025: CONCLUSIONS:   1. Left ventricular cavity is normal in size. Left ventricular wall thickness is normal. Left ventricular systolic function is normal with an ejection fraction visually estimated at 60 to 65 %.   2. There is moderate (grade 2) left ventricular diastolic dysfunction, with elevated left ventricular filling pressure.   3. There is normal LV mass and normal geometry.   4. Normal right ventricular cavity size and normal right ventricular systolic function.   5. Left atrium is moderately dilated.   6. The right atrium is normal in size.   7. Trileaflet aortic valve. There is moderate calcification of the aortic valve leaflets. Mild to moderate aortic stenosis.   8. Mild aortic regurgitation.   9. The peak transaortic velocity is 2.00 m/s, peak transaortic gradient is 16.0 mmHg and mean transaortic gradient is 7.9 mmHg with an LVOT/aortic valve VTI ratio of 0.45. The aortic valve acceleration time is 74 msec. The effective orifice area is estimated at 1.52 cm² by the continuity equation.  10. Moderate mitral valve leaflet calcification.  11. There is severe calcification of the mitral valve annulus.  12. Trace mitral regurgitation.  13. Mild tricuspid regurgitation.  14. Estimated pulmonary artery systolic pressure is 30 mmHg.

## 2025-04-10 NOTE — PROGRESS NOTE ADULT - PROBLEM SELECTOR PLAN 1
Patient with witnessed torsades on admission   Telemetry SR 80's, no ectopy noted.    EP following as well.  Plan to wear Life Vest on discharge - seen by Assure and device is bedside.    Amiodarone as per EP.   Lopressor as per EP.    No chest pain or palpitations per patient  No further in patient work up noted.    Follow up outpatient 2 weeks.
Patient with witnessed torsades in ED  Today ectopy, afib PAF, and SR noted at times  EP following  Plan to wear Life Vest on discharge  Amiodarone drip and PO loading as per EP  Also started lopressor as recommended by EP.    No chest pain or palpitations per patient  Ct telemetry for now.
Statement Selected

## 2025-04-10 NOTE — PROGRESS NOTE ADULT - ASSESSMENT
89F with a history of hypertension, COPD, and hypothyroidism, who presented with chest discomfort. On presentation, the patient was noted to have atrial fibrillation with aberrancy. The patient then became pulseless and was found to have Torsades and ventricular fibrillation arrest. Cardioversion was performed along with a brief course of CPR with return of spontaneous circulation. Magnesium was administered and the patient was noted to have regained consciousness. The patient denied any history of arrhythmia in the past and was unaware of any aggravating factors. She denied any recent illnesses, fevers, cough, or sick contacts.    Cardiac arrest / NSTEMI  - Electrical cardioversion resulting in sinus rhythm  - Noted to have Torsades de pointes and magnesium was supplemented  - s/p C with CRISTINA to Ramus on 4/8  - s/p Heparin Infusion --> now on Eliquis   - Per Cardio -  Eliquis 5mg po bid, Plavix 75mg po daily and aspirin 81 mg po daily. In 2 weeks on April 23rd, patient to stop aspirin, but must remain on Plavix and Eliquis.  - Continue Amiodarone load  - Continue Asprin, Plavix, Lipitor (monitor LFTs), Metoprolol and Losartan   - Will increase Metoprolol, hold home Norvasc  - Will need LifeVest prior to d/c   - Cardio / EP follow up noted     New Onset Atrial fibrillation  - Continue Amiodarone and Metoprolol    - Continue Eliquis     COPD  - Not in acute exacerbation  - Albuterol as needed  - Continue on fluticasone/salmeterol and tiotropium    Hypothyroidism  - On levothyroxine    Leukocytosis   - Likely reactive   - Monitor     Transaminitis   - Likely shock liver   - Monitor closely    Dispo: Monitor for 24 hours on telemetry after BB increased, possible discharge home tomorrow w/ LifeVest.

## 2025-04-10 NOTE — PROGRESS NOTE ADULT - ASSESSMENT
89 y/o female with obesity, HTN, HLD and Copd who woke up and did not feel well, felt weak.  Went to [employer] and started to experience chest discomfort which worsened.  Brought to ER was being evaluated and had a witnessed torsades arrest, cardioverted to atrial fib then converted to NSR.  Feeling ok now but still has chest pain.

## 2025-04-10 NOTE — PROGRESS NOTE ADULT - ASSESSMENT
Assessment/Recommendations:   89 female with HTN, hyperlipidemia, COPD on 2L NC prn, hypothyroidism, and chronic diastolic heart failure who presented to [facility] with complaints of chest pain. Initial telemetry revealed AFib and irregular WCT for which she was treated with IV Lopressor. She subsequently lost her pulse and went into VF arrest; urgently cardioversion with 200J x 1 with conversion to sinus rhythm. Troponin elevated 64, Echo showed preserved EF 60-65%. She underwent LHC on 4/8 and had PCI to Ramus. Telemetry shows NSR with oen brief NSVT and one SVT episode.     # VF arrest   - Continue amiodarone 200mg TID to complete 14 day course, then transition to 200mg daily thereafter.   - TFTs & LFTs should be monitored q 3-6 months while on amiodarone therapy. Anticipate <1 year of amiodarone therapy for this patient; however if > 1 year, patient will need annual fundoscopic exam and PFTs. Patient is advised of associated risks and need for monitoring; all questions answered to pt's expressed understanding.   - Assure WCD on discharge (at bedside).     # New onset atrial fibrillation   - Continue Eliquis 5mg Q 12 hrs.   - Continue metoprolol 25mg BID   - Continue amiodarone as above.   - Outpatient follow up with Dr. Ramos to discuss possible ablation.     # CAD   - s/p PCI to ramus   - Currently on triple therapy with aspirin 81mg, Plavix 75mg and Eliquis 5mg BID. Per cardiology, continue triple therapy until 4/23 at which time she will discontinue aspirin.   - Further management as per general cardiology.      Will sign off from EP perspective. Please recall as needed.   Will arrange for outpatient follow up.

## 2025-04-10 NOTE — PROGRESS NOTE ADULT - SUBJECTIVE AND OBJECTIVE BOX
[facility] Division of Hospital Medicine  Augustine Mcfadden MD    Chief Complaint:  Patient is a 89y old  Female who presents with a chief complaint of VF (09 Apr 2025 09:57)      SUBJECTIVE / OVERNIGHT EVENTS:  Patient seen and examined at bedside. No acute events reported overnight. No new complaints.    MEDICATIONS  (STANDING):  aMIOdarone    Tablet 200 milliGRAM(s) Oral every 8 hours  apixaban 5 milliGRAM(s) Oral two times a day  aspirin enteric coated 81 milliGRAM(s) Oral daily  atorvastatin 80 milliGRAM(s) Oral at bedtime  chlorhexidine 2% Cloths 1 Application(s) Topical <User Schedule>  clopidogrel Tablet 75 milliGRAM(s) Oral daily  fluticasone propionate/ salmeterol 250-50 MICROgram(s) Diskus 1 Dose(s) Inhalation two times a day  influenza  Vaccine (HIGH DOSE) 0.5 milliLiter(s) IntraMuscular once  levothyroxine 125 MICROGram(s) Oral daily  losartan 50 milliGRAM(s) Oral daily  metoprolol tartrate 25 milliGRAM(s) Oral two times a day  pantoprazole    Tablet 40 milliGRAM(s) Oral daily  tiotropium 2.5 MICROgram(s) Inhaler 2 Puff(s) Inhalation daily    MEDICATIONS  (PRN):  acetaminophen     Tablet .. 650 milliGRAM(s) Oral every 6 hours PRN Temp greater or equal to 38C (100.4F), Mild Pain (1 - 3), Moderate Pain (4 - 6)  albuterol    90 MICROgram(s) HFA Inhaler 2 Puff(s) Inhalation every 6 hours PRN Shortness of Breath and/or Wheezing  hydrALAZINE Injectable 10 milliGRAM(s) IV Push every 6 hours PRN SBP > 160  ondansetron Injectable 4 milliGRAM(s) IV Push every 6 hours PRN Nausea and/or Vomiting        I&O's Summary    09 Apr 2025 07:01  -  10 Apr 2025 07:00  --------------------------------------------------------  IN: 711 mL / OUT: 450 mL / NET: 261 mL    10 Apr 2025 07:01  -  10 Apr 2025 11:32  --------------------------------------------------------  IN: 50 mL / OUT: 0 mL / NET: 50 mL        PHYSICAL EXAM:  Vital Signs Last 24 Hrs  T(C): 36.9 (10 Apr 2025 07:53), Max: 37.3 (10 Apr 2025 04:02)  T(F): 98.5 (10 Apr 2025 07:53), Max: 99.1 (10 Apr 2025 04:02)  HR: 85 (10 Apr 2025 10:00) (74 - 167)  BP: 116/44 (10 Apr 2025 10:00) (113/54 - 172/56)  BP(mean): 66 (10 Apr 2025 10:00) (66 - 107)  RR: 17 (10 Apr 2025 10:00) (15 - 19)  SpO2: 98% (10 Apr 2025 10:00) (95% - 100%)    Parameters below as of 10 Apr 2025 10:00  Patient On (Oxygen Delivery Method): room air            CONSTITUTIONAL: NAD  HEENT: NC/AT, PERRL, no JVD  RESPIRATORY: CTA bilaterally, normal effort  CARDIOVASCULAR: RRR, S1/S2+, no m/g/r  ABDOMEN: Nontender to palpation, normoactive bowel sounds, no rebound/guarding  MUSCULOSKELETAL: No edema, cyanosis or deformities.  PSYCH: Calm, affect appropriate.  NEUROLOGY: Awake, alert, no focal neurological deficits.   SKIN: No rashes; no palpable lesions  VASC: Distal pulses palpable    LABS:                        12.2   12.64 )-----------( 184      ( 10 Apr 2025 04:50 )             38.6     04-10    140  |  102  |  23.4[H]  ----------------------------<  90  3.7   |  24.0  |  0.96    Ca    8.7      10 Apr 2025 04:50  Mg     2.0     04-10    TPro  5.7[L]  /  Alb  3.3  /  TBili  0.7  /  DBili  x   /  AST  87[H]  /  ALT  33[H]  /  AlkPhos  49  04-10    PT/INR - ( 08 Apr 2025 12:45 )   PT: 11.6 sec;   INR: 1.03 ratio         PTT - ( 09 Apr 2025 01:11 )  PTT:28.5 sec      Urinalysis Basic - ( 10 Apr 2025 04:50 )    Color: x / Appearance: x / SG: x / pH: x  Gluc: 90 mg/dL / Ketone: x  / Bili: x / Urobili: x   Blood: x / Protein: x / Nitrite: x   Leuk Esterase: x / RBC: x / WBC x   Sq Epi: x / Non Sq Epi: x / Bacteria: x        CAPILLARY BLOOD GLUCOSE            RADIOLOGY & ADDITIONAL TESTS:  Results Reviewed:   Imaging Personally Reviewed:  Electrocardiogram Personally Reviewed:

## 2025-04-11 LAB
ALBUMIN SERPL ELPH-MCNC: 3.4 G/DL — SIGNIFICANT CHANGE UP (ref 3.3–5.2)
ALP SERPL-CCNC: 50 U/L — SIGNIFICANT CHANGE UP (ref 40–120)
ALT FLD-CCNC: 26 U/L — SIGNIFICANT CHANGE UP
ANION GAP SERPL CALC-SCNC: 14 MMOL/L — SIGNIFICANT CHANGE UP (ref 5–17)
AST SERPL-CCNC: 42 U/L — HIGH
BILIRUB SERPL-MCNC: 0.6 MG/DL — SIGNIFICANT CHANGE UP (ref 0.4–2)
BUN SERPL-MCNC: 34.6 MG/DL — HIGH (ref 8–20)
CALCIUM SERPL-MCNC: 8.5 MG/DL — SIGNIFICANT CHANGE UP (ref 8.4–10.5)
CHLORIDE SERPL-SCNC: 103 MMOL/L — SIGNIFICANT CHANGE UP (ref 96–108)
CO2 SERPL-SCNC: 25 MMOL/L — SIGNIFICANT CHANGE UP (ref 22–29)
CREAT SERPL-MCNC: 1.56 MG/DL — HIGH (ref 0.5–1.3)
EGFR: 32 ML/MIN/1.73M2 — LOW
EGFR: 32 ML/MIN/1.73M2 — LOW
GLUCOSE SERPL-MCNC: 104 MG/DL — HIGH (ref 70–99)
HCT VFR BLD CALC: 39.5 % — SIGNIFICANT CHANGE UP (ref 34.5–45)
HGB BLD-MCNC: 12.1 G/DL — SIGNIFICANT CHANGE UP (ref 11.5–15.5)
MAGNESIUM SERPL-MCNC: 2.1 MG/DL — SIGNIFICANT CHANGE UP (ref 1.6–2.6)
MCHC RBC-ENTMCNC: 30.6 G/DL — LOW (ref 32–36)
MCHC RBC-ENTMCNC: 31.1 PG — SIGNIFICANT CHANGE UP (ref 27–34)
MCV RBC AUTO: 101.5 FL — HIGH (ref 80–100)
NRBC # BLD AUTO: 0 K/UL — SIGNIFICANT CHANGE UP (ref 0–0)
NRBC # FLD: 0 K/UL — SIGNIFICANT CHANGE UP (ref 0–0)
NRBC BLD AUTO-RTO: 0 /100 WBCS — SIGNIFICANT CHANGE UP (ref 0–0)
PHOSPHATE SERPL-MCNC: 5.2 MG/DL — HIGH (ref 2.4–4.7)
PLATELET # BLD AUTO: 189 K/UL — SIGNIFICANT CHANGE UP (ref 150–400)
PMV BLD: 11.1 FL — SIGNIFICANT CHANGE UP (ref 7–13)
POTASSIUM SERPL-MCNC: 4 MMOL/L — SIGNIFICANT CHANGE UP (ref 3.5–5.3)
POTASSIUM SERPL-SCNC: 4 MMOL/L — SIGNIFICANT CHANGE UP (ref 3.5–5.3)
PROT SERPL-MCNC: 5.9 G/DL — LOW (ref 6.6–8.7)
RBC # BLD: 3.89 M/UL — SIGNIFICANT CHANGE UP (ref 3.8–5.2)
RBC # FLD: 13 % — SIGNIFICANT CHANGE UP (ref 10.3–14.5)
SODIUM SERPL-SCNC: 142 MMOL/L — SIGNIFICANT CHANGE UP (ref 135–145)
WBC # BLD: 14.24 K/UL — HIGH (ref 3.8–10.5)
WBC # FLD AUTO: 14.24 K/UL — HIGH (ref 3.8–10.5)

## 2025-04-11 PROCEDURE — 99233 SBSQ HOSP IP/OBS HIGH 50: CPT

## 2025-04-11 RX ADMIN — Medication 40 MILLIGRAM(S): at 11:55

## 2025-04-11 RX ADMIN — LEVALBUTEROL HYDROCHLORIDE 0.63 MILLIGRAM(S): 1.25 SOLUTION RESPIRATORY (INHALATION) at 16:47

## 2025-04-11 RX ADMIN — Medication 125 MICROGRAM(S): at 05:46

## 2025-04-11 RX ADMIN — Medication 1 DOSE(S): at 11:54

## 2025-04-11 RX ADMIN — LOSARTAN POTASSIUM 50 MILLIGRAM(S): 100 TABLET, FILM COATED ORAL at 05:46

## 2025-04-11 RX ADMIN — METOPROLOL SUCCINATE 25 MILLIGRAM(S): 50 TABLET, EXTENDED RELEASE ORAL at 22:17

## 2025-04-11 RX ADMIN — CLOPIDOGREL BISULFATE 75 MILLIGRAM(S): 75 TABLET, FILM COATED ORAL at 11:55

## 2025-04-11 RX ADMIN — Medication 75 MILLILITER(S): at 12:04

## 2025-04-11 RX ADMIN — TIOTROPIUM BROMIDE INHALATION SPRAY 2 PUFF(S): 3.12 SPRAY, METERED RESPIRATORY (INHALATION) at 12:04

## 2025-04-11 RX ADMIN — Medication 1 APPLICATION(S): at 05:47

## 2025-04-11 RX ADMIN — AMIODARONE HYDROCHLORIDE 200 MILLIGRAM(S): 50 INJECTION, SOLUTION INTRAVENOUS at 09:22

## 2025-04-11 RX ADMIN — METOPROLOL SUCCINATE 25 MILLIGRAM(S): 50 TABLET, EXTENDED RELEASE ORAL at 11:58

## 2025-04-11 RX ADMIN — ATORVASTATIN CALCIUM 80 MILLIGRAM(S): 80 TABLET, FILM COATED ORAL at 22:21

## 2025-04-11 RX ADMIN — APIXABAN 5 MILLIGRAM(S): 2.5 TABLET, FILM COATED ORAL at 17:46

## 2025-04-11 RX ADMIN — AMIODARONE HYDROCHLORIDE 200 MILLIGRAM(S): 50 INJECTION, SOLUTION INTRAVENOUS at 17:45

## 2025-04-11 RX ADMIN — AMIODARONE HYDROCHLORIDE 200 MILLIGRAM(S): 50 INJECTION, SOLUTION INTRAVENOUS at 00:19

## 2025-04-11 RX ADMIN — APIXABAN 5 MILLIGRAM(S): 2.5 TABLET, FILM COATED ORAL at 05:45

## 2025-04-11 RX ADMIN — Medication 81 MILLIGRAM(S): at 11:54

## 2025-04-11 NOTE — PROGRESS NOTE ADULT - ASSESSMENT
89F with a history of hypertension, COPD, and hypothyroidism, who presented with chest discomfort. On presentation, the patient was noted to have atrial fibrillation with aberrancy. The patient then became pulseless and was found to have Torsades and ventricular fibrillation arrest. Cardioversion was performed along with a brief course of CPR with return of spontaneous circulation. Magnesium was administered and the patient was noted to have regained consciousness. The patient denied any history of arrhythmia in the past and was unaware of any aggravating factors. She denied any recent illnesses, fevers, cough, or sick contacts.    Cardiac arrest / NSTEMI  - Electrical cardioversion resulting in sinus rhythm  - Noted to have Torsades de pointes and magnesium was supplemented  - s/p C with CRISTINA to Ramus on 4/8  - s/p Heparin Infusion --> now on Eliquis   - Per Cardio -  Eliquis 5mg po bid, Plavix 75mg po daily and aspirin 81 mg po daily. In 2 weeks on April 23rd, patient to stop aspirin, but must remain on Plavix and Eliquis.  - Continue Amiodarone load  - Continue Asprin, Plavix, Lipitor (monitor LFTs), Metoprolol and Losartan   - Will increase Metoprolol, hold home Norvasc  - Will need LifeVest prior to d/c   - Cardio / EP follow up noted     New Onset Atrial fibrillation  - Continue Amiodarone and Metoprolol    - Continue Eliquis     JOHNATHON  Likely 2/2 diarrhea  Hold Losartan  IVF  Monitor BMP    COPD  - Not in acute exacerbation  - Albuterol as needed  - Continue on fluticasone/salmeterol and tiotropium    Hypothyroidism  - On levothyroxine    Leukocytosis   - Likely reactive   - Monitor     Transaminitis   - Likely shock liver   - Monitor closely    Dispo: Monitor for 24 hours till johnathon resolves, life vest education w family

## 2025-04-11 NOTE — PROGRESS NOTE ADULT - SUBJECTIVE AND OBJECTIVE BOX
CORRINE GARAY  89y  Female      Patient is a 89y old  Female who presents with a chief complaint of VF (09 Apr 2025 09:57)      INTERVAL HPI/OVERNIGHT EVENTS:  Seen and examined, pleasant  Reports diarrhea has resolved  States she isnt drinking water because it has no taste      REVIEW OF SYSTEMS:  as above    T(C): 36.7 (04-11-25 @ 08:10), Max: 37.1 (04-11-25 @ 05:15)  HR: 79 (04-11-25 @ 11:48) (76 - 85)  BP: 112/71 (04-11-25 @ 11:48) (112/71 - 138/74)  RR: 18 (04-11-25 @ 11:48) (18 - 18)  SpO2: 99% (04-11-25 @ 11:48) (97% - 100%)  Wt(kg): --Vital Signs Last 24 Hrs  T(C): 36.7 (11 Apr 2025 08:10), Max: 37.1 (11 Apr 2025 05:15)  T(F): 98 (11 Apr 2025 08:10), Max: 98.8 (11 Apr 2025 05:15)  HR: 79 (11 Apr 2025 11:48) (76 - 85)  BP: 112/71 (11 Apr 2025 11:48) (112/71 - 138/74)  BP(mean): 92 (10 Apr 2025 17:04) (74 - 92)  RR: 18 (11 Apr 2025 11:48) (18 - 18)  SpO2: 99% (11 Apr 2025 11:48) (97% - 100%)    Parameters below as of 11 Apr 2025 11:48  Patient On (Oxygen Delivery Method): nasal cannula  O2 Flow (L/min): 2      PHYSICAL EXAM:  CONSTITUTIONAL: NAD  HEENT: NC/AT, PERRL, no JVD  RESPIRATORY: CTA bilaterally, normal effort  CARDIOVASCULAR: RRR, S1/S2+, no m/g/r  ABDOMEN: Nontender to palpation, normoactive bowel sounds, no rebound/guarding  MUSCULOSKELETAL: No edema, cyanosis or deformities.  PSYCH: Calm, affect appropriate.  NEUROLOGY: Awake, alert, no focal neurological deficits.   SKIN: No rashes; no palpable lesions  VASC: Distal pulses palpable    Consultant(s) Notes Reviewed:  [x ] YES  [ ] NO  Care Discussed with Consultants/Other Providers [ x] YES  [ ] NO    LABS:                        12.1   14.24 )-----------( 189      ( 11 Apr 2025 05:45 )             39.5     04-11    142  |  103  |  34.6[H]  ----------------------------<  104[H]  4.0   |  25.0  |  1.56[H]    Ca    8.5      11 Apr 2025 05:45  Phos  5.2     04-11  Mg     2.1     04-11    TPro  5.9[L]  /  Alb  3.4  /  TBili  0.6  /  DBili  x   /  AST  42[H]  /  ALT  26  /  AlkPhos  50  04-11      Urinalysis Basic - ( 11 Apr 2025 05:45 )    Color: x / Appearance: x / SG: x / pH: x  Gluc: 104 mg/dL / Ketone: x  / Bili: x / Urobili: x   Blood: x / Protein: x / Nitrite: x   Leuk Esterase: x / RBC: x / WBC x   Sq Epi: x / Non Sq Epi: x / Bacteria: x      CAPILLARY BLOOD GLUCOSE            Urinalysis Basic - ( 11 Apr 2025 05:45 )    Color: x / Appearance: x / SG: x / pH: x  Gluc: 104 mg/dL / Ketone: x  / Bili: x / Urobili: x   Blood: x / Protein: x / Nitrite: x   Leuk Esterase: x / RBC: x / WBC x   Sq Epi: x / Non Sq Epi: x / Bacteria: x        RADIOLOGY & ADDITIONAL TESTS:    Imaging Personally Reviewed:  [ ] YES  [ ] NO    HEALTH ISSUES - PROBLEM Dx:  VF (ventricular fibrillation)    CAD (coronary artery disease)  Coronary artery disease is a condition where the arteries the supply the heart muscle get clogges with fatty deposits & puts you at risk for a heart attack  Call your doctor if you have any new pain, pressure, or discomfort in the center of your chest, pain, tingling or discomfort in arms, back, neck, jaw, or stomach, shortness of breath, nausea, vomiting, burping or heartburn, sweating, cold and clammy skin, racing or abnormal heartbeat for more than 10 minutes or if they keep coming & going.  Call 911 and do not tr to get to hospital by care  You can help yourself with lefestyle changes (quitting smoking if you smoke), eat lots of fruits & vegetables & low fat dairy products, not a lot of meat & fatty foods, walk or some form of physical activity most days of the week, lose weight if you are overweight  Take your cardiac medication as prescribed to lower cholesterol, to lower blood pressure, aspirin to prevent blood clots, and diabetes control  Make sure to keep appointments with doctor for cardiac follow up care

## 2025-04-12 LAB
ANION GAP SERPL CALC-SCNC: 13 MMOL/L — SIGNIFICANT CHANGE UP (ref 5–17)
APPEARANCE UR: CLEAR — SIGNIFICANT CHANGE UP
BASOPHILS # BLD AUTO: 0.03 K/UL — SIGNIFICANT CHANGE UP (ref 0–0.2)
BASOPHILS NFR BLD AUTO: 0.2 % — SIGNIFICANT CHANGE UP (ref 0–2)
BILIRUB UR-MCNC: NEGATIVE — SIGNIFICANT CHANGE UP
BUN SERPL-MCNC: 42.1 MG/DL — HIGH (ref 8–20)
CALCIUM SERPL-MCNC: 7.9 MG/DL — LOW (ref 8.4–10.5)
CHLORIDE SERPL-SCNC: 106 MMOL/L — SIGNIFICANT CHANGE UP (ref 96–108)
CO2 SERPL-SCNC: 21 MMOL/L — LOW (ref 22–29)
COLOR SPEC: YELLOW — SIGNIFICANT CHANGE UP
CREAT SERPL-MCNC: 1.97 MG/DL — HIGH (ref 0.5–1.3)
DIFF PNL FLD: NEGATIVE — SIGNIFICANT CHANGE UP
EGFR: 24 ML/MIN/1.73M2 — LOW
EGFR: 24 ML/MIN/1.73M2 — LOW
EOSINOPHIL # BLD AUTO: 0.5 K/UL — SIGNIFICANT CHANGE UP (ref 0–0.5)
EOSINOPHIL NFR BLD AUTO: 3.9 % — SIGNIFICANT CHANGE UP (ref 0–6)
GLUCOSE SERPL-MCNC: 100 MG/DL — HIGH (ref 70–99)
GLUCOSE UR QL: NEGATIVE MG/DL — SIGNIFICANT CHANGE UP
HCT VFR BLD CALC: 35.6 % — SIGNIFICANT CHANGE UP (ref 34.5–45)
HGB BLD-MCNC: 10.8 G/DL — LOW (ref 11.5–15.5)
IMM GRANULOCYTES # BLD AUTO: 0.05 K/UL — SIGNIFICANT CHANGE UP (ref 0–0.07)
IMM GRANULOCYTES NFR BLD AUTO: 0.4 % — SIGNIFICANT CHANGE UP (ref 0–0.9)
KETONES UR-MCNC: NEGATIVE MG/DL — SIGNIFICANT CHANGE UP
LEUKOCYTE ESTERASE UR-ACNC: NEGATIVE — SIGNIFICANT CHANGE UP
LYMPHOCYTES # BLD AUTO: 1.15 K/UL — SIGNIFICANT CHANGE UP (ref 1–3.3)
LYMPHOCYTES NFR BLD AUTO: 9 % — LOW (ref 13–44)
MAGNESIUM SERPL-MCNC: 1.9 MG/DL — SIGNIFICANT CHANGE UP (ref 1.6–2.6)
MCHC RBC-ENTMCNC: 30.3 G/DL — LOW (ref 32–36)
MCHC RBC-ENTMCNC: 31.5 PG — SIGNIFICANT CHANGE UP (ref 27–34)
MCV RBC AUTO: 103.8 FL — HIGH (ref 80–100)
MONOCYTES # BLD AUTO: 1.78 K/UL — HIGH (ref 0–0.9)
MONOCYTES NFR BLD AUTO: 13.9 % — SIGNIFICANT CHANGE UP (ref 2–14)
NEUTROPHILS # BLD AUTO: 9.25 K/UL — HIGH (ref 1.8–7.4)
NEUTROPHILS NFR BLD AUTO: 72.6 % — SIGNIFICANT CHANGE UP (ref 43–77)
NITRITE UR-MCNC: NEGATIVE — SIGNIFICANT CHANGE UP
NRBC # BLD AUTO: 0 K/UL — SIGNIFICANT CHANGE UP (ref 0–0)
NRBC # FLD: 0 K/UL — SIGNIFICANT CHANGE UP (ref 0–0)
NRBC BLD AUTO-RTO: 0 /100 WBCS — SIGNIFICANT CHANGE UP (ref 0–0)
PH UR: 5 — SIGNIFICANT CHANGE UP (ref 5–8)
PLATELET # BLD AUTO: 167 K/UL — SIGNIFICANT CHANGE UP (ref 150–400)
PMV BLD: 11.4 FL — SIGNIFICANT CHANGE UP (ref 7–13)
POTASSIUM SERPL-MCNC: 4.4 MMOL/L — SIGNIFICANT CHANGE UP (ref 3.5–5.3)
POTASSIUM SERPL-SCNC: 4.4 MMOL/L — SIGNIFICANT CHANGE UP (ref 3.5–5.3)
PROT UR-MCNC: SIGNIFICANT CHANGE UP MG/DL
RBC # BLD: 3.43 M/UL — LOW (ref 3.8–5.2)
RBC # FLD: 13 % — SIGNIFICANT CHANGE UP (ref 10.3–14.5)
SODIUM SERPL-SCNC: 140 MMOL/L — SIGNIFICANT CHANGE UP (ref 135–145)
SP GR SPEC: 1.02 — SIGNIFICANT CHANGE UP (ref 1–1.03)
UROBILINOGEN FLD QL: 1 MG/DL — SIGNIFICANT CHANGE UP (ref 0.2–1)
WBC # BLD: 12.76 K/UL — HIGH (ref 3.8–10.5)
WBC # FLD AUTO: 12.76 K/UL — HIGH (ref 3.8–10.5)

## 2025-04-12 PROCEDURE — 99222 1ST HOSP IP/OBS MODERATE 55: CPT

## 2025-04-12 PROCEDURE — 99233 SBSQ HOSP IP/OBS HIGH 50: CPT

## 2025-04-12 RX ORDER — MAGNESIUM OXIDE 400 MG
400 TABLET ORAL ONCE
Refills: 0 | Status: COMPLETED | OUTPATIENT
Start: 2025-04-12 | End: 2025-04-12

## 2025-04-12 RX ADMIN — APIXABAN 5 MILLIGRAM(S): 2.5 TABLET, FILM COATED ORAL at 17:01

## 2025-04-12 RX ADMIN — Medication 40 MILLIGRAM(S): at 11:47

## 2025-04-12 RX ADMIN — AMIODARONE HYDROCHLORIDE 200 MILLIGRAM(S): 50 INJECTION, SOLUTION INTRAVENOUS at 00:52

## 2025-04-12 RX ADMIN — ATORVASTATIN CALCIUM 80 MILLIGRAM(S): 80 TABLET, FILM COATED ORAL at 21:53

## 2025-04-12 RX ADMIN — Medication 400 MILLIGRAM(S): at 11:47

## 2025-04-12 RX ADMIN — Medication 81 MILLIGRAM(S): at 11:47

## 2025-04-12 RX ADMIN — Medication 1 APPLICATION(S): at 05:41

## 2025-04-12 RX ADMIN — Medication 75 MILLILITER(S): at 00:38

## 2025-04-12 RX ADMIN — TIOTROPIUM BROMIDE INHALATION SPRAY 2 PUFF(S): 3.12 SPRAY, METERED RESPIRATORY (INHALATION) at 08:49

## 2025-04-12 RX ADMIN — Medication 1 DOSE(S): at 08:49

## 2025-04-12 RX ADMIN — LEVALBUTEROL HYDROCHLORIDE 0.63 MILLIGRAM(S): 1.25 SOLUTION RESPIRATORY (INHALATION) at 17:16

## 2025-04-12 RX ADMIN — APIXABAN 5 MILLIGRAM(S): 2.5 TABLET, FILM COATED ORAL at 05:41

## 2025-04-12 RX ADMIN — AMIODARONE HYDROCHLORIDE 200 MILLIGRAM(S): 50 INJECTION, SOLUTION INTRAVENOUS at 16:57

## 2025-04-12 RX ADMIN — Medication 125 MICROGRAM(S): at 05:41

## 2025-04-12 RX ADMIN — METOPROLOL SUCCINATE 25 MILLIGRAM(S): 50 TABLET, EXTENDED RELEASE ORAL at 11:47

## 2025-04-12 RX ADMIN — AMIODARONE HYDROCHLORIDE 200 MILLIGRAM(S): 50 INJECTION, SOLUTION INTRAVENOUS at 08:49

## 2025-04-12 RX ADMIN — METOPROLOL SUCCINATE 25 MILLIGRAM(S): 50 TABLET, EXTENDED RELEASE ORAL at 21:53

## 2025-04-12 RX ADMIN — CLOPIDOGREL BISULFATE 75 MILLIGRAM(S): 75 TABLET, FILM COATED ORAL at 11:48

## 2025-04-12 NOTE — PROGRESS NOTE ADULT - ASSESSMENT
Dispo: Follow up o/p w/ Dr. Alex. PT rec home w/ rolling walker.  Patient is a 89y Female with PMHx COPD (on home 2LNC), HTN, HLD, hypothyroidism admitted for Vfib cardiac arrest s/p 12 sec CPR + defibrillation + LHC w/ CRISTINA to Ramus artery.     #Vfib arrest s/p CRISTINA to Ramus   -      Dispo: Follow up o/p w/ Dr. Alex 2-3 weeks. PT rec home w/ rolling walker.  Patient is a 89y Female with PMHx COPD (on home 2LNC), HTN, HLD, hypothyroidism admitted for Vfib cardiac arrest s/p 12 sec CPR + defibrillation + LHC w/ CRISTINA to Ramus artery.     #Vfib arrest s/p CRISTINA to Ramus   #CAD   -Cardiology consulted   -EP consulted, rec triple therapy until 4/23  (Aspirin 81mg, Plavix 75mg, Eliquis 5mg BID), after which ASA will be discontinued  -WCD upon discharge  -Atorvastatin 80mh daily   -Losartan 25mg BID  -Aspirin 81mg daily  -Plavix 75mg daily         #New onset Afib   -Eliquis 5mg BID   -Metoprolol tartrate 25mg BID   -amiodarone 200mg q8 x 14 days (End Date 4/23)  -amiodarone 200mg daily (Start Date 4/23)  -monitoring of TFTs + LFTs q 3-6 months while on Pacerone           DVT ppx: Eliquis 5mg BID   Dispo: Follow up o/p w/ Dr. Alex 2-3 weeks. PT rec home w/ rolling walker. O/p f/u with Dr. Ramos to discuss possible ablation.  Patient is a 89y Female with PMHx COPD (on home 2LNC), HTN, HLD, hypothyroidism admitted for Vfib cardiac arrest s/p 12 sec CPR + defibrillation + LHC w/ CRISTINA to Ramus artery.     #Vfib arrest s/p CRISTINA to Ramus   #CAD   -Cardiology consulted   -EP consulted, rec triple therapy until 4/23  (Aspirin 81mg, Plavix 75mg, Eliquis 5mg BID), after which ASA will be discontinued  -WCD upon discharge  -Atorvastatin 80mh daily   -Losartan 25mg BID  -Aspirin 81mg daily  -Plavix 75mg daily         #New onset Afib   -Eliquis 5mg BID   -Metoprolol tartrate 25mg BID   -amiodarone 200mg q8 x 14 days (End Date 4/23)  -amiodarone 200mg daily (Start Date 4/23)  -monitoring of TFTs + LFTs q 3-6 months while on Pacerone     #JOHNATHON   -IVF   -Renal US pending  -UA pending   -Urine culture pending   -Bladder scan   -Nephrology consulted     #COPD   -home O2 2LNC         DVT ppx: Eliquis 5mg BID   Dispo: Follow up o/p w/ Dr. Alex 2-3 weeks. PT rec home w/ rolling walker. O/p f/u with Dr. Ramos to discuss possible ablation.  Patient is a 89y Female with PMHx COPD (on home 2LNC), HTN, HLD, hypothyroidism admitted for Vfib cardiac arrest s/p 12 sec CPR + defibrillation + LHC w/ CRISTINA to Ramus artery.     #Vfib arrest s/p CRISTINA to Ramus   #CAD   -Cardiology consulted   -EP consulted, rec triple therapy until 4/23  (Aspirin 81mg, Plavix 75mg, Eliquis 5mg BID), after which ASA will be discontinued  -WCD upon discharge  -Atorvastatin 80mg daily   -Losartan 25mg BID  -Aspirin 81mg daily  -Plavix 75mg daily         #New onset Afib   -Eliquis 5mg BID   -Metoprolol tartrate 25mg BID   -amiodarone 200mg q8 x 14 days (End Date 4/23)  -amiodarone 200mg daily (Start Date 4/23)  -monitoring of TFTs + LFTs q 3-6 months while on Pacerone     #JOHNATHON   -IVF   -Renal US pending  -UA pending   -Urine culture pending   -Bladder scan   -Nephrology consulted     #COPD   -home O2 2LNC   -Advair 250/50 BID  -levalbuterol 0.63mg q6 prn   -Tiotropium 2.5mcg inhaler daily     #hypothyroidism   -Synthroid 125mcg daily       GI ppx: Pantoprazole 40mg daily   DVT ppx: Eliquis 5mg BID   Dispo: Pending JOHNATHON resolution. Follow up o/p w/ Dr. Alex 2-3 weeks. PT rec home w/ rolling walker. O/p f/u with Dr. Ramos to discuss possible ablation.

## 2025-04-12 NOTE — PROGRESS NOTE ADULT - SUBJECTIVE AND OBJECTIVE BOX
HPI  Patient is a 89y Female with PMHx COPD, HTN, HLD, hypothyroidism  admitted for    INTERVAL EVENTS    HOSPITAL COURSE  Pt presented to the ED on 4/08 with chest pain with left radiation x 1 day, /75, , RR 18, O2 97% on room air, T 36.3C. EKG notable for irregular tachycardia/ Afib w/ aberrancy w/ intermittent ventricular beats.  Patient was code blue in ED for torsades/Vfib and was placed on 4LNC following 12 seconds of  CPR + defibrillation w/ 200J. EP consulted for VF arrest, rec LHC + aggressive magnesium replacement + TTE + heparin gtt for Afib RVR. Cardiology consulted, also rec LHC + asa 324mg + ntg paste. TTE showing normal LV function w/ preserved EF. Pt started on amiodarone for Afib. LHC via RRa w/ CRISTINA to Ramus Artery. JOHNATHON on 4/11 s/p IVF.     REVIEW OF SYSTEMS   General: No fatigue, decreased apatite, weight loss  HEENT: No cough, throat pain, rhinorrhea hemoptysis    Chest: No shortness of breath, chest pain  Abdomen: No abdominal pain, hematemesis   Genitourinary: No dysuria, No hematuria   Extremities: No joint pain, no change in range of motion  Skin: No rashes, ecchymoses purpura, nodules       MEDICATIONS  MEDICATIONS  (STANDING):  aMIOdarone    Tablet 200 milliGRAM(s) Oral every 8 hours  apixaban 5 milliGRAM(s) Oral two times a day  aspirin enteric coated 81 milliGRAM(s) Oral daily  atorvastatin 80 milliGRAM(s) Oral at bedtime  chlorhexidine 2% Cloths 1 Application(s) Topical <User Schedule>  clopidogrel Tablet 75 milliGRAM(s) Oral daily  fluticasone propionate/ salmeterol 250-50 MICROgram(s) Diskus 1 Dose(s) Inhalation two times a day  influenza  Vaccine (HIGH DOSE) 0.5 milliLiter(s) IntraMuscular once  levothyroxine 125 MICROGram(s) Oral daily  metoprolol tartrate 25 milliGRAM(s) Oral <User Schedule>  pantoprazole    Tablet 40 milliGRAM(s) Oral daily  sodium chloride 0.9%. 1000 milliLiter(s) (75 mL/Hr) IV Continuous <Continuous>  tiotropium 2.5 MICROgram(s) Inhaler 2 Puff(s) Inhalation daily    MEDICATIONS  (PRN):  acetaminophen     Tablet .. 650 milliGRAM(s) Oral every 6 hours PRN Temp greater or equal to 38C (100.4F), Mild Pain (1 - 3), Moderate Pain (4 - 6)  albuterol    90 MICROgram(s) HFA Inhaler 2 Puff(s) Inhalation every 6 hours PRN Shortness of Breath and/or Wheezing  hydrALAZINE Injectable 10 milliGRAM(s) IV Push every 6 hours PRN SBP > 160  levalbuterol Inhalation 0.63 milliGRAM(s) Inhalation every 6 hours PRN Shortness of Breath/Wheezing  ondansetron Injectable 4 milliGRAM(s) IV Push every 6 hours PRN Nausea and/or Vomiting      ALLERGIES  Allergies    Allergy Status Unknown    Intolerances        PHYSICAL EXAM   Vital Signs Last 24 Hrs  T(C): 37 (12 Apr 2025 04:52), Max: 37.2 (12 Apr 2025 00:30)  T(F): 98.6 (12 Apr 2025 04:52), Max: 98.9 (12 Apr 2025 00:30)  HR: 70 (12 Apr 2025 04:52) (61 - 80)  BP: 116/60 (12 Apr 2025 04:52) (104/63 - 130/71)  BP(mean): 91 (11 Apr 2025 22:19) (84 - 91)  RR: 18 (12 Apr 2025 04:52) (18 - 18)  SpO2: 99% (12 Apr 2025 04:52) (99% - 100%)    Parameters below as of 12 Apr 2025 04:52  Patient On (Oxygen Delivery Method): nasal cannula  O2 Flow (L/min): 2      T(C): 37 (04-12-25 @ 04:52), Max: 37.2 (04-12-25 @ 00:30)  HR: 70 (04-12-25 @ 04:52) (61 - 80)  BP: 116/60 (04-12-25 @ 04:52) (104/63 - 130/71)  RR: 18 (04-12-25 @ 04:52) (18 - 18)  SpO2: 99% (04-12-25 @ 04:52) (99% - 100%)    CONSTITUTIONAL: Well groomed, no apparent distress  EYES: PERRLA and symmetric, EOMI, No conjunctival or scleral injection, non-icteric  ENMT: Oral mucosa with moist membranes. Normal dentition; no pharyngeal injection or exudates             NECK: Supple, symmetric and without tracheal deviation   RESP: No respiratory distress, no use of accessory muscles; CTA b/l, no WRR  CV: RRR, +S1S2, no MRG; no JVD; no peripheral edema  GI: Soft, NT, ND, no rebound, no guarding; no palpable masses; no hepatosplenomegaly; no hernia palpated  LYMPH: No cervical LAD or tenderness; no axillary LAD or tenderness; no inguinal LAD or tenderness  MSK: Normal gait; No digital clubbing or cyanosis; examination of the (head/neck/spine/ribs/pelvis, RUE, LUE, RLE, LLE) without misalignment,            Normal ROM without pain, no spinal tenderness, normal muscle strength/tone  SKIN: No rashes or ulcers noted; no subcutaneous nodules or induration palpable  NEURO: CN II-XII intact; normal reflexes in upper and lower extremities, sensation intact in upper and lower extremities b/l to light touch   PSYCH: Appropriate insight/judgment; A+O x 3, mood and affect appropriate, recent/remote memory intact      LABS                        10.8   12.76 )-----------( 167      ( 12 Apr 2025 06:33 )             35.6       CBC Full  -  ( 12 Apr 2025 06:33 )  WBC Count : 12.76 K/uL  RBC Count : 3.43 M/uL  Hemoglobin : 10.8 g/dL  Hematocrit : 35.6 %  Platelet Count - Automated : 167 K/uL  Mean Cell Volume : 103.8 fl  Mean Cell Hemoglobin : 31.5 pg  Mean Cell Hemoglobin Concentration : 30.3 g/dL  Auto Neutrophil # : 9.25 K/uL  Auto Lymphocyte # : 1.15 K/uL  Auto Monocyte # : 1.78 K/uL  Auto Eosinophil # : 0.50 K/uL  Auto Basophil # : 0.03 K/uL  Auto Neutrophil % : 72.6 %  Auto Lymphocyte % : 9.0 %  Auto Monocyte % : 13.9 %  Auto Eosinophil % : 3.9 %  Auto Basophil % : 0.2 %      04-12    140  |  106  |  42.1[H]  ----------------------------<  100[H]  4.4   |  21.0[L]  |  1.97[H]    Ca    7.9[L]      12 Apr 2025 06:33  Phos  5.2     04-11  Mg     1.9     04-12    TPro  5.9[L]  /  Alb  3.4  /  TBili  0.6  /  DBili  x   /  AST  42[H]  /  ALT  26  /  AlkPhos  50  04-11      CAPILLARY BLOOD GLUCOSE              Urinalysis Basic - ( 12 Apr 2025 06:33 )    Color: x / Appearance: x / SG: x / pH: x  Gluc: 100 mg/dL / Ketone: x  / Bili: x / Urobili: x   Blood: x / Protein: x / Nitrite: x   Leuk Esterase: x / RBC: x / WBC x   Sq Epi: x / Non Sq Epi: x / Bacteria: x        IMAGING          HPI  Patient is a 89y Female with PMHx COPD (on home 2LNC), HTN, HLD, hypothyroidism admitted for Vfib cardiac arrest s/p 12 sec CPR + defibrillation + LHC w/ CRISTINA to Ramus artery.     INTERVAL EVENTS  Nephrology consulted for worsening JOHNATHON.     HOSPITAL COURSE  Pt presented to the ED on 4/08 with chest pain with left radiation x 1 day, /75, , RR 18, O2 97% on room air, T 36.3C. EKG notable for irregular tachycardia/ Afib w/ aberrancy w/ intermittent ventricular beats.  Patient was code blue in ED for torsades/Vfib and was placed on 4LNC following 12 seconds of  CPR + defibrillation w/ 200J. EP consulted for VF arrest, rec LHC + aggressive magnesium replacement + TTE + heparin gtt for Afib RVR. Cardiology consulted, also rec LHC + asa 324mg + ntg paste. TTE showing normal LV function w/ preserved EF. Pt started on amiodarone for Afib. LHC via RRa w/ CRISTINA to Ramus Artery. JOHNATHON on 4/11 s/p IVF.     REVIEW OF SYSTEMS Denies the following   General: No fatigue, decreased apatite, weight loss  HEENT: No cough, throat pain, rhinorrhea hemoptysis    Chest: No shortness of breath, chest pain  Abdomen: No abdominal pain, hematemesis   Genitourinary: No dysuria, No hematuria   Extremities: No joint pain, no change in range of motion  Skin: No rashes, ecchymoses purpura, nodules       MEDICATIONS  MEDICATIONS  (STANDING):  aMIOdarone    Tablet 200 milliGRAM(s) Oral every 8 hours  apixaban 5 milliGRAM(s) Oral two times a day  aspirin enteric coated 81 milliGRAM(s) Oral daily  atorvastatin 80 milliGRAM(s) Oral at bedtime  chlorhexidine 2% Cloths 1 Application(s) Topical <User Schedule>  clopidogrel Tablet 75 milliGRAM(s) Oral daily  fluticasone propionate/ salmeterol 250-50 MICROgram(s) Diskus 1 Dose(s) Inhalation two times a day  influenza  Vaccine (HIGH DOSE) 0.5 milliLiter(s) IntraMuscular once  levothyroxine 125 MICROGram(s) Oral daily  metoprolol tartrate 25 milliGRAM(s) Oral <User Schedule>  pantoprazole    Tablet 40 milliGRAM(s) Oral daily  sodium chloride 0.9%. 1000 milliLiter(s) (75 mL/Hr) IV Continuous <Continuous>  tiotropium 2.5 MICROgram(s) Inhaler 2 Puff(s) Inhalation daily    MEDICATIONS  (PRN):  acetaminophen     Tablet .. 650 milliGRAM(s) Oral every 6 hours PRN Temp greater or equal to 38C (100.4F), Mild Pain (1 - 3), Moderate Pain (4 - 6)  albuterol    90 MICROgram(s) HFA Inhaler 2 Puff(s) Inhalation every 6 hours PRN Shortness of Breath and/or Wheezing  hydrALAZINE Injectable 10 milliGRAM(s) IV Push every 6 hours PRN SBP > 160  levalbuterol Inhalation 0.63 milliGRAM(s) Inhalation every 6 hours PRN Shortness of Breath/Wheezing  ondansetron Injectable 4 milliGRAM(s) IV Push every 6 hours PRN Nausea and/or Vomiting      ALLERGIES  Allergies    Allergy Status Unknown    Intolerances        PHYSICAL EXAM   Vital Signs Last 24 Hrs  T(C): 37 (12 Apr 2025 04:52), Max: 37.2 (12 Apr 2025 00:30)  T(F): 98.6 (12 Apr 2025 04:52), Max: 98.9 (12 Apr 2025 00:30)  HR: 70 (12 Apr 2025 04:52) (61 - 80)  BP: 116/60 (12 Apr 2025 04:52) (104/63 - 130/71)  BP(mean): 91 (11 Apr 2025 22:19) (84 - 91)  RR: 18 (12 Apr 2025 04:52) (18 - 18)  SpO2: 99% (12 Apr 2025 04:52) (99% - 100%)    Parameters below as of 12 Apr 2025 04:52  Patient On (Oxygen Delivery Method): nasal cannula  O2 Flow (L/min): 2      T(C): 37 (04-12-25 @ 04:52), Max: 37.2 (04-12-25 @ 00:30)  HR: 70 (04-12-25 @ 04:52) (61 - 80)  BP: 116/60 (04-12-25 @ 04:52) (104/63 - 130/71)  RR: 18 (04-12-25 @ 04:52) (18 - 18)  SpO2: 99% (04-12-25 @ 04:52) (99% - 100%)    CONSTITUTIONAL: Well groomed, no apparent distress, sitting in bed comfortably   EYES: PERRLA and symmetric, EOMI, No conjunctival or scleral injection, non-icteric  ENMT: Oral mucosa with moist membranes.  NECK: symmetric and without tracheal deviation   RESP: No respiratory distress, no use of accessory muscles; on 2lNC   CV:  no JVD; no peripheral edema  GI: no rebound, no guarding  MSK: No digital clubbing or cyanosis  SKIN: No rashes or ulcers noted  PSYCH: Appropriate insight/judgment; A+O x 4, mood and affect appropriate, recent/remote memory intact      LABS                        10.8   12.76 )-----------( 167      ( 12 Apr 2025 06:33 )             35.6       CBC Full  -  ( 12 Apr 2025 06:33 )  WBC Count : 12.76 K/uL  RBC Count : 3.43 M/uL  Hemoglobin : 10.8 g/dL  Hematocrit : 35.6 %  Platelet Count - Automated : 167 K/uL  Mean Cell Volume : 103.8 fl  Mean Cell Hemoglobin : 31.5 pg  Mean Cell Hemoglobin Concentration : 30.3 g/dL  Auto Neutrophil # : 9.25 K/uL  Auto Lymphocyte # : 1.15 K/uL  Auto Monocyte # : 1.78 K/uL  Auto Eosinophil # : 0.50 K/uL  Auto Basophil # : 0.03 K/uL  Auto Neutrophil % : 72.6 %  Auto Lymphocyte % : 9.0 %  Auto Monocyte % : 13.9 %  Auto Eosinophil % : 3.9 %  Auto Basophil % : 0.2 %      04-12    140  |  106  |  42.1[H]  ----------------------------<  100[H]  4.4   |  21.0[L]  |  1.97[H]    Ca    7.9[L]      12 Apr 2025 06:33  Phos  5.2     04-11  Mg     1.9     04-12    TPro  5.9[L]  /  Alb  3.4  /  TBili  0.6  /  DBili  x   /  AST  42[H]  /  ALT  26  /  AlkPhos  50  04-11      CAPILLARY BLOOD GLUCOSE              Urinalysis Basic - ( 12 Apr 2025 06:33 )    Color: x / Appearance: x / SG: x / pH: x  Gluc: 100 mg/dL / Ketone: x  / Bili: x / Urobili: x   Blood: x / Protein: x / Nitrite: x   Leuk Esterase: x / RBC: x / WBC x   Sq Epi: x / Non Sq Epi: x / Bacteria: x        IMAGING

## 2025-04-12 NOTE — CONSULT NOTE ADULT - SUBJECTIVE AND OBJECTIVE BOX
History of present illness: Patient is a 89-year-old female with past medical history significant for hypertension, hyperlipidemia, COPD on home oxygen who presented to ER on 4/8 with chest pain and had V-fib cardiac arrest in the ED.  She achieved ROSC after 12 seconds CPR plus defibrillation.  LHC done with CRISTINA to ramus.  Serum creatinine closer to 1 on a baseline is up to 1.9 today for which nephrology consult was placed.  When seen patient denies any urinary or cardiac complaints.  She denied increased urination frequency, dysuria, gross hematuria, fever, chills, skin rash or itching, nausea, vomiting, diarrhea, bleeding problems and joint pain or swelling. Review of other systems was negative.    Review of systems: All systems were reviewed in detail, pertinent positive and negative have been mentioned above, otherwise negative.  Past medical and surgical history: Hypertension, hyperlipidemia, COPD, hypothyroidism  Allergies and Intolerances:        Allergies:  	Allergy Status Unknown:     Home Medications:   * Patient Currently Takes Medications as of 19-Feb-2024 10:11 documented in Structured Notes  · 	folic acid 1 mg oral tablet: 1 tab(s) orally once a day  · 	predniSONE 20 mg oral tablet: 2 tab(s) orally once a day  · 	losartan-hydrochlorothiazide 50 mg-12.5 mg oral tablet: 1 tab(s) orally once a day  · 	levothyroxine 112 mcg (0.112 mg) oral tablet: 1 tab(s) orally once a day  · 	Advair Diskus 250 mcg-50 mcg inhalation powder: 1 inhaled once a day  · 	simvastatin 20 mg oral tablet: 1 tab(s) orally once a day (at bedtime)  · 	Optimal-D3 1250 mcg (50,000 intl units) oral capsule: 1 cap(s) orally every 7 days  · 	Zetia 10 mg oral tablet: 1 tab(s) orally once a day  · 	Spiriva 18 mcg inhalation capsule: 1 cap(s) inhaled once a day  Family history: No pertinent family history of renal disease or electrolyte disorder in first degree relatives.  Social history: Denies tobacco, alcohol, drug abuse.     Vital Signs Last 24 Hrs  T(C): 36.6 (12 Apr 2025 16:34), Max: 37.2 (12 Apr 2025 00:30)  T(F): 97.9 (12 Apr 2025 16:34), Max: 98.9 (12 Apr 2025 00:30)  HR: 86 (12 Apr 2025 16:34) (61 - 86)  BP: 126/74 (12 Apr 2025 16:34) (104/63 - 130/71)  BP(mean): 91 (11 Apr 2025 22:19) (84 - 91)  RR: 18 (12 Apr 2025 16:34) (18 - 18)  SpO2: 93% (12 Apr 2025 16:34) (93% - 100%)  Parameters below as of 12 Apr 2025 16:34  Patient On (Oxygen Delivery Method): nasal cannula  O2 Flow (L/min): 2    Physical Exam:  Gen: no acute distress, on nasal cannula  MS: alert, conversing normally  HENT: NCAT, MMM  CV: S1-S2 normal, no LE edema  Chest: Scattered wheezing  Abd: soft, NT, ND  Skin: dry, warm, no rash or jaundice    04-12    140  |  106  |  42.1[H]  ----------------------------<  100[H]  4.4   |  21.0[L]  |  1.97[H]    Ca    7.9[L]      12 Apr 2025 06:33  Phos  5.2     04-11  Mg     1.9     04-12    TPro  5.9[L]  /  Alb  3.4  /  TBili  0.6  /  DBili  x   /  AST  42[H]  /  ALT  26  /  AlkPhos  50  04-11                        10.8   12.76 )-----------( 167      ( 12 Apr 2025 06:33 )             35.6     Imaging reviewed History of present illness: Patient is a 89-year-old female with past medical history significant for hypertension, hyperlipidemia, COPD on home oxygen who presented to ER on 4/8 with chest pain and had V-fib cardiac arrest in the ED.  She achieved ROSC after 12 seconds CPR plus defibrillation.  LHC done with CRISTINA to ramus.  Serum creatinine closer to 1 on a baseline is up to 1.9 today for which nephrology consult was placed.  When seen patient denies any urinary or cardiac complaints.  She denied increased urination frequency, dysuria, gross hematuria, fever, chills, skin rash or itching, nausea, vomiting, diarrhea, bleeding problems and joint pain or swelling. Review of other systems was negative.    Review of systems: All systems were reviewed in detail, pertinent positive and negative have been mentioned above, otherwise negative.  Past medical and surgical history: Hypertension, hyperlipidemia, COPD, hypothyroidism  Allergies and Intolerances:        Allergies:  	Allergy Status Unknown:     Home Medications:   * Patient Currently Takes Medications as of 19-Feb-2024 10:11 documented in Structured Notes  · 	folic acid 1 mg oral tablet: 1 tab(s) orally once a day  · 	predniSONE 20 mg oral tablet: 2 tab(s) orally once a day  · 	losartan-hydrochlorothiazide 50 mg-12.5 mg oral tablet: 1 tab(s) orally once a day  · 	levothyroxine 112 mcg (0.112 mg) oral tablet: 1 tab(s) orally once a day  · 	Advair Diskus 250 mcg-50 mcg inhalation powder: 1 inhaled once a day  · 	simvastatin 20 mg oral tablet: 1 tab(s) orally once a day (at bedtime)  · 	Optimal-D3 1250 mcg (50,000 intl units) oral capsule: 1 cap(s) orally every 7 days  · 	Zetia 10 mg oral tablet: 1 tab(s) orally once a day  · 	Spiriva 18 mcg inhalation capsule: 1 cap(s) inhaled once a day  Family history: No pertinent family history of renal disease or electrolyte disorder in first degree relatives.  Social history: Denies tobacco, alcohol, drug abuse.     MEDICATIONS  (STANDING):  aMIOdarone    Tablet 200 milliGRAM(s) Oral every 8 hours  apixaban 5 milliGRAM(s) Oral two times a day  aspirin enteric coated 81 milliGRAM(s) Oral daily  atorvastatin 80 milliGRAM(s) Oral at bedtime  chlorhexidine 2% Cloths 1 Application(s) Topical <User Schedule>  clopidogrel Tablet 75 milliGRAM(s) Oral daily  fluticasone propionate/ salmeterol 250-50 MICROgram(s) Diskus 1 Dose(s) Inhalation two times a day  influenza  Vaccine (HIGH DOSE) 0.5 milliLiter(s) IntraMuscular once  levothyroxine 125 MICROGram(s) Oral daily  metoprolol tartrate 25 milliGRAM(s) Oral <User Schedule>  pantoprazole    Tablet 40 milliGRAM(s) Oral daily  sodium chloride 0.9%. 1000 milliLiter(s) (75 mL/Hr) IV Continuous <Continuous>  tiotropium 2.5 MICROgram(s) Inhaler 2 Puff(s) Inhalation daily    MEDICATIONS  (PRN):  acetaminophen     Tablet .. 650 milliGRAM(s) Oral every 6 hours PRN Temp greater or equal to 38C (100.4F), Mild Pain (1 - 3), Moderate Pain (4 - 6)  albuterol    90 MICROgram(s) HFA Inhaler 2 Puff(s) Inhalation every 6 hours PRN Shortness of Breath and/or Wheezing  hydrALAZINE Injectable 10 milliGRAM(s) IV Push every 6 hours PRN SBP > 160  levalbuterol Inhalation 0.63 milliGRAM(s) Inhalation every 6 hours PRN Shortness of Breath/Wheezing  ondansetron Injectable 4 milliGRAM(s) IV Push every 6 hours PRN Nausea and/or Vomiting    Vital Signs Last 24 Hrs  T(C): 36.6 (12 Apr 2025 16:34), Max: 37.2 (12 Apr 2025 00:30)  T(F): 97.9 (12 Apr 2025 16:34), Max: 98.9 (12 Apr 2025 00:30)  HR: 86 (12 Apr 2025 16:34) (61 - 86)  BP: 126/74 (12 Apr 2025 16:34) (104/63 - 130/71)  BP(mean): 91 (11 Apr 2025 22:19) (84 - 91)  RR: 18 (12 Apr 2025 16:34) (18 - 18)  SpO2: 93% (12 Apr 2025 16:34) (93% - 100%)  Parameters below as of 12 Apr 2025 16:34  Patient On (Oxygen Delivery Method): nasal cannula  O2 Flow (L/min): 2    Physical Exam:  Gen: no acute distress, on nasal cannula  MS: alert, conversing normally  HENT: NCAT, MMM  CV: S1-S2 normal, no LE edema  Chest: Scattered wheezing  Abd: soft, NT, ND  Skin: dry, warm, no rash or jaundice    04-12    140  |  106  |  42.1[H]  ----------------------------<  100[H]  4.4   |  21.0[L]  |  1.97[H]    Ca    7.9[L]      12 Apr 2025 06:33  Phos  5.2     04-11  Mg     1.9     04-12    TPro  5.9[L]  /  Alb  3.4  /  TBili  0.6  /  DBili  x   /  AST  42[H]  /  ALT  26  /  AlkPhos  50  04-11                        10.8   12.76 )-----------( 167      ( 12 Apr 2025 06:33 )             35.6     Imaging reviewed

## 2025-04-12 NOTE — PROGRESS NOTE ADULT - ATTENDING COMMENTS
Agree with above   Patient seen and examined together with medical residents   Vital signs,  labs and imaging  reviewed   Assesment and plan discussed        89y Female with PMHx COPD (on home 2LNC), HTN, HLD, hypothyroidism admitted for Vfib cardiac arrest s/p 12 sec CPR + defibrillation + LHC w/ CRISTINA to Ramus artery.     #Vfib arrest s/p CRISTINA to Ramus   #CAD   #Newonset Afib  C/w current reg  [facility]  Outpt Cards follow up    JOHNATHON  Worsening  Likely 2/2 diarrhea  Hold Losartan  IVF  Check UA, bladder scan, renal US  Monitor BMP  Nephro eval

## 2025-04-12 NOTE — CONSULT NOTE ADULT - ASSESSMENT
89 YOF HTN, HLD, COPD presented with chest pain and sustained a V-fib cardiac arrest    Acute kidney injury  V-fib cardiac arrest  Coronary artery disease  A-fib with RVR  Hypertension  Hyperlipidemia  Hypothyroidism    ·	Baseline serum creatinine 1  ·	Elevated to 1.97 mg/deciliter today  ·	UA bland  ·	JOHNATHON likely ATN postcardiac arrest/A-fib with RVR versus contrast-induced nephropathy post LHC  ·	Patient nonoliguric  ·	Expecting serum creatinine to hit a plateau soon and improved  ·	Volume status euvolemic no need for diuretics  ·	Continue with conservative management of maintaining MAP more than 65 and avoiding nephrotoxins  ·	Blood pressure controlled, continue current medication 89 YOF HTN, HLD, COPD presented with chest pain and sustained a V-fib cardiac arrest    Acute kidney injury  V-fib cardiac arrest  Coronary artery disease  A-fib with RVR  Hypertension  Hyperlipidemia  Hypothyroidism  COPD on home O2    ·	Baseline serum creatinine 1  ·	Elevated to 1.97 mg/deciliter today  ·	UA bland  ·	JOHNATHON likely ATN postcardiac arrest/A-fib with RVR versus contrast-induced nephropathy post LHC  ·	Patient nonoliguric  ·	Expecting serum creatinine to hit a plateau soon and improved  ·	Volume status euvolemic no need for diuretics  ·	Continue with conservative management of maintaining MAP more than 65 and avoiding nephrotoxins  ·	Blood pressure controlled, continue current medication  ·	Continue with atorvastatin 80 mg daily

## 2025-04-13 LAB
ALBUMIN SERPL ELPH-MCNC: 3.3 G/DL — SIGNIFICANT CHANGE UP (ref 3.3–5.2)
ALP SERPL-CCNC: 46 U/L — SIGNIFICANT CHANGE UP (ref 40–120)
ALT FLD-CCNC: 17 U/L — SIGNIFICANT CHANGE UP
ANION GAP SERPL CALC-SCNC: 10 MMOL/L — SIGNIFICANT CHANGE UP (ref 5–17)
AST SERPL-CCNC: 22 U/L — SIGNIFICANT CHANGE UP
BASOPHILS # BLD AUTO: 0.04 K/UL — SIGNIFICANT CHANGE UP (ref 0–0.2)
BASOPHILS NFR BLD AUTO: 0.4 % — SIGNIFICANT CHANGE UP (ref 0–2)
BILIRUB SERPL-MCNC: 0.7 MG/DL — SIGNIFICANT CHANGE UP (ref 0.4–2)
BUN SERPL-MCNC: 40.7 MG/DL — HIGH (ref 8–20)
CALCIUM SERPL-MCNC: 8.1 MG/DL — LOW (ref 8.4–10.5)
CHLORIDE SERPL-SCNC: 104 MMOL/L — SIGNIFICANT CHANGE UP (ref 96–108)
CO2 SERPL-SCNC: 25 MMOL/L — SIGNIFICANT CHANGE UP (ref 22–29)
CREAT SERPL-MCNC: 1.77 MG/DL — HIGH (ref 0.5–1.3)
EGFR: 27 ML/MIN/1.73M2 — LOW
EGFR: 27 ML/MIN/1.73M2 — LOW
EOSINOPHIL # BLD AUTO: 0.38 K/UL — SIGNIFICANT CHANGE UP (ref 0–0.5)
EOSINOPHIL NFR BLD AUTO: 3.4 % — SIGNIFICANT CHANGE UP (ref 0–6)
GI PCR PANEL: SIGNIFICANT CHANGE UP
GLUCOSE SERPL-MCNC: 89 MG/DL — SIGNIFICANT CHANGE UP (ref 70–99)
HCT VFR BLD CALC: 36.2 % — SIGNIFICANT CHANGE UP (ref 34.5–45)
HGB BLD-MCNC: 10.7 G/DL — LOW (ref 11.5–15.5)
IMM GRANULOCYTES # BLD AUTO: 0.05 K/UL — SIGNIFICANT CHANGE UP (ref 0–0.07)
IMM GRANULOCYTES NFR BLD AUTO: 0.4 % — SIGNIFICANT CHANGE UP (ref 0–0.9)
LYMPHOCYTES # BLD AUTO: 1.67 K/UL — SIGNIFICANT CHANGE UP (ref 1–3.3)
LYMPHOCYTES NFR BLD AUTO: 14.8 % — SIGNIFICANT CHANGE UP (ref 13–44)
MAGNESIUM SERPL-MCNC: 1.9 MG/DL — SIGNIFICANT CHANGE UP (ref 1.6–2.6)
MCHC RBC-ENTMCNC: 29.6 G/DL — LOW (ref 32–36)
MCHC RBC-ENTMCNC: 31.1 PG — SIGNIFICANT CHANGE UP (ref 27–34)
MCV RBC AUTO: 105.2 FL — HIGH (ref 80–100)
MONOCYTES # BLD AUTO: 1.71 K/UL — HIGH (ref 0–0.9)
MONOCYTES NFR BLD AUTO: 15.2 % — HIGH (ref 2–14)
NEUTROPHILS # BLD AUTO: 7.43 K/UL — HIGH (ref 1.8–7.4)
NEUTROPHILS NFR BLD AUTO: 65.8 % — SIGNIFICANT CHANGE UP (ref 43–77)
NRBC # BLD AUTO: 0 K/UL — SIGNIFICANT CHANGE UP (ref 0–0)
NRBC # FLD: 0 K/UL — SIGNIFICANT CHANGE UP (ref 0–0)
NRBC BLD AUTO-RTO: 0 /100 WBCS — SIGNIFICANT CHANGE UP (ref 0–0)
PLATELET # BLD AUTO: 185 K/UL — SIGNIFICANT CHANGE UP (ref 150–400)
PMV BLD: 11.3 FL — SIGNIFICANT CHANGE UP (ref 7–13)
POTASSIUM SERPL-MCNC: 4.9 MMOL/L — SIGNIFICANT CHANGE UP (ref 3.5–5.3)
POTASSIUM SERPL-SCNC: 4.9 MMOL/L — SIGNIFICANT CHANGE UP (ref 3.5–5.3)
PROT SERPL-MCNC: 5.9 G/DL — LOW (ref 6.6–8.7)
RBC # BLD: 3.44 M/UL — LOW (ref 3.8–5.2)
RBC # FLD: 12.8 % — SIGNIFICANT CHANGE UP (ref 10.3–14.5)
SODIUM SERPL-SCNC: 139 MMOL/L — SIGNIFICANT CHANGE UP (ref 135–145)
WBC # BLD: 11.28 K/UL — HIGH (ref 3.8–10.5)
WBC # FLD AUTO: 11.28 K/UL — HIGH (ref 3.8–10.5)

## 2025-04-13 PROCEDURE — 99233 SBSQ HOSP IP/OBS HIGH 50: CPT

## 2025-04-13 PROCEDURE — 76775 US EXAM ABDO BACK WALL LIM: CPT | Mod: 26

## 2025-04-13 PROCEDURE — 99232 SBSQ HOSP IP/OBS MODERATE 35: CPT

## 2025-04-13 RX ADMIN — Medication 1 DOSE(S): at 08:37

## 2025-04-13 RX ADMIN — AMIODARONE HYDROCHLORIDE 200 MILLIGRAM(S): 50 INJECTION, SOLUTION INTRAVENOUS at 08:37

## 2025-04-13 RX ADMIN — AMIODARONE HYDROCHLORIDE 200 MILLIGRAM(S): 50 INJECTION, SOLUTION INTRAVENOUS at 17:18

## 2025-04-13 RX ADMIN — METOPROLOL SUCCINATE 25 MILLIGRAM(S): 50 TABLET, EXTENDED RELEASE ORAL at 22:18

## 2025-04-13 RX ADMIN — Medication 1 APPLICATION(S): at 05:04

## 2025-04-13 RX ADMIN — METOPROLOL SUCCINATE 25 MILLIGRAM(S): 50 TABLET, EXTENDED RELEASE ORAL at 10:32

## 2025-04-13 RX ADMIN — ATORVASTATIN CALCIUM 80 MILLIGRAM(S): 80 TABLET, FILM COATED ORAL at 22:18

## 2025-04-13 RX ADMIN — AMIODARONE HYDROCHLORIDE 200 MILLIGRAM(S): 50 INJECTION, SOLUTION INTRAVENOUS at 00:02

## 2025-04-13 RX ADMIN — APIXABAN 5 MILLIGRAM(S): 2.5 TABLET, FILM COATED ORAL at 05:04

## 2025-04-13 RX ADMIN — APIXABAN 5 MILLIGRAM(S): 2.5 TABLET, FILM COATED ORAL at 17:18

## 2025-04-13 RX ADMIN — Medication 81 MILLIGRAM(S): at 12:17

## 2025-04-13 RX ADMIN — Medication 40 MILLIGRAM(S): at 12:17

## 2025-04-13 RX ADMIN — Medication 125 MICROGRAM(S): at 05:04

## 2025-04-13 RX ADMIN — TIOTROPIUM BROMIDE INHALATION SPRAY 2 PUFF(S): 3.12 SPRAY, METERED RESPIRATORY (INHALATION) at 08:37

## 2025-04-13 RX ADMIN — LEVALBUTEROL HYDROCHLORIDE 0.63 MILLIGRAM(S): 1.25 SOLUTION RESPIRATORY (INHALATION) at 02:48

## 2025-04-13 RX ADMIN — CLOPIDOGREL BISULFATE 75 MILLIGRAM(S): 75 TABLET, FILM COATED ORAL at 12:17

## 2025-04-13 NOTE — PROGRESS NOTE ADULT - SUBJECTIVE AND OBJECTIVE BOX
Reason for visit: Acute kidney injury    Subjective/last 24-hour: Patient feeling okay, denies any cardiac or urinary complaints.  Review of systems: All systems were reviewed in detail, pertinent positive and negative have been mentioned above, otherwise negative.    Physical Exam:  Gen: no acute distress, on nasal cannula  MS: alert, conversing normally  HENT: NCAT, MMM  CV: S1-S2 normal, no LE edema  Chest: Scattered wheezing  Abd: soft, NT, ND  Skin: dry, warm, no rash or jaundice    Vital Signs Last 24 Hrs  T(C): 36.6 (13 Apr 2025 10:29), Max: 37.2 (12 Apr 2025 23:59)  T(F): 97.9 (13 Apr 2025 10:29), Max: 99 (12 Apr 2025 23:59)  HR: 76 (13 Apr 2025 10:29) (67 - 86)  BP: 112/61 (13 Apr 2025 10:29) (112/61 - 129/56)  BP(mean): --  RR: 18 (13 Apr 2025 10:29) (18 - 18)  SpO2: 94% (13 Apr 2025 10:29) (92% - 99%)    Parameters below as of 13 Apr 2025 10:29  Patient On (Oxygen Delivery Method): room air      I&O's Summary    12 Apr 2025 07:01  -  13 Apr 2025 07:00  --------------------------------------------------------  IN: 0 mL / OUT: 250 mL / NET: -250 mL      Current Antibiotics:    Other medications:  aMIOdarone    Tablet 200 milliGRAM(s) Oral every 8 hours  apixaban 5 milliGRAM(s) Oral two times a day  aspirin enteric coated 81 milliGRAM(s) Oral daily  atorvastatin 80 milliGRAM(s) Oral at bedtime  chlorhexidine 2% Cloths 1 Application(s) Topical <User Schedule>  clopidogrel Tablet 75 milliGRAM(s) Oral daily  fluticasone propionate/ salmeterol 250-50 MICROgram(s) Diskus 1 Dose(s) Inhalation two times a day  influenza  Vaccine (HIGH DOSE) 0.5 milliLiter(s) IntraMuscular once  levothyroxine 125 MICROGram(s) Oral daily  metoprolol tartrate 25 milliGRAM(s) Oral <User Schedule>  pantoprazole    Tablet 40 milliGRAM(s) Oral daily  sodium chloride 0.9%. 1000 milliLiter(s) IV Continuous <Continuous>  tiotropium 2.5 MICROgram(s) Inhaler 2 Puff(s) Inhalation daily    04-13    139  |  104  |  40.7[H]  ----------------------------<  89  4.9   |  25.0  |  1.77[H]    Ca    8.1[L]      13 Apr 2025 06:55  Mg     1.9     04-13    TPro  5.9[L]  /  Alb  3.3  /  TBili  0.7  /  DBili  x   /  AST  22  /  ALT  17  /  AlkPhos  46  04-13    Creatinine: 1.77 mg/dL (04-13-25 @ 06:55)  Creatinine: 1.97 mg/dL (04-12-25 @ 06:33)  Creatinine: 1.56 mg/dL (04-11-25 @ 05:45)  Creatinine: 0.96 mg/dL (04-10-25 @ 04:50)  Creatinine: 0.93 mg/dL (04-09-25 @ 05:21)  Creatinine: 1.21 mg/dL (04-08-25 @ 12:45)                          10.7   11.28 )-----------( 185      ( 13 Apr 2025 06:55 )             36.2

## 2025-04-13 NOTE — PROGRESS NOTE ADULT - ASSESSMENT
89y Female with PMHx COPD (on home 2LNC), HTN, HLD, hypothyroidism admitted for Vfib cardiac arrest s/p 12 sec CPR + defibrillation + LHC w/ CRISTINA to Ramus artery.     #Vfib arrest s/p CRISTINA to Ramus   #CAD   #Newonset Afib  C/w current reg  Lifevest  Outpt Cards follow up    JOHNATHON  Mildly improving  Likely 2/2 diarrhea vs constrast nephropathy  Hold Losartan  IVF  UA nil   bladder scan, renal US  Monitor BMP  Nephro following    Time-based billing (NON-critical care).     55 minutes spent on total encounter. The necessity of the time spent during the encounter on this date of service was due to:     I have spent 55 minutes of time on the encounter which excludes teaching and separately reported services. Time spent reviewing the chart documentation, revieweing labs and imaging studies, evaluating the patient, discussing the plan of care with the consultants & medical team, and documenting.

## 2025-04-13 NOTE — PROGRESS NOTE ADULT - TIME BILLING
Time spent reviewing the chart documentation, reviewing labs and imaging studies, evaluating the patient, discussing the plan of care with the consultants & medical team, and documenting.
Time spent reviewing the chart documentation, reviewing labs and imaging studies, evaluating the patient, clinical exam, discussing the plan of care with the medical team and/or all necessary consultants, and documenting.
Reviewing charts, coordinating care, discussing with Cardio, Patient and RN.
I have spent 55 minutes of time on the encounter which exlcudes teaching and separately reported services. Time spent reviewing the chart documentation, revieweing labs and imaging studies, evaluating the patient, discussing the plan of care with the consultants & medical team, and documenting.

## 2025-04-13 NOTE — PROGRESS NOTE ADULT - ASSESSMENT
89 YOF HTN, HLD, COPD presented with chest pain and sustained a V-fib cardiac arrest    Acute kidney injury  V-fib cardiac arrest  Coronary artery disease  A-fib with RVR  Hypertension  Hyperlipidemia  Hypothyroidism  COPD on home O2    ·	Baseline serum creatinine 1  ·	UA bland, Renal ultrasound unremarkable  ·	JOHNATHON likely ATN postcardiac arrest/A-fib with RVR (Also got IV contrast)  ·	Patient nonoliguric  ·	Serum creatinine slightly better at 1.77, peaked at 1.97.  Expecting it to continue improve from here  ·	Volume status euvolemic no need for diuretics  ·	Continue with conservative management of maintaining MAP more than 65 and avoiding nephrotoxins  ·	Blood pressure controlled, continue current medication  ·	A-fib rate controlled continue with amiodarone and apixaban  ·	Continue with atorvastatin 80 mg daily

## 2025-04-13 NOTE — PROGRESS NOTE ADULT - SUBJECTIVE AND OBJECTIVE BOX
KASSIDY GARAY  89y  Female      Patient is a 89y old  Female who presents with a chief complaint of VF (09 Apr 2025 09:57)      INTERVAL HPI/OVERNIGHT EVENTS:  Seen and examined, no complaints  Making urine  D/w dtr Kassidy on the phone      REVIEW OF SYSTEMS:  CONSTITUTIONAL: No fever, weight loss, or fatigue  EYES: No eye pain, visual disturbances, or discharge  ENMT:  No difficulty hearing, tinnitus, vertigo; No sinus or throat pain  NECK: No pain or stiffness  BREASTS: No pain, masses, or nipple discharge  RESPIRATORY: No cough, wheezing, chills or hemoptysis; No shortness of breath  CARDIOVASCULAR: No chest pain, palpitations, dizziness, or leg swelling  GASTROINTESTINAL: No abdominal or epigastric pain. No nausea, vomiting, or hematemesis; No diarrhea or constipation. No melena or hematochezia.  GENITOURINARY: No dysuria, frequency, hematuria, or incontinence  NEUROLOGICAL: No headaches, memory loss, loss of strength, numbness, or tremors  SKIN: No itching, burning, rashes, or lesions   LYMPH NODES: No enlarged glands  ENDOCRINE: No heat or cold intolerance; No hair loss  MUSCULOSKELETAL: No joint pain or swelling; No muscle, back, or extremity pain  PSYCHIATRIC: No depression, anxiety, mood swings, or difficulty sleeping  HEME/LYMPH: No easy bruising, or bleeding gums  ALLERY AND IMMUNOLOGIC: No hives or eczema    T(C): 36.6 (04-13-25 @ 10:29), Max: 37.2 (04-12-25 @ 23:59)  HR: 76 (04-13-25 @ 10:29) (67 - 86)  BP: 112/61 (04-13-25 @ 10:29) (112/61 - 129/56)  RR: 18 (04-13-25 @ 10:29) (18 - 18)  SpO2: 94% (04-13-25 @ 10:29) (92% - 99%)  Wt(kg): --Vital Signs Last 24 Hrs  T(C): 36.6 (13 Apr 2025 10:29), Max: 37.2 (12 Apr 2025 23:59)  T(F): 97.9 (13 Apr 2025 10:29), Max: 99 (12 Apr 2025 23:59)  HR: 76 (13 Apr 2025 10:29) (67 - 86)  BP: 112/61 (13 Apr 2025 10:29) (112/61 - 129/56)  BP(mean): --  RR: 18 (13 Apr 2025 10:29) (18 - 18)  SpO2: 94% (13 Apr 2025 10:29) (92% - 99%)    Parameters below as of 13 Apr 2025 10:29  Patient On (Oxygen Delivery Method): room air        PHYSICAL EXAM:  CONSTITUTIONAL: Well groomed, no apparent distress, sitting in bed comfortably   EYES: PERRLA and symmetric, EOMI, No conjunctival or scleral injection, non-icteric  ENMT: Oral mucosa with moist membranes.  NECK: symmetric and without tracheal deviation   RESP: No respiratory distress, no use of accessory muscles; on 2lNC   CV:  no JVD; no peripheral edema  GI: no rebound, no guarding  MSK: No digital clubbing or cyanosis  SKIN: No rashes or ulcers noted  PSYCH: Appropriate insight/judgment; A+O x 4, mood and affect appropriate, recent/remote memory intact    Consultant(s) Notes Reviewed:  [x ] YES  [ ] NO  Care Discussed with Consultants/Other Providers [ x] YES  [ ] NO    LABS:                        10.7   11.28 )-----------( 185      ( 13 Apr 2025 06:55 )             36.2     04-13    139  |  104  |  40.7[H]  ----------------------------<  89  4.9   |  25.0  |  1.77[H]    Ca    8.1[L]      13 Apr 2025 06:55  Mg     1.9     04-13    TPro  5.9[L]  /  Alb  3.3  /  TBili  0.7  /  DBili  x   /  AST  22  /  ALT  17  /  AlkPhos  46  04-13      Urinalysis Basic - ( 13 Apr 2025 06:55 )    Color: x / Appearance: x / SG: x / pH: x  Gluc: 89 mg/dL / Ketone: x  / Bili: x / Urobili: x   Blood: x / Protein: x / Nitrite: x   Leuk Esterase: x / RBC: x / WBC x   Sq Epi: x / Non Sq Epi: x / Bacteria: x      CAPILLARY BLOOD GLUCOSE            Urinalysis Basic - ( 13 Apr 2025 06:55 )    Color: x / Appearance: x / SG: x / pH: x  Gluc: 89 mg/dL / Ketone: x  / Bili: x / Urobili: x   Blood: x / Protein: x / Nitrite: x   Leuk Esterase: x / RBC: x / WBC x   Sq Epi: x / Non Sq Epi: x / Bacteria: x        RADIOLOGY & ADDITIONAL TESTS:    Imaging Personally Reviewed:  [ ] YES  [ ] NO    HEALTH ISSUES - PROBLEM Dx:  VF (ventricular fibrillation)    CAD (coronary artery disease)  Coronary artery disease is a condition where the arteries the supply the heart muscle get clogges with fatty deposits & puts you at risk for a heart attack  Call your doctor if you have any new pain, pressure, or discomfort in the center of your chest, pain, tingling or discomfort in arms, back, neck, jaw, or stomach, shortness of breath, nausea, vomiting, burping or heartburn, sweating, cold and clammy skin, racing or abnormal heartbeat for more than 10 minutes or if they keep coming & going.  Call 911 and do not tr to get to hospital by care  You can help yourself with lefestyle changes (quitting smoking if you smoke), eat lots of fruits & vegetables & low fat dairy products, not a lot of meat & fatty foods, walk or some form of physical activity most days of the week, lose weight if you are overweight  Take your cardiac medication as prescribed to lower cholesterol, to lower blood pressure, aspirin to prevent blood clots, and diabetes control  Make sure to keep appointments with doctor for cardiac follow up care

## 2025-04-14 ENCOUNTER — APPOINTMENT (OUTPATIENT)
Dept: CARDIOLOGY | Facility: CLINIC | Age: 89
End: 2025-04-14

## 2025-04-14 ENCOUNTER — TRANSCRIPTION ENCOUNTER (OUTPATIENT)
Age: 89
End: 2025-04-14

## 2025-04-14 VITALS
OXYGEN SATURATION: 97 % | HEART RATE: 69 BPM | RESPIRATION RATE: 16 BRPM | SYSTOLIC BLOOD PRESSURE: 158 MMHG | TEMPERATURE: 97 F | DIASTOLIC BLOOD PRESSURE: 68 MMHG

## 2025-04-14 LAB
ANION GAP SERPL CALC-SCNC: 12 MMOL/L — SIGNIFICANT CHANGE UP (ref 5–17)
BASOPHILS # BLD AUTO: 0.06 K/UL — SIGNIFICANT CHANGE UP (ref 0–0.2)
BASOPHILS NFR BLD AUTO: 0.6 % — SIGNIFICANT CHANGE UP (ref 0–2)
BUN SERPL-MCNC: 36.5 MG/DL — HIGH (ref 8–20)
CALCIUM SERPL-MCNC: 8.3 MG/DL — LOW (ref 8.4–10.5)
CHLORIDE SERPL-SCNC: 104 MMOL/L — SIGNIFICANT CHANGE UP (ref 96–108)
CO2 SERPL-SCNC: 24 MMOL/L — SIGNIFICANT CHANGE UP (ref 22–29)
CREAT SERPL-MCNC: 1.56 MG/DL — HIGH (ref 0.5–1.3)
EGFR: 32 ML/MIN/1.73M2 — LOW
EGFR: 32 ML/MIN/1.73M2 — LOW
EOSINOPHIL # BLD AUTO: 0.74 K/UL — HIGH (ref 0–0.5)
EOSINOPHIL NFR BLD AUTO: 7.3 % — HIGH (ref 0–6)
GLUCOSE SERPL-MCNC: 86 MG/DL — SIGNIFICANT CHANGE UP (ref 70–99)
HCT VFR BLD CALC: 33.8 % — LOW (ref 34.5–45)
HGB BLD-MCNC: 10.4 G/DL — LOW (ref 11.5–15.5)
IMM GRANULOCYTES # BLD AUTO: 0.05 K/UL — SIGNIFICANT CHANGE UP (ref 0–0.07)
IMM GRANULOCYTES NFR BLD AUTO: 0.5 % — SIGNIFICANT CHANGE UP (ref 0–0.9)
LYMPHOCYTES # BLD AUTO: 1.66 K/UL — SIGNIFICANT CHANGE UP (ref 1–3.3)
LYMPHOCYTES NFR BLD AUTO: 16.3 % — SIGNIFICANT CHANGE UP (ref 13–44)
MAGNESIUM SERPL-MCNC: 1.9 MG/DL — SIGNIFICANT CHANGE UP (ref 1.8–2.6)
MCHC RBC-ENTMCNC: 30.8 G/DL — LOW (ref 32–36)
MCHC RBC-ENTMCNC: 31.5 PG — SIGNIFICANT CHANGE UP (ref 27–34)
MCV RBC AUTO: 102.4 FL — HIGH (ref 80–100)
MONOCYTES # BLD AUTO: 1.58 K/UL — HIGH (ref 0–0.9)
MONOCYTES NFR BLD AUTO: 15.5 % — HIGH (ref 2–14)
NEUTROPHILS # BLD AUTO: 6.11 K/UL — SIGNIFICANT CHANGE UP (ref 1.8–7.4)
NEUTROPHILS NFR BLD AUTO: 59.8 % — SIGNIFICANT CHANGE UP (ref 43–77)
NRBC # BLD AUTO: 0 K/UL — SIGNIFICANT CHANGE UP (ref 0–0)
NRBC # FLD: 0 K/UL — SIGNIFICANT CHANGE UP (ref 0–0)
NRBC BLD AUTO-RTO: 0 /100 WBCS — SIGNIFICANT CHANGE UP (ref 0–0)
PHOSPHATE SERPL-MCNC: 3 MG/DL — SIGNIFICANT CHANGE UP (ref 2.4–4.7)
PLATELET # BLD AUTO: 165 K/UL — SIGNIFICANT CHANGE UP (ref 150–400)
PMV BLD: 11.1 FL — SIGNIFICANT CHANGE UP (ref 7–13)
POTASSIUM SERPL-MCNC: 4.4 MMOL/L — SIGNIFICANT CHANGE UP (ref 3.5–5.3)
POTASSIUM SERPL-SCNC: 4.4 MMOL/L — SIGNIFICANT CHANGE UP (ref 3.5–5.3)
RBC # BLD: 3.3 M/UL — LOW (ref 3.8–5.2)
RBC # FLD: 12.7 % — SIGNIFICANT CHANGE UP (ref 10.3–14.5)
SODIUM SERPL-SCNC: 140 MMOL/L — SIGNIFICANT CHANGE UP (ref 135–145)
WBC # BLD: 10.2 K/UL — SIGNIFICANT CHANGE UP (ref 3.8–10.5)
WBC # FLD AUTO: 10.2 K/UL — SIGNIFICANT CHANGE UP (ref 3.8–10.5)

## 2025-04-14 PROCEDURE — 93005 ELECTROCARDIOGRAM TRACING: CPT

## 2025-04-14 PROCEDURE — 83880 ASSAY OF NATRIURETIC PEPTIDE: CPT

## 2025-04-14 PROCEDURE — 93306 TTE W/DOPPLER COMPLETE: CPT

## 2025-04-14 PROCEDURE — 83036 HEMOGLOBIN GLYCOSYLATED A1C: CPT

## 2025-04-14 PROCEDURE — 84100 ASSAY OF PHOSPHORUS: CPT

## 2025-04-14 PROCEDURE — C1874: CPT

## 2025-04-14 PROCEDURE — 99232 SBSQ HOSP IP/OBS MODERATE 35: CPT

## 2025-04-14 PROCEDURE — 99239 HOSP IP/OBS DSCHRG MGMT >30: CPT

## 2025-04-14 PROCEDURE — 84484 ASSAY OF TROPONIN QUANT: CPT

## 2025-04-14 PROCEDURE — 87507 IADNA-DNA/RNA PROBE TQ 12-25: CPT

## 2025-04-14 PROCEDURE — 92950 HEART/LUNG RESUSCITATION CPR: CPT

## 2025-04-14 PROCEDURE — 85730 THROMBOPLASTIN TIME PARTIAL: CPT

## 2025-04-14 PROCEDURE — 76775 US EXAM ABDO BACK WALL LIM: CPT

## 2025-04-14 PROCEDURE — C1753: CPT

## 2025-04-14 PROCEDURE — 87641 MR-STAPH DNA AMP PROBE: CPT

## 2025-04-14 PROCEDURE — 81003 URINALYSIS AUTO W/O SCOPE: CPT

## 2025-04-14 PROCEDURE — C1887: CPT

## 2025-04-14 PROCEDURE — 96374 THER/PROPH/DIAG INJ IV PUSH: CPT

## 2025-04-14 PROCEDURE — 85025 COMPLETE CBC W/AUTO DIFF WBC: CPT

## 2025-04-14 PROCEDURE — C9600: CPT | Mod: RI

## 2025-04-14 PROCEDURE — 80061 LIPID PANEL: CPT

## 2025-04-14 PROCEDURE — C1769: CPT

## 2025-04-14 PROCEDURE — 80048 BASIC METABOLIC PNL TOTAL CA: CPT

## 2025-04-14 PROCEDURE — 71045 X-RAY EXAM CHEST 1 VIEW: CPT

## 2025-04-14 PROCEDURE — 93458 L HRT ARTERY/VENTRICLE ANGIO: CPT | Mod: 59

## 2025-04-14 PROCEDURE — 80053 COMPREHEN METABOLIC PANEL: CPT

## 2025-04-14 PROCEDURE — 36415 COLL VENOUS BLD VENIPUNCTURE: CPT

## 2025-04-14 PROCEDURE — 92978 ENDOLUMINL IVUS OCT C 1ST: CPT | Mod: RI

## 2025-04-14 PROCEDURE — 94640 AIRWAY INHALATION TREATMENT: CPT

## 2025-04-14 PROCEDURE — 85027 COMPLETE CBC AUTOMATED: CPT

## 2025-04-14 PROCEDURE — C1894: CPT

## 2025-04-14 PROCEDURE — 85610 PROTHROMBIN TIME: CPT

## 2025-04-14 PROCEDURE — 94760 N-INVAS EAR/PLS OXIMETRY 1: CPT

## 2025-04-14 PROCEDURE — 99285 EMERGENCY DEPT VISIT HI MDM: CPT

## 2025-04-14 PROCEDURE — C1725: CPT

## 2025-04-14 PROCEDURE — 87640 STAPH A DNA AMP PROBE: CPT

## 2025-04-14 PROCEDURE — 83735 ASSAY OF MAGNESIUM: CPT

## 2025-04-14 RX ORDER — ASPIRIN 325 MG
1 TABLET ORAL
Qty: 10 | Refills: 0
Start: 2025-04-14 | End: 2025-04-23

## 2025-04-14 RX ORDER — ASPIRIN 325 MG
1 TABLET ORAL
Qty: 90 | Refills: 0
Start: 2025-04-14 | End: 2025-07-12

## 2025-04-14 RX ORDER — LOSARTAN POTASSIUM 100 MG/1
1 TABLET, FILM COATED ORAL
Refills: 0 | DISCHARGE

## 2025-04-14 RX ORDER — AMIODARONE HYDROCHLORIDE 50 MG/ML
1 INJECTION, SOLUTION INTRAVENOUS
Qty: 30 | Refills: 0
Start: 2025-04-14 | End: 2025-04-23

## 2025-04-14 RX ORDER — APIXABAN 2.5 MG/1
1 TABLET, FILM COATED ORAL
Qty: 180 | Refills: 0
Start: 2025-04-14 | End: 2025-07-12

## 2025-04-14 RX ORDER — ATORVASTATIN CALCIUM 80 MG/1
1 TABLET, FILM COATED ORAL
Qty: 90 | Refills: 0
Start: 2025-04-14 | End: 2025-07-12

## 2025-04-14 RX ORDER — AMIODARONE HYDROCHLORIDE 50 MG/ML
1 INJECTION, SOLUTION INTRAVENOUS
Qty: 90 | Refills: 0
Start: 2025-04-14 | End: 2025-07-12

## 2025-04-14 RX ORDER — CLOPIDOGREL BISULFATE 75 MG/1
1 TABLET, FILM COATED ORAL
Qty: 90 | Refills: 0
Start: 2025-04-14 | End: 2025-07-12

## 2025-04-14 RX ORDER — METOPROLOL SUCCINATE 50 MG/1
1 TABLET, EXTENDED RELEASE ORAL
Qty: 180 | Refills: 0
Start: 2025-04-14 | End: 2025-07-12

## 2025-04-14 RX ADMIN — METOPROLOL SUCCINATE 25 MILLIGRAM(S): 50 TABLET, EXTENDED RELEASE ORAL at 11:07

## 2025-04-14 RX ADMIN — Medication 81 MILLIGRAM(S): at 11:05

## 2025-04-14 RX ADMIN — Medication 40 MILLIGRAM(S): at 11:05

## 2025-04-14 RX ADMIN — Medication 1 APPLICATION(S): at 08:07

## 2025-04-14 RX ADMIN — Medication 10 MILLIGRAM(S): at 08:45

## 2025-04-14 RX ADMIN — AMIODARONE HYDROCHLORIDE 200 MILLIGRAM(S): 50 INJECTION, SOLUTION INTRAVENOUS at 08:40

## 2025-04-14 RX ADMIN — APIXABAN 5 MILLIGRAM(S): 2.5 TABLET, FILM COATED ORAL at 05:56

## 2025-04-14 RX ADMIN — TIOTROPIUM BROMIDE INHALATION SPRAY 2 PUFF(S): 3.12 SPRAY, METERED RESPIRATORY (INHALATION) at 08:40

## 2025-04-14 RX ADMIN — Medication 125 MICROGRAM(S): at 05:57

## 2025-04-14 RX ADMIN — AMIODARONE HYDROCHLORIDE 200 MILLIGRAM(S): 50 INJECTION, SOLUTION INTRAVENOUS at 17:14

## 2025-04-14 RX ADMIN — Medication 1 DOSE(S): at 08:40

## 2025-04-14 RX ADMIN — AMIODARONE HYDROCHLORIDE 200 MILLIGRAM(S): 50 INJECTION, SOLUTION INTRAVENOUS at 00:59

## 2025-04-14 RX ADMIN — CLOPIDOGREL BISULFATE 75 MILLIGRAM(S): 75 TABLET, FILM COATED ORAL at 11:05

## 2025-04-14 NOTE — DISCHARGE NOTE NURSING/CASE MANAGEMENT/SOCIAL WORK - NSTRANSFERBELONGINGSRESP_GEN_A_NUR
Met with patient to schedule surgery with Dr. Rj Jose.   Surgery was scheduled on 03/15 at [facility] due to Transplant.   Patient will have H&P at [facility]  Post-Op care appointment was scheduled on 03/25  Patient is aware a / is needed day of surgery.   Patient received surgery packet has my direct contact information for any further questions.     
yes

## 2025-04-14 NOTE — PROGRESS NOTE ADULT - ASSESSMENT
89 YOF HTN, HLD, COPD presented with chest pain and sustained a V-fib cardiac arrest    Acute kidney injury  V-fib cardiac arrest  Coronary artery disease  A-fib with RVR  Hypertension  Hyperlipidemia  Hypothyroidism  COPD on home O2    Plan   Continue current medications   Daily labs   Monitor urine output   Renally dosed medicaitons   Discussed with the patient and her daughter over the phone

## 2025-04-14 NOTE — DISCHARGE NOTE NURSING/CASE MANAGEMENT/SOCIAL WORK - PATIENT PORTAL LINK FT
You can access the FollowMyHealth Patient Portal offered by [facility] by registering at the following website: [email or web address]. By joining [facility]’s FollowMyHealth portal, you will also be able to view your health information using other applications (apps) compatible with our system.

## 2025-04-14 NOTE — PROGRESS NOTE ADULT - PROVIDER SPECIALTY LIST ADULT
Electrophysiology
Electrophysiology
Internal Medicine
Hospitalist
Intervent Cardiology
Nephrology
Hospitalist
Hospitalist
Nephrology
Cardiology
Cardiology
Hospitalist

## 2025-04-14 NOTE — DISCHARGE NOTE NURSING/CASE MANAGEMENT/SOCIAL WORK - FINANCIAL ASSISTANCE
[facility] provides services at a reduced cost to those who are determined to be eligible through [facility]’s financial assistance program. Information regarding [facility]’s financial assistance program can be found by going to [email or web address] or by calling [phone].

## 2025-04-17 ENCOUNTER — APPOINTMENT (OUTPATIENT)
Dept: ELECTROPHYSIOLOGY | Facility: CLINIC | Age: 89
End: 2025-04-17
Payer: MEDICARE

## 2025-04-17 ENCOUNTER — APPOINTMENT (OUTPATIENT)
Dept: CARDIOLOGY | Facility: CLINIC | Age: 89
End: 2025-04-17
Payer: MEDICARE

## 2025-04-17 ENCOUNTER — APPOINTMENT (OUTPATIENT)
Dept: PULMONOLOGY | Facility: CLINIC | Age: 89
End: 2025-04-17
Payer: MEDICARE

## 2025-04-17 VITALS
HEIGHT: 57 IN | WEIGHT: 140 LBS | DIASTOLIC BLOOD PRESSURE: 72 MMHG | OXYGEN SATURATION: 97 % | SYSTOLIC BLOOD PRESSURE: 122 MMHG | BODY MASS INDEX: 30.2 KG/M2 | HEART RATE: 63 BPM

## 2025-04-17 VITALS
HEIGHT: 57 IN | HEART RATE: 72 BPM | WEIGHT: 135 LBS | SYSTOLIC BLOOD PRESSURE: 120 MMHG | DIASTOLIC BLOOD PRESSURE: 82 MMHG | RESPIRATION RATE: 16 BRPM | BODY MASS INDEX: 29.12 KG/M2 | OXYGEN SATURATION: 94 %

## 2025-04-17 DIAGNOSIS — I48.19 OTHER PERSISTENT ATRIAL FIBRILLATION: ICD-10-CM

## 2025-04-17 DIAGNOSIS — R05.9 COUGH, UNSPECIFIED: ICD-10-CM

## 2025-04-17 DIAGNOSIS — J44.9 CHRONIC OBSTRUCTIVE PULMONARY DISEASE, UNSPECIFIED: ICD-10-CM

## 2025-04-17 DIAGNOSIS — I49.01 VENTRICULAR FIBRILLATION: ICD-10-CM

## 2025-04-17 DIAGNOSIS — I25.10 ATHEROSCLEROTIC HEART DISEASE OF NATIVE CORONARY ARTERY W/OUT ANGINA PECTORIS: ICD-10-CM

## 2025-04-17 DIAGNOSIS — E66.811 OBESITY, CLASS 1: ICD-10-CM

## 2025-04-17 DIAGNOSIS — J96.11 CHRONIC RESPIRATORY FAILURE WITH HYPOXIA: ICD-10-CM

## 2025-04-17 DIAGNOSIS — Z87.891 PERSONAL HISTORY OF NICOTINE DEPENDENCE: ICD-10-CM

## 2025-04-17 DIAGNOSIS — I50.32 CHRONIC DIASTOLIC (CONGESTIVE) HEART FAILURE: ICD-10-CM

## 2025-04-17 DIAGNOSIS — R06.02 SHORTNESS OF BREATH: ICD-10-CM

## 2025-04-17 PROCEDURE — 99496 TRANSJ CARE MGMT HIGH F2F 7D: CPT

## 2025-04-17 PROCEDURE — 93000 ELECTROCARDIOGRAM COMPLETE: CPT

## 2025-04-17 PROCEDURE — 99215 OFFICE O/P EST HI 40 MIN: CPT | Mod: 25

## 2025-04-17 PROCEDURE — 99215 OFFICE O/P EST HI 40 MIN: CPT

## 2025-04-17 PROCEDURE — G2211 COMPLEX E/M VISIT ADD ON: CPT

## 2025-04-17 RX ORDER — ERGOCALCIFEROL 1.25 MG/1
1.25 MG CAPSULE, LIQUID FILLED ORAL
Qty: 12 | Refills: 0 | Status: ACTIVE | COMMUNITY
Start: 2024-12-27

## 2025-04-17 RX ORDER — FUROSEMIDE 20 MG/1
20 TABLET ORAL DAILY
Qty: 30 | Refills: 3 | Status: ACTIVE | COMMUNITY
Start: 2025-04-17 | End: 1900-01-01

## 2025-04-17 RX ORDER — POTASSIUM CHLORIDE 750 MG/1
10 TABLET, FILM COATED, EXTENDED RELEASE ORAL DAILY
Qty: 90 | Refills: 3 | Status: ACTIVE | COMMUNITY
Start: 2025-04-17 | End: 1900-01-01

## 2025-04-17 RX ORDER — APIXABAN 2.5 MG/1
2.5 TABLET, FILM COATED ORAL
Qty: 180 | Refills: 2 | Status: ACTIVE | COMMUNITY
Start: 2025-04-17 | End: 1900-01-01

## 2025-04-17 RX ORDER — LEVOTHYROXINE SODIUM 0.1 MG/1
100 TABLET ORAL
Qty: 90 | Refills: 0 | Status: ACTIVE | COMMUNITY
Start: 2025-02-21

## 2025-04-17 RX ORDER — PREDNISONE 10 MG/1
10 TABLET ORAL DAILY
Qty: 100 | Refills: 0 | Status: ACTIVE | COMMUNITY
Start: 2025-04-17 | End: 1900-01-01

## 2025-04-20 PROBLEM — I25.10 CORONARY ARTERY DISEASE INVOLVING NATIVE CORONARY ARTERY OF NATIVE HEART WITHOUT ANGINA PECTORIS: Status: ACTIVE | Noted: 2025-04-20

## 2025-04-20 PROBLEM — I49.01 VENTRICULAR FIBRILLATION: Status: ACTIVE | Noted: 2025-04-20

## 2025-05-13 ENCOUNTER — RX CHANGE (OUTPATIENT)
Age: 89
End: 2025-05-13

## 2025-05-16 ENCOUNTER — NON-APPOINTMENT (OUTPATIENT)
Age: 89
End: 2025-05-16

## 2025-05-28 ENCOUNTER — INPATIENT (INPATIENT)
Facility: HOSPITAL | Age: 89
LOS: 5 days | Discharge: HOME | DRG: 372 | End: 2025-06-03
Attending: STUDENT IN AN ORGANIZED HEALTH CARE EDUCATION/TRAINING PROGRAM | Admitting: STUDENT IN AN ORGANIZED HEALTH CARE EDUCATION/TRAINING PROGRAM
Payer: MEDICARE

## 2025-05-28 VITALS
OXYGEN SATURATION: 97 % | DIASTOLIC BLOOD PRESSURE: 72 MMHG | HEART RATE: 74 BPM | WEIGHT: 139.99 LBS | RESPIRATION RATE: 19 BRPM | TEMPERATURE: 100 F | SYSTOLIC BLOOD PRESSURE: 151 MMHG

## 2025-05-28 LAB
ALBUMIN SERPL ELPH-MCNC: 3.2 G/DL — LOW (ref 3.3–5.2)
ALP SERPL-CCNC: 74 U/L — SIGNIFICANT CHANGE UP (ref 40–120)
ALT FLD-CCNC: 29 U/L — SIGNIFICANT CHANGE UP
ANION GAP SERPL CALC-SCNC: 15 MMOL/L — SIGNIFICANT CHANGE UP (ref 5–17)
AST SERPL-CCNC: 33 U/L — HIGH
BASOPHILS # BLD AUTO: 0.06 K/UL — SIGNIFICANT CHANGE UP (ref 0–0.2)
BASOPHILS NFR BLD AUTO: 0.3 % — SIGNIFICANT CHANGE UP (ref 0–2)
BILIRUB SERPL-MCNC: 0.6 MG/DL — SIGNIFICANT CHANGE UP (ref 0.4–2)
BUN SERPL-MCNC: 25.4 MG/DL — HIGH (ref 8–20)
CALCIUM SERPL-MCNC: 8.9 MG/DL — SIGNIFICANT CHANGE UP (ref 8.4–10.5)
CHLORIDE SERPL-SCNC: 99 MMOL/L — SIGNIFICANT CHANGE UP (ref 96–108)
CO2 SERPL-SCNC: 24 MMOL/L — SIGNIFICANT CHANGE UP (ref 22–29)
CREAT SERPL-MCNC: 1.35 MG/DL — HIGH (ref 0.5–1.3)
EGFR: 37 ML/MIN/1.73M2 — LOW
EGFR: 37 ML/MIN/1.73M2 — LOW
EOSINOPHIL # BLD AUTO: 0.02 K/UL — SIGNIFICANT CHANGE UP (ref 0–0.5)
EOSINOPHIL NFR BLD AUTO: 0.1 % — SIGNIFICANT CHANGE UP (ref 0–6)
GLUCOSE SERPL-MCNC: 101 MG/DL — HIGH (ref 70–99)
HCT VFR BLD CALC: 33.5 % — LOW (ref 34.5–45)
HGB BLD-MCNC: 10.3 G/DL — LOW (ref 11.5–15.5)
IMM GRANULOCYTES # BLD AUTO: 0.13 K/UL — HIGH (ref 0–0.07)
IMM GRANULOCYTES NFR BLD AUTO: 0.6 % — SIGNIFICANT CHANGE UP (ref 0–0.9)
LIDOCAIN IGE QN: 41 U/L — SIGNIFICANT CHANGE UP (ref 22–51)
LYMPHOCYTES # BLD AUTO: 1.01 K/UL — SIGNIFICANT CHANGE UP (ref 1–3.3)
LYMPHOCYTES NFR BLD AUTO: 4.8 % — LOW (ref 13–44)
MCHC RBC-ENTMCNC: 30.7 G/DL — LOW (ref 32–36)
MCHC RBC-ENTMCNC: 30.7 PG — SIGNIFICANT CHANGE UP (ref 27–34)
MCV RBC AUTO: 99.7 FL — SIGNIFICANT CHANGE UP (ref 80–100)
MONOCYTES # BLD AUTO: 1.79 K/UL — HIGH (ref 0–0.9)
MONOCYTES NFR BLD AUTO: 8.6 % — SIGNIFICANT CHANGE UP (ref 2–14)
NEUTROPHILS # BLD AUTO: 17.87 K/UL — HIGH (ref 1.8–7.4)
NEUTROPHILS NFR BLD AUTO: 85.6 % — HIGH (ref 43–77)
NRBC # BLD AUTO: 0 K/UL — SIGNIFICANT CHANGE UP (ref 0–0)
NRBC # FLD: 0 K/UL — SIGNIFICANT CHANGE UP (ref 0–0)
NRBC BLD AUTO-RTO: 0 /100 WBCS — SIGNIFICANT CHANGE UP (ref 0–0)
PLATELET # BLD AUTO: 263 K/UL — SIGNIFICANT CHANGE UP (ref 150–400)
PMV BLD: 11.5 FL — SIGNIFICANT CHANGE UP (ref 7–13)
POTASSIUM SERPL-MCNC: 4.7 MMOL/L — SIGNIFICANT CHANGE UP (ref 3.5–5.3)
POTASSIUM SERPL-SCNC: 4.7 MMOL/L — SIGNIFICANT CHANGE UP (ref 3.5–5.3)
PROT SERPL-MCNC: 6.5 G/DL — LOW (ref 6.6–8.7)
RBC # BLD: 3.36 M/UL — LOW (ref 3.8–5.2)
RBC # FLD: 13.4 % — SIGNIFICANT CHANGE UP (ref 10.3–14.5)
SODIUM SERPL-SCNC: 138 MMOL/L — SIGNIFICANT CHANGE UP (ref 135–145)
WBC # BLD: 20.88 K/UL — HIGH (ref 3.8–10.5)
WBC # FLD AUTO: 20.88 K/UL — HIGH (ref 3.8–10.5)

## 2025-05-28 PROCEDURE — 99285 EMERGENCY DEPT VISIT HI MDM: CPT

## 2025-05-28 RX ORDER — ACETAMINOPHEN 500 MG/5ML
1000 LIQUID (ML) ORAL ONCE
Refills: 0 | Status: COMPLETED | OUTPATIENT
Start: 2025-05-28 | End: 2025-05-28

## 2025-05-28 RX ADMIN — Medication 500 MILLILITER(S): at 22:45

## 2025-05-28 RX ADMIN — Medication 400 MILLIGRAM(S): at 22:46

## 2025-05-28 NOTE — ED ADULT TRIAGE NOTE - CHIEF COMPLAINT QUOTE
Pt presents to ED c/o R lower abdominal pain that started this morning. Pt states she thought it was gas but never subsided. Pt took Pepto-Bismol with no relief. Denies N/V/diarrhea/constipation. PMH of HTN, complaint with meds.

## 2025-05-28 NOTE — ED ADULT NURSE NOTE - AVIAN FLU SYMPTOMS
At this point she needs to stop the Losartan and I will send Rx Amlodipine 5mg one daily (which pharm?)  I would also have her recheck with the Pulmonologist she saw in the fall last year  No

## 2025-05-28 NOTE — ED ADULT NURSE NOTE - OBJECTIVE STATEMENT
pt presents to ED in NAD c/o RLQ abdominal pain x 1 day. pt denies chest pain, N/V, SOB, fever, chills, lightheadedness, and dizziness. pt breathing even and unlabored at this time.

## 2025-05-29 DIAGNOSIS — Z01.810 ENCOUNTER FOR PREPROCEDURAL CARDIOVASCULAR EXAMINATION: ICD-10-CM

## 2025-05-29 DIAGNOSIS — Z86.79 PERSONAL HISTORY OF OTHER DISEASES OF THE CIRCULATORY SYSTEM: ICD-10-CM

## 2025-05-29 DIAGNOSIS — I25.10 ATHEROSCLEROTIC HEART DISEASE OF NATIVE CORONARY ARTERY WITHOUT ANGINA PECTORIS: ICD-10-CM

## 2025-05-29 LAB
ALBUMIN SERPL ELPH-MCNC: 2.9 G/DL — LOW (ref 3.3–5.2)
ALP SERPL-CCNC: 77 U/L — SIGNIFICANT CHANGE UP (ref 40–120)
ALT FLD-CCNC: 27 U/L — SIGNIFICANT CHANGE UP
ANION GAP SERPL CALC-SCNC: 13 MMOL/L — SIGNIFICANT CHANGE UP (ref 5–17)
APPEARANCE UR: CLEAR — SIGNIFICANT CHANGE UP
APTT BLD: 175.3 SEC — CRITICAL HIGH (ref 26.1–36.8)
APTT BLD: 86.4 SEC — HIGH (ref 26.1–36.8)
AST SERPL-CCNC: 27 U/L — SIGNIFICANT CHANGE UP
BASOPHILS # BLD AUTO: 0.05 K/UL — SIGNIFICANT CHANGE UP (ref 0–0.2)
BASOPHILS NFR BLD AUTO: 0.2 % — SIGNIFICANT CHANGE UP (ref 0–2)
BILIRUB SERPL-MCNC: 0.7 MG/DL — SIGNIFICANT CHANGE UP (ref 0.4–2)
BILIRUB UR-MCNC: NEGATIVE — SIGNIFICANT CHANGE UP
BUN SERPL-MCNC: 21.8 MG/DL — HIGH (ref 8–20)
CALCIUM SERPL-MCNC: 8.5 MG/DL — SIGNIFICANT CHANGE UP (ref 8.4–10.5)
CHLORIDE SERPL-SCNC: 100 MMOL/L — SIGNIFICANT CHANGE UP (ref 96–108)
CO2 SERPL-SCNC: 24 MMOL/L — SIGNIFICANT CHANGE UP (ref 22–29)
COLOR SPEC: YELLOW — SIGNIFICANT CHANGE UP
CREAT SERPL-MCNC: 1.35 MG/DL — HIGH (ref 0.5–1.3)
DIFF PNL FLD: NEGATIVE — SIGNIFICANT CHANGE UP
EGFR: 37 ML/MIN/1.73M2 — LOW
EGFR: 37 ML/MIN/1.73M2 — LOW
EOSINOPHIL # BLD AUTO: 0.3 K/UL — SIGNIFICANT CHANGE UP (ref 0–0.5)
EOSINOPHIL NFR BLD AUTO: 1.3 % — SIGNIFICANT CHANGE UP (ref 0–6)
GLUCOSE BLDC GLUCOMTR-MCNC: 103 MG/DL — HIGH (ref 70–99)
GLUCOSE BLDC GLUCOMTR-MCNC: 103 MG/DL — HIGH (ref 70–99)
GLUCOSE BLDC GLUCOMTR-MCNC: 104 MG/DL — HIGH (ref 70–99)
GLUCOSE SERPL-MCNC: 91 MG/DL — SIGNIFICANT CHANGE UP (ref 70–99)
GLUCOSE UR QL: NEGATIVE MG/DL — SIGNIFICANT CHANGE UP
HCT VFR BLD CALC: 32 % — LOW (ref 34.5–45)
HGB BLD-MCNC: 9.8 G/DL — LOW (ref 11.5–15.5)
IMM GRANULOCYTES # BLD AUTO: 0.17 K/UL — HIGH (ref 0–0.07)
IMM GRANULOCYTES NFR BLD AUTO: 0.7 % — SIGNIFICANT CHANGE UP (ref 0–0.9)
INR BLD: 1.57 RATIO — HIGH (ref 0.85–1.16)
KETONES UR QL: NEGATIVE MG/DL — SIGNIFICANT CHANGE UP
LACTATE BLDV-MCNC: 1.5 MMOL/L — SIGNIFICANT CHANGE UP (ref 0.5–2)
LEUKOCYTE ESTERASE UR-ACNC: NEGATIVE — SIGNIFICANT CHANGE UP
LYMPHOCYTES # BLD AUTO: 1.21 K/UL — SIGNIFICANT CHANGE UP (ref 1–3.3)
LYMPHOCYTES NFR BLD AUTO: 5.3 % — LOW (ref 13–44)
MCHC RBC-ENTMCNC: 30.4 PG — SIGNIFICANT CHANGE UP (ref 27–34)
MCHC RBC-ENTMCNC: 30.6 G/DL — LOW (ref 32–36)
MCV RBC AUTO: 99.4 FL — SIGNIFICANT CHANGE UP (ref 80–100)
MONOCYTES # BLD AUTO: 2.22 K/UL — HIGH (ref 0–0.9)
MONOCYTES NFR BLD AUTO: 9.8 % — SIGNIFICANT CHANGE UP (ref 2–14)
NEUTROPHILS # BLD AUTO: 18.77 K/UL — HIGH (ref 1.8–7.4)
NEUTROPHILS NFR BLD AUTO: 82.7 % — HIGH (ref 43–77)
NITRITE UR-MCNC: NEGATIVE — SIGNIFICANT CHANGE UP
NRBC # BLD AUTO: 0 K/UL — SIGNIFICANT CHANGE UP (ref 0–0)
NRBC # FLD: 0 K/UL — SIGNIFICANT CHANGE UP (ref 0–0)
NRBC BLD AUTO-RTO: 0 /100 WBCS — SIGNIFICANT CHANGE UP (ref 0–0)
PH UR: 6 — SIGNIFICANT CHANGE UP (ref 5–8)
PLATELET # BLD AUTO: 263 K/UL — SIGNIFICANT CHANGE UP (ref 150–400)
PMV BLD: 11.5 FL — SIGNIFICANT CHANGE UP (ref 7–13)
POTASSIUM SERPL-MCNC: 4.4 MMOL/L — SIGNIFICANT CHANGE UP (ref 3.5–5.3)
POTASSIUM SERPL-SCNC: 4.4 MMOL/L — SIGNIFICANT CHANGE UP (ref 3.5–5.3)
PROT SERPL-MCNC: 6.1 G/DL — LOW (ref 6.6–8.7)
PROT UR-MCNC: SIGNIFICANT CHANGE UP MG/DL
PROTHROM AB SERPL-ACNC: 18.1 SEC — HIGH (ref 9.9–13.4)
RBC # BLD: 3.22 M/UL — LOW (ref 3.8–5.2)
RBC # FLD: 13.4 % — SIGNIFICANT CHANGE UP (ref 10.3–14.5)
SODIUM SERPL-SCNC: 137 MMOL/L — SIGNIFICANT CHANGE UP (ref 135–145)
SP GR SPEC: >1.03 — HIGH (ref 1–1.03)
UROBILINOGEN FLD QL: 1 MG/DL — SIGNIFICANT CHANGE UP (ref 0.2–1)
WBC # BLD: 22.72 K/UL — HIGH (ref 3.8–10.5)
WBC # FLD AUTO: 22.72 K/UL — HIGH (ref 3.8–10.5)

## 2025-05-29 PROCEDURE — 99284 EMERGENCY DEPT VISIT MOD MDM: CPT

## 2025-05-29 PROCEDURE — 99221 1ST HOSP IP/OBS SF/LOW 40: CPT

## 2025-05-29 PROCEDURE — 93010 ELECTROCARDIOGRAM REPORT: CPT

## 2025-05-29 PROCEDURE — 99223 1ST HOSP IP/OBS HIGH 75: CPT

## 2025-05-29 PROCEDURE — 74177 CT ABD & PELVIS W/CONTRAST: CPT | Mod: 26

## 2025-05-29 RX ORDER — HEPARIN SODIUM 1000 [USP'U]/ML
5000 INJECTION INTRAVENOUS; SUBCUTANEOUS EVERY 6 HOURS
Refills: 0 | Status: DISCONTINUED | OUTPATIENT
Start: 2025-05-29 | End: 2025-05-30

## 2025-05-29 RX ORDER — HEPARIN SODIUM 1000 [USP'U]/ML
5000 INJECTION INTRAVENOUS; SUBCUTANEOUS ONCE
Refills: 0 | Status: DISCONTINUED | OUTPATIENT
Start: 2025-05-29 | End: 2025-05-29

## 2025-05-29 RX ORDER — AMIODARONE HYDROCHLORIDE 50 MG/ML
200 INJECTION, SOLUTION INTRAVENOUS DAILY
Refills: 0 | Status: DISCONTINUED | OUTPATIENT
Start: 2025-05-29 | End: 2025-06-03

## 2025-05-29 RX ORDER — PIPERACILLIN-TAZO-DEXTROSE,ISO 3.375G/5
3.38 IV SOLUTION, PIGGYBACK PREMIX FROZEN(ML) INTRAVENOUS ONCE
Refills: 0 | Status: COMPLETED | OUTPATIENT
Start: 2025-05-29 | End: 2025-05-29

## 2025-05-29 RX ORDER — ATORVASTATIN CALCIUM 80 MG/1
80 TABLET, FILM COATED ORAL AT BEDTIME
Refills: 0 | Status: DISCONTINUED | OUTPATIENT
Start: 2025-05-29 | End: 2025-06-03

## 2025-05-29 RX ORDER — PIPERACILLIN-TAZO-DEXTROSE,ISO 3.375G/5
3.38 IV SOLUTION, PIGGYBACK PREMIX FROZEN(ML) INTRAVENOUS ONCE
Refills: 0 | Status: DISCONTINUED | OUTPATIENT
Start: 2025-05-29 | End: 2025-05-29

## 2025-05-29 RX ORDER — CLOPIDOGREL BISULFATE 75 MG/1
75 TABLET, FILM COATED ORAL EVERY 24 HOURS
Refills: 0 | Status: DISCONTINUED | OUTPATIENT
Start: 2025-05-29 | End: 2025-05-29

## 2025-05-29 RX ORDER — TIOTROPIUM BROMIDE INHALATION SPRAY 3.12 UG/1
2 SPRAY, METERED RESPIRATORY (INHALATION) DAILY
Refills: 0 | Status: DISCONTINUED | OUTPATIENT
Start: 2025-05-29 | End: 2025-06-01

## 2025-05-29 RX ORDER — HEPARIN SODIUM 1000 [USP'U]/ML
5000 INJECTION INTRAVENOUS; SUBCUTANEOUS EVERY 6 HOURS
Refills: 0 | Status: DISCONTINUED | OUTPATIENT
Start: 2025-05-29 | End: 2025-05-29

## 2025-05-29 RX ORDER — HEPARIN SODIUM 1000 [USP'U]/ML
5000 INJECTION INTRAVENOUS; SUBCUTANEOUS ONCE
Refills: 0 | Status: COMPLETED | OUTPATIENT
Start: 2025-05-29 | End: 2025-05-29

## 2025-05-29 RX ORDER — CLOPIDOGREL BISULFATE 75 MG/1
75 TABLET, FILM COATED ORAL DAILY
Refills: 0 | Status: DISCONTINUED | OUTPATIENT
Start: 2025-05-30 | End: 2025-06-03

## 2025-05-29 RX ORDER — AMLODIPINE BESYLATE 10 MG/1
5 TABLET ORAL DAILY
Refills: 0 | Status: DISCONTINUED | OUTPATIENT
Start: 2025-05-29 | End: 2025-06-02

## 2025-05-29 RX ORDER — HEPARIN SODIUM 1000 [USP'U]/ML
INJECTION INTRAVENOUS; SUBCUTANEOUS
Qty: 25000 | Refills: 0 | Status: DISCONTINUED | OUTPATIENT
Start: 2025-05-29 | End: 2025-05-30

## 2025-05-29 RX ORDER — ASPIRIN 325 MG
81 TABLET ORAL DAILY
Refills: 0 | Status: DISCONTINUED | OUTPATIENT
Start: 2025-05-29 | End: 2025-05-29

## 2025-05-29 RX ORDER — PIPERACILLIN-TAZO-DEXTROSE,ISO 3.375G/5
3.38 IV SOLUTION, PIGGYBACK PREMIX FROZEN(ML) INTRAVENOUS EVERY 8 HOURS
Refills: 0 | Status: DISCONTINUED | OUTPATIENT
Start: 2025-05-29 | End: 2025-06-02

## 2025-05-29 RX ORDER — METOPROLOL SUCCINATE 50 MG/1
25 TABLET, EXTENDED RELEASE ORAL
Refills: 0 | Status: DISCONTINUED | OUTPATIENT
Start: 2025-05-29 | End: 2025-06-02

## 2025-05-29 RX ORDER — SODIUM CHLORIDE 9 G/1000ML
1000 INJECTION, SOLUTION INTRAVENOUS
Refills: 0 | Status: DISCONTINUED | OUTPATIENT
Start: 2025-05-29 | End: 2025-06-02

## 2025-05-29 RX ORDER — HEPARIN SODIUM 1000 [USP'U]/ML
INJECTION INTRAVENOUS; SUBCUTANEOUS
Qty: 25000 | Refills: 0 | Status: DISCONTINUED | OUTPATIENT
Start: 2025-05-29 | End: 2025-05-29

## 2025-05-29 RX ORDER — HEPARIN SODIUM 1000 [USP'U]/ML
2500 INJECTION INTRAVENOUS; SUBCUTANEOUS EVERY 6 HOURS
Refills: 0 | Status: DISCONTINUED | OUTPATIENT
Start: 2025-05-29 | End: 2025-05-30

## 2025-05-29 RX ORDER — ACETAMINOPHEN 500 MG/5ML
975 LIQUID (ML) ORAL EVERY 6 HOURS
Refills: 0 | Status: DISCONTINUED | OUTPATIENT
Start: 2025-05-29 | End: 2025-06-03

## 2025-05-29 RX ORDER — CANGRELOR 50 MG/1
0.75 INJECTION, POWDER, LYOPHILIZED, FOR SOLUTION INTRAVENOUS
Qty: 50 | Refills: 0 | Status: DISCONTINUED | OUTPATIENT
Start: 2025-05-29 | End: 2025-05-29

## 2025-05-29 RX ORDER — HEPARIN SODIUM 1000 [USP'U]/ML
2500 INJECTION INTRAVENOUS; SUBCUTANEOUS EVERY 6 HOURS
Refills: 0 | Status: DISCONTINUED | OUTPATIENT
Start: 2025-05-29 | End: 2025-05-29

## 2025-05-29 RX ORDER — LEVOTHYROXINE SODIUM 300 MCG
125 TABLET ORAL DAILY
Refills: 0 | Status: DISCONTINUED | OUTPATIENT
Start: 2025-05-29 | End: 2025-06-03

## 2025-05-29 RX ADMIN — HEPARIN SODIUM 900 UNIT(S)/HR: 1000 INJECTION INTRAVENOUS; SUBCUTANEOUS at 20:59

## 2025-05-29 RX ADMIN — Medication 200 GRAM(S): at 02:08

## 2025-05-29 RX ADMIN — TIOTROPIUM BROMIDE INHALATION SPRAY 2 PUFF(S): 3.12 SPRAY, METERED RESPIRATORY (INHALATION) at 22:02

## 2025-05-29 RX ADMIN — METOPROLOL SUCCINATE 25 MILLIGRAM(S): 50 TABLET, EXTENDED RELEASE ORAL at 06:14

## 2025-05-29 RX ADMIN — Medication 975 MILLIGRAM(S): at 17:58

## 2025-05-29 RX ADMIN — CLOPIDOGREL BISULFATE 75 MILLIGRAM(S): 75 TABLET, FILM COATED ORAL at 06:14

## 2025-05-29 RX ADMIN — Medication 25 GRAM(S): at 22:02

## 2025-05-29 RX ADMIN — METOPROLOL SUCCINATE 25 MILLIGRAM(S): 50 TABLET, EXTENDED RELEASE ORAL at 17:59

## 2025-05-29 RX ADMIN — Medication 975 MILLIGRAM(S): at 09:06

## 2025-05-29 RX ADMIN — Medication 975 MILLIGRAM(S): at 15:54

## 2025-05-29 RX ADMIN — ATORVASTATIN CALCIUM 80 MILLIGRAM(S): 80 TABLET, FILM COATED ORAL at 03:04

## 2025-05-29 RX ADMIN — HEPARIN SODIUM 5000 UNIT(S): 1000 INJECTION INTRAVENOUS; SUBCUTANEOUS at 04:04

## 2025-05-29 RX ADMIN — Medication 975 MILLIGRAM(S): at 08:03

## 2025-05-29 RX ADMIN — CANGRELOR 14.1 MICROGRAM(S)/KG/MIN: 50 INJECTION, POWDER, LYOPHILIZED, FOR SOLUTION INTRAVENOUS at 15:32

## 2025-05-29 RX ADMIN — AMIODARONE HYDROCHLORIDE 200 MILLIGRAM(S): 50 INJECTION, SOLUTION INTRAVENOUS at 06:15

## 2025-05-29 RX ADMIN — Medication 125 MICROGRAM(S): at 08:04

## 2025-05-29 RX ADMIN — HEPARIN SODIUM 1100 UNIT(S)/HR: 1000 INJECTION INTRAVENOUS; SUBCUTANEOUS at 04:03

## 2025-05-29 RX ADMIN — HEPARIN SODIUM 1100 UNIT(S)/HR: 1000 INJECTION INTRAVENOUS; SUBCUTANEOUS at 10:04

## 2025-05-29 RX ADMIN — Medication 975 MILLIGRAM(S): at 13:22

## 2025-05-29 RX ADMIN — Medication 25 GRAM(S): at 15:37

## 2025-05-29 RX ADMIN — HEPARIN SODIUM 0 UNIT(S)/HR: 1000 INJECTION INTRAVENOUS; SUBCUTANEOUS at 19:46

## 2025-05-29 RX ADMIN — ATORVASTATIN CALCIUM 80 MILLIGRAM(S): 80 TABLET, FILM COATED ORAL at 22:02

## 2025-05-29 RX ADMIN — AMLODIPINE BESYLATE 5 MILLIGRAM(S): 10 TABLET ORAL at 06:14

## 2025-05-29 RX ADMIN — SODIUM CHLORIDE 65 MILLILITER(S): 9 INJECTION, SOLUTION INTRAVENOUS at 08:02

## 2025-05-29 RX ADMIN — Medication 25 GRAM(S): at 08:02

## 2025-05-29 NOTE — H&P ADULT - HISTORY OF PRESENT ILLNESS
HPI: 89y Female PMHX htn, hypothyroidism, CAD. Pt had stents place d1.5 mos ago ( on plavix/eliquis ? per pt )  and is wearing a life vest. presented of 1 day of abd pain associated with nausea ,   Patient denies fevers/chills, denies lightheadedness/dizziness, denies SOB/chest pain, denies nausea/vomiting, denies constipation/diarrhea.      ROS: 10-system review is otherwise negative except HPI above.      PAST MEDICAL & SURGICAL HISTORY:  History of COPD      Hypothyroidism      HTN (hypertension)      HLD (hyperlipidemia)        FAMILY HISTORY:  FH: HTN (hypertension) (Father)      Family history not pertinent as reviewed with the patient.    SOCIAL HISTORY:  Denies any toxic habits    ALLERGIES: NKA Allergy Status Unknown      HOME MEDICATIONS:   Home Medications:  Advair Diskus 250 mcg-50 mcg/inh inhalation powder: 1 inhaled 2 times a day (08 Apr 2025 18:45)  amLODIPine 5 mg oral tablet: 1 tab(s) orally once a day (08 Apr 2025 18:45)  levothyroxine 125 mcg (0.125 mg) oral tablet: 1 tab(s) orally once a day (08 Apr 2025 18:45)  Spiriva 18 mcg inhalation capsule: 1 cap(s) inhaled once a day (08 Apr 2025 18:44)      --------------------------------------------------------------------------------------------    PHYSICAL EXAM:   General: NAD, Lying in bed comfortably  Neuro: A+Ox3  HEENT: EOMI, PERRLA, MMM  Cardio: RRR  Resp: Non labored breathing on RA  GI/Abd: Soft, RLQ T/ND, no rebound/guarding, no masses palpated    --------------------------------------------------------------------------------------------    LABS                 10.3   20.88  )----------(  263       ( 28 May 2025 22:45 )               33.5      138    |  99     |  25.4   ----------------------------<  101        ( 28 May 2025 22:45 )  4.7     |  24.0   |  1.35     Ca    8.9        ( 28 May 2025 22:45 )    TPro  6.5    /  Alb  3.2    /  TBili  0.6    /  DBili  x      /  AST  33     /  ALT  29     /  AlkPhos  74     ( 28 May 2025 22:45 )    LIVER FUNCTIONS - ( 28 May 2025 22:45 )  Alb: 3.2 g/dL / Pro: 6.5 g/dL / ALK PHOS: 74 U/L / ALT: 29 U/L / AST: 33 U/L / GGT: x               CAPILLARY BLOOD GLUCOSE        Urinalysis Basic - ( 28 May 2025 22:45 )    Color: x / Appearance: x / SG: x / pH: x  Gluc: 101 mg/dL / Ketone: x  / Bili: x / Urobili: x   Blood: x / Protein: x / Nitrite: x   Leuk Esterase: x / RBC: x / WBC x   Sq Epi: x / Non Sq Epi: x / Bacteria: x        01:52 - VBG - pH:       | pCO2:       | pO2:       | Lactate: 1.50     --------------------------------------------------------------------------------------------           HPI: 89y Female PMHX htn, hypothyroidism, CAD. afib , Pt had stents place d1.5 mos ago  and is wearing a life vest. presented of 1 day of abd pain associated with nausea ,   Patient denies fevers/chills, denies lightheadedness/dizziness, denies SOB/chest pain, denies nausea/vomiting, denies constipation/diarrhea.      ROS: 10-system review is otherwise negative except HPI above.      PAST MEDICAL & SURGICAL HISTORY:  History of COPD      Hypothyroidism      HTN (hypertension)      HLD (hyperlipidemia)        FAMILY HISTORY:  FH: HTN (hypertension) (Father)      Family history not pertinent as reviewed with the patient.    SOCIAL HISTORY:  Denies any toxic habits    ALLERGIES: NKA Allergy Status Unknown      HOME MEDICATIONS:   Home Medications:  Advair Diskus 250 mcg-50 mcg/inh inhalation powder: 1 inhaled 2 times a day (08 Apr 2025 18:45)  amLODIPine 5 mg oral tablet: 1 tab(s) orally once a day (08 Apr 2025 18:45)  levothyroxine 125 mcg (0.125 mg) oral tablet: 1 tab(s) orally once a day (08 Apr 2025 18:45)  Spiriva 18 mcg inhalation capsule: 1 cap(s) inhaled once a day (08 Apr 2025 18:44)      --------------------------------------------------------------------------------------------    PHYSICAL EXAM:   General: NAD, Lying in bed comfortably  Neuro: A+Ox3  HEENT: EOMI, PERRLA, MMM  Cardio: RRR  Resp: Non labored breathing on RA  GI/Abd: Soft, RLQ T/ND, no rebound/guarding, no masses palpated    --------------------------------------------------------------------------------------------    LABS                 10.3   20.88  )----------(  263       ( 28 May 2025 22:45 )               33.5      138    |  99     |  25.4   ----------------------------<  101        ( 28 May 2025 22:45 )  4.7     |  24.0   |  1.35     Ca    8.9        ( 28 May 2025 22:45 )    TPro  6.5    /  Alb  3.2    /  TBili  0.6    /  DBili  x      /  AST  33     /  ALT  29     /  AlkPhos  74     ( 28 May 2025 22:45 )    LIVER FUNCTIONS - ( 28 May 2025 22:45 )  Alb: 3.2 g/dL / Pro: 6.5 g/dL / ALK PHOS: 74 U/L / ALT: 29 U/L / AST: 33 U/L / GGT: x               CAPILLARY BLOOD GLUCOSE        Urinalysis Basic - ( 28 May 2025 22:45 )    Color: x / Appearance: x / SG: x / pH: x  Gluc: 101 mg/dL / Ketone: x  / Bili: x / Urobili: x   Blood: x / Protein: x / Nitrite: x   Leuk Esterase: x / RBC: x / WBC x   Sq Epi: x / Non Sq Epi: x / Bacteria: x        01:52 - VBG - pH:       | pCO2:       | pO2:       | Lactate: 1.50     --------------------------------------------------------------------------------------------           HPI: 89y Female PMHX htn, hypothyroidism, CAD. afib , Pt had stents place d1.5 mos ago  ( on eliquis and plavix last taken yest) and is wearing a life vest. presented of 1 day of abd pain associated with nausea ,   Patient denies fevers/chills, denies lightheadedness/dizziness, denies SOB/chest pain, denies nausea/vomiting, denies constipation/diarrhea.      last colonoscopy was more than 15 years ago     ROS: 10-system review is otherwise negative except HPI above.      PAST MEDICAL & SURGICAL HISTORY:  History of COPD      Hypothyroidism      HTN (hypertension)      HLD (hyperlipidemia)        FAMILY HISTORY:  FH: HTN (hypertension) (Father)      Family history not pertinent as reviewed with the patient.    SOCIAL HISTORY:  Denies any toxic habits    ALLERGIES: NKA Allergy Status Unknown      HOME MEDICATIONS:   Home Medications:  Advair Diskus 250 mcg-50 mcg/inh inhalation powder: 1 inhaled 2 times a day (08 Apr 2025 18:45)  amLODIPine 5 mg oral tablet: 1 tab(s) orally once a day (08 Apr 2025 18:45)  levothyroxine 125 mcg (0.125 mg) oral tablet: 1 tab(s) orally once a day (08 Apr 2025 18:45)  Spiriva 18 mcg inhalation capsule: 1 cap(s) inhaled once a day (08 Apr 2025 18:44)      --------------------------------------------------------------------------------------------    PHYSICAL EXAM:   General: NAD, Lying in bed comfortably  Neuro: A+Ox3  HEENT: EOMI, PERRLA, MMM  Cardio: RRR  Resp: Non labored breathing on RA  GI/Abd: Soft, RLQ T/ND, no rebound/guarding, no masses palpated    --------------------------------------------------------------------------------------------    LABS                 10.3   20.88  )----------(  263       ( 28 May 2025 22:45 )               33.5      138    |  99     |  25.4   ----------------------------<  101        ( 28 May 2025 22:45 )  4.7     |  24.0   |  1.35     Ca    8.9        ( 28 May 2025 22:45 )    TPro  6.5    /  Alb  3.2    /  TBili  0.6    /  DBili  x      /  AST  33     /  ALT  29     /  AlkPhos  74     ( 28 May 2025 22:45 )    LIVER FUNCTIONS - ( 28 May 2025 22:45 )  Alb: 3.2 g/dL / Pro: 6.5 g/dL / ALK PHOS: 74 U/L / ALT: 29 U/L / AST: 33 U/L / GGT: x               CAPILLARY BLOOD GLUCOSE        Urinalysis Basic - ( 28 May 2025 22:45 )    Color: x / Appearance: x / SG: x / pH: x  Gluc: 101 mg/dL / Ketone: x  / Bili: x / Urobili: x   Blood: x / Protein: x / Nitrite: x   Leuk Esterase: x / RBC: x / WBC x   Sq Epi: x / Non Sq Epi: x / Bacteria: x        01:52 - VBG - pH:       | pCO2:       | pO2:       | Lactate: 1.50     --------------------------------------------------------------------------------------------

## 2025-05-29 NOTE — CONSULT NOTE ADULT - ASSESSMENT
89y Female PMHX  Copd. htn, hypothyroidism,  4/8/25->  CAD. s/p torsades, and thrombotic lesion of the proximal OM1 ( life vest on no firings),  afib presents to the ER with right lower abdominal pain and nausea  and found to have a acute appendicitis and  perforation.  Denies any chest pain sob or palpitations,    
Patient is a 89 year-old female admitted for perf appendicitis. IR consulted for possible drainage of RLQ collection. Imaging reviewed by Dr. Chang, multiple loculated collection not amenable for drainage. Recommend conservative management with antibiotics.     Please call extension [phone] with any questions, concerns or issues regarding above.

## 2025-05-29 NOTE — CONSULT NOTE ADULT - PROBLEM SELECTOR RECOMMENDATION 9
if surgery needed it will be emergent  Patient high risk for high risk procedure  no further testing necessary prior to surgery if surgery needed it will be emergent  Patient high risk for high risk procedure  no further testing necessary prior to surgery    Hold Eliquis (for Afib) at this time due to perforation pending surgery plans.  Bridging with IV  heparin was discontinued by Surgery d/t perforation.    If surgery is planned: if pt can continue with at least with one antiplatelet (SAPT) it would help in preventing Stent thrombosis since the PCI-stent was very recent.  If surgery is not planned: consider to cont Plavix if OK bu Surgery.  If surgery deems no antiplatelets can be continued d/t perforation than Hold antiplatelets with understanding that pt represents high risk of in-stent thrombosis.  if antiplatelets held early reintroduction of antithrombotics post-op is critical.  Resume Eliquis post-op as soon as deemed safe as per surgery  Recc Avoid neuroaxial blocks.  Telemetry monitoring is recc.  ECG, troponin, PBNP post op is recommended. discussed with patient. if surgery needed it will be emergent  Patient high risk for high risk procedure  no further testing necessary prior to surgery    d/w surgery as in below:   No plan for emergent surgery at this time.   pt may have IR procedure and later may need surgery.  Given recent PCI-stent: would recc cangrelor while holding clopidogrel.  Given Afib: would recc IV-Heparin bridging while Eliquis is held.  Recc Avoid neuroaxial blocks.  Telemetry monitoring is recc.  ECG, troponin, PBNP post op is recommended.   discussed with patient.

## 2025-05-29 NOTE — H&P ADULT - ASSESSMENT
ASSESSMENT: Patient is a 89y old female with perforated appendicitis     PLAN:    - Admit to [facility] floor- Tele   - IV Zosyn   - NPO/IVF  - pain control  - may consider IR   - DVT ppx  - OOB/ambulate  - strict I/Os  - Plan discussed with Attending, Dr. Atkins

## 2025-05-29 NOTE — ED PROVIDER NOTE - PHYSICAL EXAMINATION
Constitutional - well-developed.   Head - NCAT. Airway patent.   Eyes - PERRL.   CV - RRR. no murmur. no edema.   Pulm - CTAB.   Abd - soft, moderate lower abd ttp. no rebound. no guarding.   Neuro - A&Ox3. strength 5/5 x4. sensation intact x4. normal gait.   Skin - No rash. .  MSK - normal ROM.

## 2025-05-29 NOTE — PATIENT PROFILE ADULT - FALL HARM RISK - HARM RISK INTERVENTIONS

## 2025-05-29 NOTE — ED PROVIDER NOTE - CLINICAL SUMMARY MEDICAL DECISION MAKING FREE TEXT BOX
pt with abd pain.  labs reviewed. Pt with leukocytosis. Pt signed out to dr. norman pending cT and UA.

## 2025-05-29 NOTE — ED PROVIDER NOTE - ADMIT DISPOSITION PRESENT ON ADMISSION SEPSIS
From: Onur Grewal  To: Soco Monroy  Sent: 7/12/2024 2:35 PM CDT  Subject: MRI of my brain    I had the MRI today. Please review it and let me know if I should make an appointment with you/and or Dr. Graham to discuss.  Onur grewal  
WILLIE Key.   
Reviewed MRI brain with Dr. Curry. No significant findings to contribute to the patient's balance issues.  Meningioma noted, minimal growth since 2008.  Dr. Curry recommends no further imaging to continue to monitor.  Per last office visit note:    PLAN:  Dr. Curry Dr. Curry evaluated pt.  ISoco, am acting as scribe        Neurology referral placed.  Patient was appreciative.  Patient may follow-up as needed.  
No

## 2025-05-29 NOTE — CONSULT NOTE ADULT - SUBJECTIVE AND OBJECTIVE BOX
HPI:   89y Female PMHX htn, hypothyroidism, CAD. afib , Pt had stents place d1.5 mos ago  ( on eliquis and plavix last taken yest) and is wearing a life vest. presented of 1 day of abd pain associated with nausea ,     CT noted for perforated appendicitis with phlegmonous changes.     IR consulted for possible drainage.     ============================================================================  PAST MEDICAL & SURGICAL HISTORY:  History of COPD      Hypothyroidism      HTN (hypertension)      HLD (hyperlipidemia)          FAMILY HISTORY:  FH: HTN (hypertension) (Father)        Social History:      ============================================================================  Vitals:  Vital Signs Last 24 Hrs  T(C): 36.1 (29 May 2025 11:52), Max: 37.6 (28 May 2025 21:37)  T(F): 97 (29 May 2025 11:52), Max: 99.6 (28 May 2025 21:37)  HR: 67 (29 May 2025 11:52) (65 - 76)  BP: 149/63 (29 May 2025 11:52) (131/79 - 170/57)  BP(mean): 82 (29 May 2025 04:55) (82 - 97)  RR: 18 (29 May 2025 11:52) (18 - 25)  SpO2: 95% (29 May 2025 11:52) (94% - 98%)    Parameters below as of 29 May 2025 11:52  Patient On (Oxygen Delivery Method): room air        Physical Exam:       Review of Systems:     ROS Otherwise Negative except for ___.    Labs:                        9.8    22.72 )-----------( 263      ( 29 May 2025 09:20 )             32.0     05-29    137  |  100  |  21.8[H]  ----------------------------<  91  4.4   |  24.0  |  1.35[H]    Ca    8.5      29 May 2025 09:20    TPro  6.1[L]  /  Alb  2.9[L]  /  TBili  0.7  /  DBili  x   /  AST  27  /  ALT  27  /  AlkPhos  77  05-29    PT/INR - ( 29 May 2025 09:20 )   PT: 18.1 sec;   INR: 1.57 ratio         PTT - ( 29 May 2025 09:20 )  PTT:86.4 sec    Imaging:   Pertinent Imaging Reviewed.    ============================================================================

## 2025-05-29 NOTE — ED PROVIDER NOTE - OBJECTIVE STATEMENT
Pt is a 89 yo F co RLQ abd pain.  PMHx significant for htn, af, hypothyroidism, CAD. Pt had stents place d1.5 mos ago here by cardiologist and is wearing a life vest.  PT states that today she had onset of waxing and waning RLQ abd pain.  no n/v. no fever/chills. Pt states that the pain is severe at its worse but will become mild.  Pt states that she has never had this pain before.  no other complaints.

## 2025-05-29 NOTE — H&P ADULT - NS ATTEND AMEND GEN_ALL_CORE FT
Patient seen and examined in the ED.  This is a 89-year-old female with PMHx COPD and CAD on Plavix and Eliquis who presents with abdominal pain.  Pain has been present in the RLQ for only 1 day.  She cannot recall her last colonoscopy.  On exam, she is TTP in the RLQ to deep palpation.  CT A/P Shows acute perforated appendicitis. She will require admission, NPO, IVF, ABx, and IR consultation.  Eliquis and Plavix to be held.

## 2025-05-29 NOTE — CONSULT NOTE ADULT - SUBJECTIVE AND OBJECTIVE BOX
[facility]                                              CARDIOLOGY AT [place] [address]                                             Telephone: [phone]. Fax:[phone]                                                         CARDIOLOGY CONSULTATION NOTE                                                                                             Consult requested by:  Dr Atkins  History obtained by: Patient and medical record  Community Cardiologist:  APOLLO   obtained: Yes [  ] No [ x ]  Reason for Consultation:  Risk statififcation  Avialable out pt records reviewed: Yes [  ] No [  ]    Chief complaint:    Patient is a 89y old  Female who presents with a chief complaint of perf appendicitis (29 May 2025 02:59)        HPI:      HPI: 89y Female PMHX htn, hypothyroidism, CAD. afib , Pt had stents place d1.5 mos ago  ( on eliquis and plavix last taken yest) and is wearing a life vest. presented of 1 day of abd pain associated with nausea ,   Patient denies fevers/chills, denies lightheadedness/dizziness, denies SOB/chest pain, denies nausea/vomiting, denies constipation/diarrhea.      last colonoscopy was more than 15 years ago     ROS: 10-system review is otherwise negative except HPI above.      PAST MEDICAL & SURGICAL HISTORY:  History of COPD      Hypothyroidism      HTN (hypertension)      HLD (hyperlipidemia)        FAMILY HISTORY:  FH: HTN (hypertension) (Father)      Family history not pertinent as reviewed with the patient.    SOCIAL HISTORY:  Denies any toxic habits    ALLERGIES: NKA Allergy Status Unknown      HOME MEDICATIONS:   Home Medications:  Advair Diskus 250 mcg-50 mcg/inh inhalation powder: 1 inhaled 2 times a day (08 Apr 2025 18:45)  amLODIPine 5 mg oral tablet: 1 tab(s) orally once a day (08 Apr 2025 18:45)  levothyroxine 125 mcg (0.125 mg) oral tablet: 1 tab(s) orally once a day (08 Apr 2025 18:45)  Spiriva 18 mcg inhalation capsule: 1 cap(s) inhaled once a day (08 Apr 2025 18:44)      --------------------------------------------------------------------------------------------    PHYSICAL EXAM:   General: NAD, Lying in bed comfortably  Neuro: A+Ox3  HEENT: EOMI, PERRLA, MMM  Cardio: RRR  Resp: Non labored breathing on RA  GI/Abd: Soft, RLQ T/ND, no rebound/guarding, no masses palpated    --------------------------------------------------------------------------------------------    LABS                 10.3   20.88  )----------(  263       ( 28 May 2025 22:45 )               33.5      138    |  99     |  25.4   ----------------------------<  101        ( 28 May 2025 22:45 )  4.7     |  24.0   |  1.35     Ca    8.9        ( 28 May 2025 22:45 )    TPro  6.5    /  Alb  3.2    /  TBili  0.6    /  DBili  x      /  AST  33     /  ALT  29     /  AlkPhos  74     ( 28 May 2025 22:45 )    LIVER FUNCTIONS - ( 28 May 2025 22:45 )  Alb: 3.2 g/dL / Pro: 6.5 g/dL / ALK PHOS: 74 U/L / ALT: 29 U/L / AST: 33 U/L / GGT: x               CAPILLARY BLOOD GLUCOSE        Urinalysis Basic - ( 28 May 2025 22:45 )    Color: x / Appearance: x / SG: x / pH: x  Gluc: 101 mg/dL / Ketone: x  / Bili: x / Urobili: x   Blood: x / Protein: x / Nitrite: x   Leuk Esterase: x / RBC: x / WBC x   Sq Epi: x / Non Sq Epi: x / Bacteria: x        01:52 - VBG - pH:       | pCO2:       | pO2:       | Lactate: 1.50     --------------------------------------------------------------------------------------------       (29 May 2025 02:59)        CARDIAC TESTING   ECHO:    STRESS:    CATH:     ELECTROPHYSIOLOGY:       PAST MEDICAL HISTORY  History of COPD    Hypothyroidism    HTN (hypertension)    HLD (hyperlipidemia)          PAST SURGICAL HISTORY        SOCIAL HISTORY:  Denies smoking/alcohol/drugs  CIGARETTES:     ALCOHOL:  DRUGS:      FAMILY HISTORY:  FAMILY HISTORY:  FH: HTN (hypertension) (Father)        Family History of Cardiovascular Disease:  Yes [  ] No [  ]  Coronary Artery Disease in first degree relative: Yes [  ] No [  ]  Sudden Cardiac Death in First degree relative: Yes [  ] No [  ]      HOME MEDICATIONS:      CURRENT MEDICATIONS:  aMIOdarone    Tablet 200 milliGRAM(s) Oral daily  amLODIPine   Tablet 5 milliGRAM(s) Oral daily  metoprolol tartrate 25 milliGRAM(s) Oral two times a day     acetaminophen     Tablet ..  atorvastatin  heparin  Infusion.  lactated ringers.  levothyroxine  piperacillin/tazobactam IVPB..        ALLERGIES: Allergies    Allergy Status Unknown    Intolerances          REVIEW OF SYMPTOMS:   CONSTITUTIONAL: No fever, no chills, no weight loss, no weight gain, no fatigue   ENMT:  No vertigo; No sinus or throat pain  NECK: No pain or stiffness  CARDIOVASCULAR: No chest pain, no dyspnea, no syncope/presyncope, no palpitations, no dizziness, no Orthopnea, no Paroxsymal nocturnal dyspnea  RESPIRATORY: no Shortness of breath, no cough, no wheezing  : No dysuria, no hematuria   GI: No dark color stool, no nausea, no diarrhea, no constipation, no abdominal pain   NEURO: No headache, no slurred speech   MUSCULOSKELETAL: No joint pain or swelling; No muscle, back, or extremity pain  PSYCH: No agitation, no anxiety.    ALL OTHER REVIEW OF SYSTEMS ARE NEGATIVE.      Vital Signs Last 24 Hrs  T(C): 37.3 (29 May 2025 07:32), Max: 37.6 (28 May 2025 21:37)  T(F): 99.1 (29 May 2025 07:32), Max: 99.6 (28 May 2025 21:37)  HR: 66 (29 May 2025 07:32) (65 - 76)  BP: 149/77 (29 May 2025 07:32) (131/79 - 170/57)  BP(mean): 82 (29 May 2025 04:55) (82 - 97)  RR: 18 (29 May 2025 07:32) (18 - 25)  SpO2: 94% (29 May 2025 07:32) (94% - 98%)    Parameters below as of 29 May 2025 07:32  Patient On (Oxygen Delivery Method): room air      INTAKE AND OUTPUT:     PHYSICAL EXAM:  Constitutional: Comfortable . No acute distress.   HEENT: Atraumatic and normocephalic , neck is supple . no JVD. No carotid bruit.  CNS: A&Ox3. No focal deficits.   Respiratory: CTAB, unlabored   Cardiovascular: RRR normal s1 s2. No murmur. No rubs or gallop.  Gastrointestinal: Soft, non-tender. +Bowel sounds.   MSK: full ROM extremities x 4  Extremities: No edema. No cyanosis   Psychiatric: Calm . no agitation.   Skin: Warm and dry, no ulcers on extremities       LABS:                        10.3   20.88 )-----------( 263      ( 28 May 2025 22:45 )             33.5     05-28    138  |  99  |  25.4[H]  ----------------------------<  101[H]  4.7   |  24.0  |  1.35[H]    Ca    8.9      28 May 2025 22:45    TPro  6.5[L]  /  Alb  3.2[L]  /  TBili  0.6  /  DBili  x   /  AST  33[H]  /  ALT  29  /  AlkPhos  74  05-28      ;p-BNP=    Urinalysis Basic - ( 29 May 2025 03:16 )    Color: Yellow / Appearance: Clear / SG: >1.030 / pH: x  Gluc: x / Ketone: x  / Bili: Negative / Urobili: 1.0 mg/dL   Blood: x / Protein: Trace mg/dL / Nitrite: Negative   Leuk Esterase: Negative / RBC: x / WBC x   Sq Epi: x / Non Sq Epi: x / Bacteria: x          INTERPRETATION OF TELEMETRY:     ECG:   Prior ECG: Yes [  ] No [  ]      RADIOLOGY & ADDITIONAL STUDIES:    X-ray:  reviewed by me.   CT scan:   MRI:   US:                                                [facility]                                              CARDIOLOGY AT [address], [place] [address]                                             Telephone: [phone]. Fax:[phone]                                                         CARDIOLOGY CONSULTATION NOTE                                                                                             Consult requested by:  Dr Atkins  History obtained by: Patient and medical record  Community Cardiologist: Isai   obtained: Yes [  ] No [ x ]  Reason for Consultation:  Risk statification  Avialable out pt records reviewed: Yes [  ] No [ x ]      THis is a 89y Female PMHX  Copd. htn, hypothyroidism,  4/8/25->  CAD. s/p torsades, and thrombotic lesion of the proximal OM1 ( life vest on no firings),  afib presents to the ER with right lower abdominal pain and nausea  and found to have a acute appendicitis and  perforation.  Denies any chest pain sob or palpitations,    CARDIAC TESTING   ECHO:  < from: TTE W or WO Ultrasound Enhancing Agent (04.08.25 @ 13:39) >   1. Left ventricular cavity is normal in size. Left ventricular wall thickness is normal. Left ventricular systolic function is normal with an ejection fraction visually estimated at 60 to 65 %.   2. There is moderate (grade 2) left ventricular diastolic dysfunction, with elevated left ventricular filling pressure.   3. There is normal LV mass and normal geometry.   4. Normal right ventricular cavity size and normal right ventricular systolic function.   5. Left atrium is moderately dilated.   6. The right atrium is normal in size.   7. Trileaflet aortic valve. There is moderate calcification of the aortic valve leaflets. Mild to moderate aortic stenosis.   8. Mild aortic regurgitation.   9. The peak transaortic velocity is 2.00 m/s, peak transaortic gradient is 16.0 mmHg and mean transaortic gradient is 7.9 mmHg with an LVOT/aortic valve VTI ratio of 0.45. The aortic valve acceleration time is 74 msec. The effective orifice area is estimated at 1.52 cm² by the continuity equation.  10. Moderate mitral valve leaflet calcification.  11. There is severe calcification of the mitral valve annulus.  12. Trace mitral regurgitation.  13. Mild tricuspid regurgitation.  14. Estimated pulmonary artery systolic pressure is 30 mmHg.    STRESS:    CATH: < from: Cardiac Catheterization (04.08.25 @ 18:21) >  Coronary Angiography   The coronary circulation is right dominant.    LAD   Left anterior descending artery: Angiography shows no disease.    CX   Circumflex: Angiography shows no disease.    RCA   Right coronary artery: Angiography shows no disease.    Ramus   Ramus intermedius: 90% acute thrombotic lesion of large vessel ramus.  Stented to0. .      PAST MEDICAL HISTORY  CAD  History of COPD  Hypothyroidism  HTN (hypertension)  HLD (hyperlipidemia)    PAST SURGICAL HISTORY  Breast biopsy    FAMILY HISTORY:  FH: HTN (hypertension) (Father)  No family hx of sudden death    ALLERGIES: NKA Allergy Status Unknown    SOCIAL HISTORY:  Denies smoking/alcohol/drugs      FAMILY HISTORY:  FH: HTN (hypertension) (Father)  Family History of Cardiovascular Disease:  Yes [  ] No [ x ]  Coronary Artery Disease in first degree relative: Yes [  ] No [ x ]  Sudden Cardiac Death in First degree relative: Yes [  ] No [ x ]    ALLERGIES: Allergies  NKDA  HOME MEDICATIONS:         amiodarone 200mg qd       Plavix 75mg qd       Eliquis 5 mg bid      Metoprolol 25mg bid      Lipitor 80mg qd      Norvasc 5mg qd    --------------------------------------------------------------------------------------------  REVIEW OF SYMPTOMS:   CONSTITUTIONAL: No fever, no chills, no weight loss, no weight gain, no fatigue   ENMT:  No vertigo; No sinus or throat pain  NECK: No pain or stiffness  CARDIOVASCULAR: No chest pain, no dyspnea, no syncope/presyncope, no palpitations, no dizziness, no Orthopnea, no Paroxsymal nocturnal dyspnea  RESPIRATORY: no Shortness of breath, no cough, no wheezing  : No dysuria, no hematuria   GI:  see hpi  NEURO: No headache, no slurred speech   MUSCULOSKELETAL: No joint pain or swelling; No muscle, back, or extremity pain  PSYCH: No agitation, no anxiety.    ALL OTHER REVIEW OF SYSTEMS ARE NEGATIVE.      Vital Signs Last 24 Hrs  T(C): 37.3 (29 May 2025 07:32), Max: 37.6 (28 May 2025 21:37)  T(F): 99.1 (29 May 2025 07:32), Max: 99.6 (28 May 2025 21:37)  HR: 66 (29 May 2025 07:32) (65 - 76)  BP: 149/77 (29 May 2025 07:32) (131/79 - 170/57)  BP(mean): 82 (29 May 2025 04:55) (82 - 97)  RR: 18 (29 May 2025 07:32) (18 - 25)  SpO2: 94% (29 May 2025 07:32) (94% - 98%)    Parameters below as of 29 May 2025 07:32  Patient On (Oxygen Delivery Method): room air      INTAKE AND OUTPUT:     PHYSICAL EXAM:  Constitutional: Comfortable . No acute distress.   HEENT: Atraumatic and normocephalic , neck is supple . no JVD. No carotid bruit.  CNS: A&Ox3. No focal deficits.   Respiratory: CTAB, unlabored   Cardiovascular: RRR normal s1 s2. No murmur. No rubs or gallop.  Gastrointestinal: Soft, . +Bowel sounds. tenderness in right lower quad   MSK: full ROM extremities x 4  Extremities: No edema. No cyanosis   Psychiatric: Calm . no agitation.   Skin: Warm and dry, no ulcers on extremities         LABS                 10.3   20.88  )----------(  263       ( 28 May 2025 22:45 )               33.5      138    |  99     |  25.4   ----------------------------<  101        ( 28 May 2025 22:45 )  4.7     |  24.0   |  1.35     Ca    8.9        ( 28 May 2025 22:45 )    TPro  6.5    /  Alb  3.2    /  TBili  0.6    /  DBili  x      /  AST  33     /  ALT  29     /  AlkPhos  74     ( 28 May 2025 22:45 )    LIVER FUNCTIONS - ( 28 May 2025 22:45 )  Alb: 3.2 g/dL / Pro: 6.5 g/dL / ALK PHOS: 74 U/L / ALT: 29 U/L / AST: 33 U/L / GGT: x               CAPILLARY BLOOD GLUCOSE    Urrinalysis Basic - ( 28 May 2025 22:45 )    Color: x / Appearance: x / SG: x / pH: x  Gluc: 101 mg/dL / Ketone: x  / Bili: x / Urobili: x   Blood: x / Protein: x / Nitrite: x   Leuk Esterase: x / RBC: x / WBC x   Sq Epi: x / Non Sq Epi: x / Bacteria: x        01:52 - VBG - pH:       | pCO2:       | pO2:       | Lactate: 1.50     --------------------------------------------------------------------------------------------    LABS:                        10.3   20.88 )-----------( 263      ( 28 May 2025 22:45 )             33.5     05-28    138  |  99  |  25.4[H]  ----------------------------<  101[H]  4.7   |  24.0  |  1.35[H]    Ca    8.9      28 May 2025 22:45    TPro  6.5[L]  /  Alb  3.2[L]  /  TBili  0.6  /  DBili  x   /  AST  33[H]  /  ALT  29  /  AlkPhos  74  05-28      ;p-BNP=    Urinalysis Basic - ( 29 May 2025 03:16 )    Color: Yellow / Appearance: Clear / SG: >1.030 / pH: x  Gluc: x / Ketone: x  / Bili: Negative / Urobili: 1.0 mg/dL   Blood: x / Protein: Trace mg/dL / Nitrite: Negative   Leuk Esterase: Negative / RBC: x / WBC x   Sq Epi: x / Non Sq Epi: x / Bacteria: x    INTERPRETATION OF TELEMETRY: NSR    ECG: NSR Lateral wall t wave changes unchanged from previous ekg  Prior ECG: Yes [  ] No [  ]      RADIOLOGY & ADDITIONAL STUDIES:    X-ray:  reviewed by me. Chest x ray is pending

## 2025-05-29 NOTE — CONSULT NOTE ADULT - NS ATTEND AMEND GEN_ALL_CORE FT
89y Female PMHX  Copd. htn, hypothyroidism,  4/8/25->  CAD. s/p torsades, and thrombotic lesion of the proximal OM1 ( life vest on no firings),  afib presents to the ER with right lower abdominal pain and nausea and found to have a acute appendicitis and  perforation.  Denies any chest pain sob or palpitations,      if surgery needed it will be emergent  Patient high risk for high risk procedure  no further testing necessary prior to surgery    Hold Eliquis (for Afib) at this time due to perforation pending surgery plans.  Bridging with IV  heparin was discontinued by Surgery d/t perforation.    If surgery is planned:  if pt can continue with at least with one antiplatelet (SAPT) it would help in preventing Stent thrombosis since the PCI-stent was very recent.    If surgery is not planned:  consider to cont Plavix if OK bu Surgery.     If surgery deems no antiplatelets can be continued d/t perforation than Hold antiplatelets with understanding that pt represents high risk of in-stent thrombosis.    if antiplatelets held early reintroduction of antithrombotics post-op is critical.  Resume Eliquis post-op as soon as deemed safe as per surgery    Recc Avoid neuroaxial blocks.    Telemetry monitoring is recc.    ECG, troponin, PBNP post op is recommended.     discussed with patient. 89y Female PMHX  Copd. htn, hypothyroidism,  4/8/25->  CAD. s/p torsades, and thrombotic lesion of the proximal OM1 ( life vest on no firings),  afib presents to the ER with right lower abdominal pain and nausea and found to have a acute appendicitis and  perforation.  Denies any chest pain sob or palpitations,      if surgery needed it will be emergent  Patient high risk for high risk procedure  no further testing necessary prior to surgery    d/w surgery as in below:    No plan for emergent surgery at this time.   pt may have IR procedure and later may need surgery.    Given recent PCI-stent:  would recc cangrelor while holding clopidogrel.    GIven Afib:  would recc IV-Heparin bridging while ELiquis is held.    Recc Avoid neuroaxial blocks.    Telemetry monitoring is recc.    ECG, troponin, PBNP post op is recommended.     discussed with patient.

## 2025-05-29 NOTE — ED PROVIDER NOTE - PROGRESS NOTE DETAILS
Ary: pt signed out to me. with perfed appy, with some abscess, surgery consulted (cards as well at their request). cultures, abx. HD stable, feeling well. Ary: I spoke to surgery team, including Dr. Atkins, admitted.

## 2025-05-29 NOTE — CONSULT NOTE ADULT - PROBLEM SELECTOR RECOMMENDATION 2
c/w Plavix if ok with surgery  will hold Eliquis (for Afib) at this time due to perforation pending surgery plans  continue metoprolol Lipitor and Plavix  Patient has no chest pain or sob antiplatelets management as in above  will hold Eliquis (for Afib) at this time due to perforation pending surgery plans  continue metoprolol Lipitor and  Patient has no chest pain or sob antiplatelets management as in above  continue metoprolol Lipitor and  Patient has no chest pain or sob

## 2025-05-29 NOTE — PATIENT PROFILE ADULT - FUNCTIONAL ASSESSMENT - BASIC MOBILITY 4.
Subjective   Patient ID: Ramsey Fisher is a 82 y.o. male who presents for No chief complaint on file..    HPI   The patient for first time septated Franchi no chest pain no shortness of breath no side effect with medication discussed with prostate and prostate symptoms    Past medical history renal lithiasis borderline hyperlipidemia    Medications noted    Allergies no known drug allergies social history    No tobacco    Family history noted and unchanged    Prevention Limited exercise some prior results reviewed no longer having colonoscopy    Depression screen not depressed  Review of Systems    Objective   There were no vitals taken for this visit.    Physical Exam vital signs noted alert and oriented x 3 NCAT PERRLA EOMI nares without discharge OP benign no AC nodes no thyromegaly no JVD or bruit chest clear to auscultation and percussion CV regular rate and rhythm S1-S2 without murmur gallop or rub abdomen soft nontender normal active bowel sounds LS spine normal curvature negative straight leg raise no flank tenderness extremities no clubbing cyanosis or edema normal distal pulses DTR 2+    Assessment/Plan impression elevated blood pressure without hypertension BPH with nocturia hyperlipidemia (glucose intolerance)  plan discussed with prior results including slightly elevated PSA slightly elevated glucose    check EKG advised on heart and blood pressure good blood pressure medication check comprehensive panel advised on glucose potassium and kidney function as well as liver function check CBC advised on blood count check lipid panel advised on cholesterol profile check PSA advised on prostate see prior check urinalysis advised on possibility probability of infection inflammation or irritation good overall diet regular exercise good water consumption recheck based on above TT 50 cc 26        
3 = A little assistance

## 2025-05-30 LAB
ANION GAP SERPL CALC-SCNC: 12 MMOL/L — SIGNIFICANT CHANGE UP (ref 5–17)
ANION GAP SERPL CALC-SCNC: 14 MMOL/L — SIGNIFICANT CHANGE UP (ref 5–17)
APTT BLD: 122.9 SEC — CRITICAL HIGH (ref 26.1–36.8)
APTT BLD: 84.8 SEC — HIGH (ref 26.1–36.8)
APTT BLD: 86.1 SEC — HIGH (ref 26.1–36.8)
BUN SERPL-MCNC: 19.8 MG/DL — SIGNIFICANT CHANGE UP (ref 8–20)
BUN SERPL-MCNC: 20.3 MG/DL — HIGH (ref 8–20)
CALCIUM SERPL-MCNC: 8.3 MG/DL — LOW (ref 8.4–10.5)
CALCIUM SERPL-MCNC: 8.4 MG/DL — SIGNIFICANT CHANGE UP (ref 8.4–10.5)
CHLORIDE SERPL-SCNC: 100 MMOL/L — SIGNIFICANT CHANGE UP (ref 96–108)
CHLORIDE SERPL-SCNC: 99 MMOL/L — SIGNIFICANT CHANGE UP (ref 96–108)
CO2 SERPL-SCNC: 22 MMOL/L — SIGNIFICANT CHANGE UP (ref 22–29)
CO2 SERPL-SCNC: 24 MMOL/L — SIGNIFICANT CHANGE UP (ref 22–29)
CREAT ?TM UR-MCNC: 157 MG/DL — SIGNIFICANT CHANGE UP
CREAT SERPL-MCNC: 1.42 MG/DL — HIGH (ref 0.5–1.3)
CREAT SERPL-MCNC: 1.53 MG/DL — HIGH (ref 0.5–1.3)
EGFR: 32 ML/MIN/1.73M2 — LOW
EGFR: 32 ML/MIN/1.73M2 — LOW
EGFR: 35 ML/MIN/1.73M2 — LOW
EGFR: 35 ML/MIN/1.73M2 — LOW
GLUCOSE SERPL-MCNC: 117 MG/DL — HIGH (ref 70–99)
GLUCOSE SERPL-MCNC: 85 MG/DL — SIGNIFICANT CHANGE UP (ref 70–99)
HCT VFR BLD CALC: 33.2 % — LOW (ref 34.5–45)
HCT VFR BLD CALC: 33.7 % — LOW (ref 34.5–45)
HGB BLD-MCNC: 10.3 G/DL — LOW (ref 11.5–15.5)
HGB BLD-MCNC: 9.9 G/DL — LOW (ref 11.5–15.5)
MAGNESIUM SERPL-MCNC: 1.7 MG/DL — SIGNIFICANT CHANGE UP (ref 1.6–2.6)
MAGNESIUM SERPL-MCNC: 2.4 MG/DL — SIGNIFICANT CHANGE UP (ref 1.6–2.6)
MCHC RBC-ENTMCNC: 29.8 G/DL — LOW (ref 32–36)
MCHC RBC-ENTMCNC: 30.1 PG — SIGNIFICANT CHANGE UP (ref 27–34)
MCHC RBC-ENTMCNC: 30.4 PG — SIGNIFICANT CHANGE UP (ref 27–34)
MCHC RBC-ENTMCNC: 30.6 G/DL — LOW (ref 32–36)
MCV RBC AUTO: 100.9 FL — HIGH (ref 80–100)
MCV RBC AUTO: 99.4 FL — SIGNIFICANT CHANGE UP (ref 80–100)
NRBC # BLD AUTO: 0 K/UL — SIGNIFICANT CHANGE UP (ref 0–0)
NRBC # BLD AUTO: 0 K/UL — SIGNIFICANT CHANGE UP (ref 0–0)
NRBC # FLD: 0 K/UL — SIGNIFICANT CHANGE UP (ref 0–0)
NRBC # FLD: 0 K/UL — SIGNIFICANT CHANGE UP (ref 0–0)
NRBC BLD AUTO-RTO: 0 /100 WBCS — SIGNIFICANT CHANGE UP (ref 0–0)
NRBC BLD AUTO-RTO: 0 /100 WBCS — SIGNIFICANT CHANGE UP (ref 0–0)
PHOSPHATE SERPL-MCNC: 3.2 MG/DL — SIGNIFICANT CHANGE UP (ref 2.4–4.7)
PHOSPHATE SERPL-MCNC: 3.5 MG/DL — SIGNIFICANT CHANGE UP (ref 2.4–4.7)
PLATELET # BLD AUTO: 257 K/UL — SIGNIFICANT CHANGE UP (ref 150–400)
PLATELET # BLD AUTO: 257 K/UL — SIGNIFICANT CHANGE UP (ref 150–400)
PMV BLD: 11.3 FL — SIGNIFICANT CHANGE UP (ref 7–13)
PMV BLD: 11.8 FL — SIGNIFICANT CHANGE UP (ref 7–13)
POTASSIUM SERPL-MCNC: 4.1 MMOL/L — SIGNIFICANT CHANGE UP (ref 3.5–5.3)
POTASSIUM SERPL-MCNC: 4.2 MMOL/L — SIGNIFICANT CHANGE UP (ref 3.5–5.3)
POTASSIUM SERPL-SCNC: 4.1 MMOL/L — SIGNIFICANT CHANGE UP (ref 3.5–5.3)
POTASSIUM SERPL-SCNC: 4.2 MMOL/L — SIGNIFICANT CHANGE UP (ref 3.5–5.3)
RBC # BLD: 3.29 M/UL — LOW (ref 3.8–5.2)
RBC # BLD: 3.39 M/UL — LOW (ref 3.8–5.2)
RBC # FLD: 13.2 % — SIGNIFICANT CHANGE UP (ref 10.3–14.5)
RBC # FLD: 13.4 % — SIGNIFICANT CHANGE UP (ref 10.3–14.5)
SODIUM SERPL-SCNC: 135 MMOL/L — SIGNIFICANT CHANGE UP (ref 135–145)
SODIUM SERPL-SCNC: 136 MMOL/L — SIGNIFICANT CHANGE UP (ref 135–145)
SODIUM UR-SCNC: 32 MMOL/L — SIGNIFICANT CHANGE UP
WBC # BLD: 21 K/UL — HIGH (ref 3.8–10.5)
WBC # BLD: 22.1 K/UL — HIGH (ref 3.8–10.5)
WBC # FLD AUTO: 21 K/UL — HIGH (ref 3.8–10.5)
WBC # FLD AUTO: 22.1 K/UL — HIGH (ref 3.8–10.5)

## 2025-05-30 PROCEDURE — 76856 US EXAM PELVIC COMPLETE: CPT | Mod: 26

## 2025-05-30 PROCEDURE — 99232 SBSQ HOSP IP/OBS MODERATE 35: CPT

## 2025-05-30 RX ORDER — ALBUTEROL SULFATE 2.5 MG/3ML
1 VIAL, NEBULIZER (ML) INHALATION EVERY 6 HOURS
Refills: 0 | Status: DISCONTINUED | OUTPATIENT
Start: 2025-05-30 | End: 2025-06-01

## 2025-05-30 RX ORDER — HEPARIN SODIUM 1000 [USP'U]/ML
800 INJECTION INTRAVENOUS; SUBCUTANEOUS
Qty: 25000 | Refills: 0 | Status: DISCONTINUED | OUTPATIENT
Start: 2025-05-30 | End: 2025-06-03

## 2025-05-30 RX ORDER — ALBUTEROL SULFATE 2.5 MG/3ML
2 VIAL, NEBULIZER (ML) INHALATION EVERY 6 HOURS
Refills: 0 | Status: DISCONTINUED | OUTPATIENT
Start: 2025-05-30 | End: 2025-05-30

## 2025-05-30 RX ORDER — LIDOCAINE HYDROCHLORIDE 20 MG/ML
2 JELLY TOPICAL EVERY 24 HOURS
Refills: 0 | Status: DISCONTINUED | OUTPATIENT
Start: 2025-05-30 | End: 2025-06-03

## 2025-05-30 RX ORDER — HEPARIN SODIUM 1000 [USP'U]/ML
5000 INJECTION INTRAVENOUS; SUBCUTANEOUS EVERY 6 HOURS
Refills: 0 | Status: DISCONTINUED | OUTPATIENT
Start: 2025-05-30 | End: 2025-06-03

## 2025-05-30 RX ORDER — HEPARIN SODIUM 1000 [USP'U]/ML
2500 INJECTION INTRAVENOUS; SUBCUTANEOUS EVERY 6 HOURS
Refills: 0 | Status: DISCONTINUED | OUTPATIENT
Start: 2025-05-30 | End: 2025-06-03

## 2025-05-30 RX ORDER — MAGNESIUM SULFATE 500 MG/ML
2 SYRINGE (ML) INJECTION ONCE
Refills: 0 | Status: COMPLETED | OUTPATIENT
Start: 2025-05-30 | End: 2025-05-30

## 2025-05-30 RX ORDER — ALBUTEROL SULFATE 2.5 MG/3ML
1.25 VIAL, NEBULIZER (ML) INHALATION EVERY 6 HOURS
Refills: 0 | Status: DISCONTINUED | OUTPATIENT
Start: 2025-05-30 | End: 2025-06-01

## 2025-05-30 RX ADMIN — HEPARIN SODIUM 800 UNIT(S)/HR: 1000 INJECTION INTRAVENOUS; SUBCUTANEOUS at 19:54

## 2025-05-30 RX ADMIN — SODIUM CHLORIDE 65 MILLILITER(S): 9 INJECTION, SOLUTION INTRAVENOUS at 08:02

## 2025-05-30 RX ADMIN — SODIUM CHLORIDE 75 MILLILITER(S): 9 INJECTION, SOLUTION INTRAVENOUS at 14:10

## 2025-05-30 RX ADMIN — LIDOCAINE HYDROCHLORIDE 2 PATCH: 20 JELLY TOPICAL at 19:17

## 2025-05-30 RX ADMIN — Medication 975 MILLIGRAM(S): at 14:11

## 2025-05-30 RX ADMIN — HEPARIN SODIUM 800 UNIT(S)/HR: 1000 INJECTION INTRAVENOUS; SUBCUTANEOUS at 22:23

## 2025-05-30 RX ADMIN — AMIODARONE HYDROCHLORIDE 200 MILLIGRAM(S): 50 INJECTION, SOLUTION INTRAVENOUS at 06:38

## 2025-05-30 RX ADMIN — Medication 975 MILLIGRAM(S): at 15:10

## 2025-05-30 RX ADMIN — Medication 25 GRAM(S): at 06:37

## 2025-05-30 RX ADMIN — Medication 1 PUFF(S): at 16:37

## 2025-05-30 RX ADMIN — Medication 25 GRAM(S): at 14:10

## 2025-05-30 RX ADMIN — Medication 125 MICROGRAM(S): at 06:38

## 2025-05-30 RX ADMIN — AMLODIPINE BESYLATE 5 MILLIGRAM(S): 10 TABLET ORAL at 06:38

## 2025-05-30 RX ADMIN — HEPARIN SODIUM 800 UNIT(S)/HR: 1000 INJECTION INTRAVENOUS; SUBCUTANEOUS at 07:56

## 2025-05-30 RX ADMIN — HEPARIN SODIUM 800 UNIT(S)/HR: 1000 INJECTION INTRAVENOUS; SUBCUTANEOUS at 11:44

## 2025-05-30 RX ADMIN — HEPARIN SODIUM 800 UNIT(S)/HR: 1000 INJECTION INTRAVENOUS; SUBCUTANEOUS at 04:11

## 2025-05-30 RX ADMIN — METOPROLOL SUCCINATE 25 MILLIGRAM(S): 50 TABLET, EXTENDED RELEASE ORAL at 19:17

## 2025-05-30 RX ADMIN — Medication 975 MILLIGRAM(S): at 07:38

## 2025-05-30 RX ADMIN — Medication 975 MILLIGRAM(S): at 06:38

## 2025-05-30 RX ADMIN — METOPROLOL SUCCINATE 25 MILLIGRAM(S): 50 TABLET, EXTENDED RELEASE ORAL at 06:38

## 2025-05-30 RX ADMIN — Medication 975 MILLIGRAM(S): at 00:23

## 2025-05-30 RX ADMIN — Medication 25 GRAM(S): at 21:10

## 2025-05-30 RX ADMIN — CLOPIDOGREL BISULFATE 75 MILLIGRAM(S): 75 TABLET, FILM COATED ORAL at 14:11

## 2025-05-30 RX ADMIN — ATORVASTATIN CALCIUM 80 MILLIGRAM(S): 80 TABLET, FILM COATED ORAL at 21:10

## 2025-05-30 RX ADMIN — Medication 975 MILLIGRAM(S): at 21:10

## 2025-05-31 LAB
ANION GAP SERPL CALC-SCNC: 13 MMOL/L — SIGNIFICANT CHANGE UP (ref 5–17)
APTT BLD: 67.3 SEC — HIGH (ref 26.1–36.8)
BASOPHILS # BLD AUTO: 0.07 K/UL — SIGNIFICANT CHANGE UP (ref 0–0.2)
BASOPHILS NFR BLD AUTO: 0.3 % — SIGNIFICANT CHANGE UP (ref 0–2)
BUN SERPL-MCNC: 18.8 MG/DL — SIGNIFICANT CHANGE UP (ref 8–20)
CALCIUM SERPL-MCNC: 8.4 MG/DL — SIGNIFICANT CHANGE UP (ref 8.4–10.5)
CHLORIDE SERPL-SCNC: 99 MMOL/L — SIGNIFICANT CHANGE UP (ref 96–108)
CO2 SERPL-SCNC: 24 MMOL/L — SIGNIFICANT CHANGE UP (ref 22–29)
CREAT SERPL-MCNC: 1.47 MG/DL — HIGH (ref 0.5–1.3)
EGFR: 34 ML/MIN/1.73M2 — LOW
EGFR: 34 ML/MIN/1.73M2 — LOW
EOSINOPHIL # BLD AUTO: 1.12 K/UL — HIGH (ref 0–0.5)
EOSINOPHIL NFR BLD AUTO: 5.5 % — SIGNIFICANT CHANGE UP (ref 0–6)
GLUCOSE SERPL-MCNC: 77 MG/DL — SIGNIFICANT CHANGE UP (ref 70–99)
HCT VFR BLD CALC: 33.5 % — LOW (ref 34.5–45)
HGB BLD-MCNC: 10.1 G/DL — LOW (ref 11.5–15.5)
IMM GRANULOCYTES # BLD AUTO: 0.18 K/UL — HIGH (ref 0–0.07)
IMM GRANULOCYTES NFR BLD AUTO: 0.9 % — SIGNIFICANT CHANGE UP (ref 0–0.9)
LYMPHOCYTES # BLD AUTO: 1.72 K/UL — SIGNIFICANT CHANGE UP (ref 1–3.3)
LYMPHOCYTES NFR BLD AUTO: 8.4 % — LOW (ref 13–44)
MAGNESIUM SERPL-MCNC: 2.3 MG/DL — SIGNIFICANT CHANGE UP (ref 1.6–2.6)
MCHC RBC-ENTMCNC: 30.1 G/DL — LOW (ref 32–36)
MCHC RBC-ENTMCNC: 30.1 PG — SIGNIFICANT CHANGE UP (ref 27–34)
MCV RBC AUTO: 99.7 FL — SIGNIFICANT CHANGE UP (ref 80–100)
MONOCYTES # BLD AUTO: 1.62 K/UL — HIGH (ref 0–0.9)
MONOCYTES NFR BLD AUTO: 7.9 % — SIGNIFICANT CHANGE UP (ref 2–14)
NEUTROPHILS # BLD AUTO: 15.81 K/UL — HIGH (ref 1.8–7.4)
NEUTROPHILS NFR BLD AUTO: 77 % — SIGNIFICANT CHANGE UP (ref 43–77)
NRBC # BLD AUTO: 0 K/UL — SIGNIFICANT CHANGE UP (ref 0–0)
NRBC # FLD: 0 K/UL — SIGNIFICANT CHANGE UP (ref 0–0)
NRBC BLD AUTO-RTO: 0 /100 WBCS — SIGNIFICANT CHANGE UP (ref 0–0)
PHOSPHATE SERPL-MCNC: 3.7 MG/DL — SIGNIFICANT CHANGE UP (ref 2.4–4.7)
PLATELET # BLD AUTO: 272 K/UL — SIGNIFICANT CHANGE UP (ref 150–400)
PMV BLD: 11.5 FL — SIGNIFICANT CHANGE UP (ref 7–13)
POTASSIUM SERPL-MCNC: 4.1 MMOL/L — SIGNIFICANT CHANGE UP (ref 3.5–5.3)
POTASSIUM SERPL-SCNC: 4.1 MMOL/L — SIGNIFICANT CHANGE UP (ref 3.5–5.3)
RBC # BLD: 3.36 M/UL — LOW (ref 3.8–5.2)
RBC # FLD: 13.2 % — SIGNIFICANT CHANGE UP (ref 10.3–14.5)
SODIUM SERPL-SCNC: 136 MMOL/L — SIGNIFICANT CHANGE UP (ref 135–145)
WBC # BLD: 20.52 K/UL — HIGH (ref 3.8–10.5)
WBC # FLD AUTO: 20.52 K/UL — HIGH (ref 3.8–10.5)

## 2025-05-31 PROCEDURE — 99231 SBSQ HOSP IP/OBS SF/LOW 25: CPT

## 2025-05-31 RX ADMIN — HEPARIN SODIUM 800 UNIT(S)/HR: 1000 INJECTION INTRAVENOUS; SUBCUTANEOUS at 08:01

## 2025-05-31 RX ADMIN — Medication 975 MILLIGRAM(S): at 01:10

## 2025-05-31 RX ADMIN — AMLODIPINE BESYLATE 5 MILLIGRAM(S): 10 TABLET ORAL at 05:55

## 2025-05-31 RX ADMIN — Medication 25 GRAM(S): at 22:01

## 2025-05-31 RX ADMIN — HEPARIN SODIUM 800 UNIT(S)/HR: 1000 INJECTION INTRAVENOUS; SUBCUTANEOUS at 19:34

## 2025-05-31 RX ADMIN — Medication 1 PUFF(S): at 16:16

## 2025-05-31 RX ADMIN — Medication 975 MILLIGRAM(S): at 14:56

## 2025-05-31 RX ADMIN — METOPROLOL SUCCINATE 25 MILLIGRAM(S): 50 TABLET, EXTENDED RELEASE ORAL at 18:57

## 2025-05-31 RX ADMIN — Medication 25 GRAM(S): at 16:04

## 2025-05-31 RX ADMIN — Medication 975 MILLIGRAM(S): at 13:56

## 2025-05-31 RX ADMIN — Medication 975 MILLIGRAM(S): at 22:02

## 2025-05-31 RX ADMIN — LIDOCAINE HYDROCHLORIDE 2 PATCH: 20 JELLY TOPICAL at 20:00

## 2025-05-31 RX ADMIN — ATORVASTATIN CALCIUM 80 MILLIGRAM(S): 80 TABLET, FILM COATED ORAL at 22:02

## 2025-05-31 RX ADMIN — SODIUM CHLORIDE 75 MILLILITER(S): 9 INJECTION, SOLUTION INTRAVENOUS at 16:04

## 2025-05-31 RX ADMIN — CLOPIDOGREL BISULFATE 75 MILLIGRAM(S): 75 TABLET, FILM COATED ORAL at 12:27

## 2025-05-31 RX ADMIN — METOPROLOL SUCCINATE 25 MILLIGRAM(S): 50 TABLET, EXTENDED RELEASE ORAL at 05:55

## 2025-05-31 RX ADMIN — SODIUM CHLORIDE 75 MILLILITER(S): 9 INJECTION, SOLUTION INTRAVENOUS at 01:08

## 2025-05-31 RX ADMIN — AMIODARONE HYDROCHLORIDE 200 MILLIGRAM(S): 50 INJECTION, SOLUTION INTRAVENOUS at 05:55

## 2025-05-31 RX ADMIN — LIDOCAINE HYDROCHLORIDE 2 PATCH: 20 JELLY TOPICAL at 18:57

## 2025-05-31 RX ADMIN — Medication 25 GRAM(S): at 05:56

## 2025-05-31 RX ADMIN — Medication 125 MICROGRAM(S): at 05:55

## 2025-06-01 LAB
ANION GAP SERPL CALC-SCNC: 17 MMOL/L — SIGNIFICANT CHANGE UP (ref 5–17)
APTT BLD: 100.4 SEC — HIGH (ref 26.1–36.8)
APTT BLD: 143.6 SEC — CRITICAL HIGH (ref 26.1–36.8)
APTT BLD: 62 SEC — HIGH (ref 26.1–36.8)
BASOPHILS # BLD AUTO: 0.07 K/UL — SIGNIFICANT CHANGE UP (ref 0–0.2)
BASOPHILS NFR BLD AUTO: 0.4 % — SIGNIFICANT CHANGE UP (ref 0–2)
BUN SERPL-MCNC: 16.2 MG/DL — SIGNIFICANT CHANGE UP (ref 8–20)
CALCIUM SERPL-MCNC: 8.1 MG/DL — LOW (ref 8.4–10.5)
CHLORIDE SERPL-SCNC: 96 MMOL/L — SIGNIFICANT CHANGE UP (ref 96–108)
CO2 SERPL-SCNC: 21 MMOL/L — LOW (ref 22–29)
CREAT SERPL-MCNC: 1.31 MG/DL — HIGH (ref 0.5–1.3)
EGFR: 39 ML/MIN/1.73M2 — LOW
EGFR: 39 ML/MIN/1.73M2 — LOW
EOSINOPHIL # BLD AUTO: 1.17 K/UL — HIGH (ref 0–0.5)
EOSINOPHIL NFR BLD AUTO: 6.4 % — HIGH (ref 0–6)
GLUCOSE SERPL-MCNC: 78 MG/DL — SIGNIFICANT CHANGE UP (ref 70–99)
HCT VFR BLD CALC: 32.6 % — LOW (ref 34.5–45)
HGB BLD-MCNC: 9.9 G/DL — LOW (ref 11.5–15.5)
IMM GRANULOCYTES # BLD AUTO: 0.25 K/UL — HIGH (ref 0–0.07)
IMM GRANULOCYTES NFR BLD AUTO: 1.4 % — HIGH (ref 0–0.9)
LYMPHOCYTES # BLD AUTO: 1.71 K/UL — SIGNIFICANT CHANGE UP (ref 1–3.3)
LYMPHOCYTES NFR BLD AUTO: 9.4 % — LOW (ref 13–44)
MAGNESIUM SERPL-MCNC: 2.1 MG/DL — SIGNIFICANT CHANGE UP (ref 1.6–2.6)
MCHC RBC-ENTMCNC: 30.3 PG — SIGNIFICANT CHANGE UP (ref 27–34)
MCHC RBC-ENTMCNC: 30.4 G/DL — LOW (ref 32–36)
MCV RBC AUTO: 99.7 FL — SIGNIFICANT CHANGE UP (ref 80–100)
MONOCYTES # BLD AUTO: 1.57 K/UL — HIGH (ref 0–0.9)
MONOCYTES NFR BLD AUTO: 8.6 % — SIGNIFICANT CHANGE UP (ref 2–14)
NEUTROPHILS # BLD AUTO: 13.5 K/UL — HIGH (ref 1.8–7.4)
NEUTROPHILS NFR BLD AUTO: 73.8 % — SIGNIFICANT CHANGE UP (ref 43–77)
NRBC # BLD AUTO: 0 K/UL — SIGNIFICANT CHANGE UP (ref 0–0)
NRBC # FLD: 0 K/UL — SIGNIFICANT CHANGE UP (ref 0–0)
NRBC BLD AUTO-RTO: 0 /100 WBCS — SIGNIFICANT CHANGE UP (ref 0–0)
PHOSPHATE SERPL-MCNC: 3.6 MG/DL — SIGNIFICANT CHANGE UP (ref 2.4–4.7)
PLATELET # BLD AUTO: 274 K/UL — SIGNIFICANT CHANGE UP (ref 150–400)
PMV BLD: 11.8 FL — SIGNIFICANT CHANGE UP (ref 7–13)
POTASSIUM SERPL-MCNC: 4.1 MMOL/L — SIGNIFICANT CHANGE UP (ref 3.5–5.3)
POTASSIUM SERPL-SCNC: 4.1 MMOL/L — SIGNIFICANT CHANGE UP (ref 3.5–5.3)
RBC # BLD: 3.27 M/UL — LOW (ref 3.8–5.2)
RBC # FLD: 13.4 % — SIGNIFICANT CHANGE UP (ref 10.3–14.5)
SODIUM SERPL-SCNC: 134 MMOL/L — LOW (ref 135–145)
WBC # BLD: 18.27 K/UL — HIGH (ref 3.8–10.5)
WBC # FLD AUTO: 18.27 K/UL — HIGH (ref 3.8–10.5)

## 2025-06-01 PROCEDURE — 99231 SBSQ HOSP IP/OBS SF/LOW 25: CPT

## 2025-06-01 RX ORDER — TIOTROPIUM BROMIDE INHALATION SPRAY 3.12 UG/1
2 SPRAY, METERED RESPIRATORY (INHALATION) DAILY
Refills: 0 | Status: DISCONTINUED | OUTPATIENT
Start: 2025-06-01 | End: 2025-06-03

## 2025-06-01 RX ORDER — ALBUTEROL SULFATE 2.5 MG/3ML
1.25 VIAL, NEBULIZER (ML) INHALATION
Refills: 0 | Status: DISCONTINUED | OUTPATIENT
Start: 2025-06-01 | End: 2025-06-03

## 2025-06-01 RX ADMIN — HEPARIN SODIUM 500 UNIT(S)/HR: 1000 INJECTION INTRAVENOUS; SUBCUTANEOUS at 19:28

## 2025-06-01 RX ADMIN — HEPARIN SODIUM 600 UNIT(S)/HR: 1000 INJECTION INTRAVENOUS; SUBCUTANEOUS at 09:11

## 2025-06-01 RX ADMIN — Medication 25 GRAM(S): at 05:20

## 2025-06-01 RX ADMIN — HEPARIN SODIUM 500 UNIT(S)/HR: 1000 INJECTION INTRAVENOUS; SUBCUTANEOUS at 15:50

## 2025-06-01 RX ADMIN — METOPROLOL SUCCINATE 25 MILLIGRAM(S): 50 TABLET, EXTENDED RELEASE ORAL at 17:12

## 2025-06-01 RX ADMIN — Medication 975 MILLIGRAM(S): at 13:11

## 2025-06-01 RX ADMIN — METOPROLOL SUCCINATE 25 MILLIGRAM(S): 50 TABLET, EXTENDED RELEASE ORAL at 05:21

## 2025-06-01 RX ADMIN — Medication 975 MILLIGRAM(S): at 18:12

## 2025-06-01 RX ADMIN — Medication 975 MILLIGRAM(S): at 21:35

## 2025-06-01 RX ADMIN — Medication 1 PUFF(S): at 20:32

## 2025-06-01 RX ADMIN — AMIODARONE HYDROCHLORIDE 200 MILLIGRAM(S): 50 INJECTION, SOLUTION INTRAVENOUS at 05:21

## 2025-06-01 RX ADMIN — SODIUM CHLORIDE 75 MILLILITER(S): 9 INJECTION, SOLUTION INTRAVENOUS at 21:35

## 2025-06-01 RX ADMIN — Medication 975 MILLIGRAM(S): at 14:11

## 2025-06-01 RX ADMIN — LIDOCAINE HYDROCHLORIDE 2 PATCH: 20 JELLY TOPICAL at 06:26

## 2025-06-01 RX ADMIN — Medication 25 GRAM(S): at 21:35

## 2025-06-01 RX ADMIN — Medication 975 MILLIGRAM(S): at 05:21

## 2025-06-01 RX ADMIN — HEPARIN SODIUM 0 UNIT(S)/HR: 1000 INJECTION INTRAVENOUS; SUBCUTANEOUS at 08:02

## 2025-06-01 RX ADMIN — Medication 25 GRAM(S): at 13:10

## 2025-06-01 RX ADMIN — AMLODIPINE BESYLATE 5 MILLIGRAM(S): 10 TABLET ORAL at 05:21

## 2025-06-01 RX ADMIN — LIDOCAINE HYDROCHLORIDE 2 PATCH: 20 JELLY TOPICAL at 20:00

## 2025-06-01 RX ADMIN — ATORVASTATIN CALCIUM 80 MILLIGRAM(S): 80 TABLET, FILM COATED ORAL at 21:35

## 2025-06-01 RX ADMIN — SODIUM CHLORIDE 75 MILLILITER(S): 9 INJECTION, SOLUTION INTRAVENOUS at 05:24

## 2025-06-01 RX ADMIN — CLOPIDOGREL BISULFATE 75 MILLIGRAM(S): 75 TABLET, FILM COATED ORAL at 13:11

## 2025-06-01 RX ADMIN — HEPARIN SODIUM 800 UNIT(S)/HR: 1000 INJECTION INTRAVENOUS; SUBCUTANEOUS at 04:01

## 2025-06-01 RX ADMIN — Medication 125 MICROGRAM(S): at 04:03

## 2025-06-01 RX ADMIN — Medication 975 MILLIGRAM(S): at 17:12

## 2025-06-01 NOTE — PROGRESS NOTE ADULT - ASSESSMENT
A: 89y Female with PMHX htn, hypothyroidism, CAD. afib, recent stents placed (1.5 months ago), on eliquis and plavix (last dose eliquis 5/28), on life vest admitted with perforated appendicitis, being managed conservatively    P:  Hep gtt and plavix de to hx of stent  Pain control  Anti-emetics PRN  OOB as tolerated  Encourage IS  Tolerating CLD  AM labs/ replete lytes as needed

## 2025-06-02 ENCOUNTER — TRANSCRIPTION ENCOUNTER (OUTPATIENT)
Age: 89
End: 2025-06-02

## 2025-06-02 LAB
ANION GAP SERPL CALC-SCNC: 15 MMOL/L — SIGNIFICANT CHANGE UP (ref 5–17)
APTT BLD: 143.6 SEC — CRITICAL HIGH (ref 26.1–36.8)
APTT BLD: 32.9 SEC — SIGNIFICANT CHANGE UP (ref 26.1–36.8)
APTT BLD: 45.9 SEC — HIGH (ref 26.1–36.8)
BASOPHILS # BLD AUTO: 0.08 K/UL — SIGNIFICANT CHANGE UP (ref 0–0.2)
BASOPHILS NFR BLD AUTO: 0.5 % — SIGNIFICANT CHANGE UP (ref 0–2)
BUN SERPL-MCNC: 15.3 MG/DL — SIGNIFICANT CHANGE UP (ref 8–20)
CALCIUM SERPL-MCNC: 8 MG/DL — LOW (ref 8.4–10.5)
CHLORIDE SERPL-SCNC: 100 MMOL/L — SIGNIFICANT CHANGE UP (ref 96–108)
CO2 SERPL-SCNC: 22 MMOL/L — SIGNIFICANT CHANGE UP (ref 22–29)
CREAT SERPL-MCNC: 1.41 MG/DL — HIGH (ref 0.5–1.3)
EGFR: 35 ML/MIN/1.73M2 — LOW
EGFR: 35 ML/MIN/1.73M2 — LOW
EOSINOPHIL # BLD AUTO: 0.96 K/UL — HIGH (ref 0–0.5)
EOSINOPHIL NFR BLD AUTO: 6.1 % — HIGH (ref 0–6)
GLUCOSE SERPL-MCNC: 88 MG/DL — SIGNIFICANT CHANGE UP (ref 70–99)
HCT VFR BLD CALC: 31.8 % — LOW (ref 34.5–45)
HGB BLD-MCNC: 9.5 G/DL — LOW (ref 11.5–15.5)
IMM GRANULOCYTES # BLD AUTO: 0.39 K/UL — HIGH (ref 0–0.07)
IMM GRANULOCYTES NFR BLD AUTO: 2.5 % — HIGH (ref 0–0.9)
LYMPHOCYTES # BLD AUTO: 2.07 K/UL — SIGNIFICANT CHANGE UP (ref 1–3.3)
LYMPHOCYTES NFR BLD AUTO: 13.3 % — SIGNIFICANT CHANGE UP (ref 13–44)
MAGNESIUM SERPL-MCNC: 2 MG/DL — SIGNIFICANT CHANGE UP (ref 1.6–2.6)
MCHC RBC-ENTMCNC: 29.9 G/DL — LOW (ref 32–36)
MCHC RBC-ENTMCNC: 30.1 PG — SIGNIFICANT CHANGE UP (ref 27–34)
MCV RBC AUTO: 100.6 FL — HIGH (ref 80–100)
MONOCYTES # BLD AUTO: 1.5 K/UL — HIGH (ref 0–0.9)
MONOCYTES NFR BLD AUTO: 9.6 % — SIGNIFICANT CHANGE UP (ref 2–14)
N GONORRHOEA RRNA SPEC QL NAA+PROBE: SIGNIFICANT CHANGE UP
NEUTROPHILS # BLD AUTO: 10.62 K/UL — HIGH (ref 1.8–7.4)
NEUTROPHILS NFR BLD AUTO: 68 % — SIGNIFICANT CHANGE UP (ref 43–77)
NRBC # BLD AUTO: 0.02 K/UL — HIGH (ref 0–0)
NRBC # FLD: 0.02 K/UL — HIGH (ref 0–0)
NRBC BLD AUTO-RTO: 0 /100 WBCS — SIGNIFICANT CHANGE UP (ref 0–0)
PHOSPHATE SERPL-MCNC: 3.5 MG/DL — SIGNIFICANT CHANGE UP (ref 2.4–4.7)
PLATELET # BLD AUTO: 262 K/UL — SIGNIFICANT CHANGE UP (ref 150–400)
PMV BLD: 11.2 FL — SIGNIFICANT CHANGE UP (ref 7–13)
POTASSIUM SERPL-MCNC: 3.7 MMOL/L — SIGNIFICANT CHANGE UP (ref 3.5–5.3)
POTASSIUM SERPL-SCNC: 3.7 MMOL/L — SIGNIFICANT CHANGE UP (ref 3.5–5.3)
RBC # BLD: 3.16 M/UL — LOW (ref 3.8–5.2)
RBC # FLD: 13.4 % — SIGNIFICANT CHANGE UP (ref 10.3–14.5)
SODIUM SERPL-SCNC: 137 MMOL/L — SIGNIFICANT CHANGE UP (ref 135–145)
SPECIMEN SOURCE: SIGNIFICANT CHANGE UP
WBC # BLD: 15.62 K/UL — HIGH (ref 3.8–10.5)
WBC # FLD AUTO: 15.62 K/UL — HIGH (ref 3.8–10.5)

## 2025-06-02 PROCEDURE — 99232 SBSQ HOSP IP/OBS MODERATE 35: CPT

## 2025-06-02 RX ORDER — AMLODIPINE BESYLATE 10 MG/1
5 TABLET ORAL DAILY
Refills: 0 | Status: DISCONTINUED | OUTPATIENT
Start: 2025-06-02 | End: 2025-06-03

## 2025-06-02 RX ORDER — METOPROLOL SUCCINATE 50 MG/1
25 TABLET, EXTENDED RELEASE ORAL
Refills: 0 | Status: DISCONTINUED | OUTPATIENT
Start: 2025-06-02 | End: 2025-06-03

## 2025-06-02 RX ORDER — AMOXICILLIN AND CLAVULANATE POTASSIUM 500; 125 MG/1; MG/1
1 TABLET, FILM COATED ORAL
Refills: 0 | Status: DISCONTINUED | OUTPATIENT
Start: 2025-06-02 | End: 2025-06-03

## 2025-06-02 RX ORDER — ONDANSETRON HCL/PF 4 MG/2 ML
4 VIAL (ML) INJECTION ONCE
Refills: 0 | Status: COMPLETED | OUTPATIENT
Start: 2025-06-02 | End: 2025-06-02

## 2025-06-02 RX ADMIN — Medication 975 MILLIGRAM(S): at 05:52

## 2025-06-02 RX ADMIN — HEPARIN SODIUM 600 UNIT(S)/HR: 1000 INJECTION INTRAVENOUS; SUBCUTANEOUS at 07:49

## 2025-06-02 RX ADMIN — Medication 1.25 MILLIGRAM(S): at 08:57

## 2025-06-02 RX ADMIN — METOPROLOL SUCCINATE 25 MILLIGRAM(S): 50 TABLET, EXTENDED RELEASE ORAL at 17:36

## 2025-06-02 RX ADMIN — Medication 25 GRAM(S): at 05:51

## 2025-06-02 RX ADMIN — AMLODIPINE BESYLATE 5 MILLIGRAM(S): 10 TABLET ORAL at 05:52

## 2025-06-02 RX ADMIN — ATORVASTATIN CALCIUM 80 MILLIGRAM(S): 80 TABLET, FILM COATED ORAL at 21:17

## 2025-06-02 RX ADMIN — Medication 1 DOSE(S): at 21:06

## 2025-06-02 RX ADMIN — Medication 975 MILLIGRAM(S): at 21:17

## 2025-06-02 RX ADMIN — HEPARIN SODIUM 600 UNIT(S)/HR: 1000 INJECTION INTRAVENOUS; SUBCUTANEOUS at 06:59

## 2025-06-02 RX ADMIN — SODIUM CHLORIDE 75 MILLILITER(S): 9 INJECTION, SOLUTION INTRAVENOUS at 06:40

## 2025-06-02 RX ADMIN — HEPARIN SODIUM 5000 UNIT(S): 1000 INJECTION INTRAVENOUS; SUBCUTANEOUS at 23:14

## 2025-06-02 RX ADMIN — HEPARIN SODIUM 0 UNIT(S)/HR: 1000 INJECTION INTRAVENOUS; SUBCUTANEOUS at 14:33

## 2025-06-02 RX ADMIN — AMOXICILLIN AND CLAVULANATE POTASSIUM 1 TABLET(S): 500; 125 TABLET, FILM COATED ORAL at 17:36

## 2025-06-02 RX ADMIN — Medication 125 MICROGRAM(S): at 03:48

## 2025-06-02 RX ADMIN — Medication 975 MILLIGRAM(S): at 12:50

## 2025-06-02 RX ADMIN — HEPARIN SODIUM 2500 UNIT(S): 1000 INJECTION INTRAVENOUS; SUBCUTANEOUS at 07:00

## 2025-06-02 RX ADMIN — HEPARIN SODIUM 500 UNIT(S)/HR: 1000 INJECTION INTRAVENOUS; SUBCUTANEOUS at 00:08

## 2025-06-02 RX ADMIN — Medication 1.25 MILLIGRAM(S): at 21:12

## 2025-06-02 RX ADMIN — Medication 1 DOSE(S): at 09:03

## 2025-06-02 RX ADMIN — Medication 20 MILLIEQUIVALENT(S): at 11:50

## 2025-06-02 RX ADMIN — HEPARIN SODIUM 400 UNIT(S)/HR: 1000 INJECTION INTRAVENOUS; SUBCUTANEOUS at 19:39

## 2025-06-02 RX ADMIN — CLOPIDOGREL BISULFATE 75 MILLIGRAM(S): 75 TABLET, FILM COATED ORAL at 11:50

## 2025-06-02 RX ADMIN — HEPARIN SODIUM 700 UNIT(S)/HR: 1000 INJECTION INTRAVENOUS; SUBCUTANEOUS at 23:12

## 2025-06-02 RX ADMIN — LIDOCAINE HYDROCHLORIDE 2 PATCH: 20 JELLY TOPICAL at 17:41

## 2025-06-02 RX ADMIN — Medication 975 MILLIGRAM(S): at 11:50

## 2025-06-02 RX ADMIN — HEPARIN SODIUM 400 UNIT(S)/HR: 1000 INJECTION INTRAVENOUS; SUBCUTANEOUS at 15:41

## 2025-06-02 RX ADMIN — AMOXICILLIN AND CLAVULANATE POTASSIUM 1 TABLET(S): 500; 125 TABLET, FILM COATED ORAL at 09:35

## 2025-06-02 RX ADMIN — METOPROLOL SUCCINATE 25 MILLIGRAM(S): 50 TABLET, EXTENDED RELEASE ORAL at 05:52

## 2025-06-02 RX ADMIN — TIOTROPIUM BROMIDE INHALATION SPRAY 2 PUFF(S): 3.12 SPRAY, METERED RESPIRATORY (INHALATION) at 09:03

## 2025-06-02 RX ADMIN — Medication 4 MILLIGRAM(S): at 16:27

## 2025-06-02 RX ADMIN — AMIODARONE HYDROCHLORIDE 200 MILLIGRAM(S): 50 INJECTION, SOLUTION INTRAVENOUS at 05:52

## 2025-06-02 NOTE — DISCHARGE NOTE PROVIDER - HOSPITAL COURSE
89y Female PMHX htn, hypothyroidism, CAD. afib , Pt had stents place d1.5 mos ago  ( on eliquis and plavix last taken yest) and is wearing a life vest. presented of 1 day of abd pain associated with nausea ,   Patient denies fevers/chills, denies lightheadedness/dizziness, denies SOB/chest pain, denies nausea/vomiting, denies constipation/diarrhea.    last colonoscopy was more than 15 years ago     Hospital course:  Patient admitted to the ACS service for non-operative management of perforated appendicitis. Patient was started on IV antibiotics with improvement of symptoms and slow downtrend of WBC. PAtient evaluated by IR without a drainable window for the abscess. Patient continued with IV abx with good response, was started on diet with good PO tolerance and no increase of pain. Had work-up for ovarian abscess with TV-US with resolution of abscess and negative STD panel. Patient was now transitioned with PO antibiotics with good clinical response and is stable for discharge home on PO abx. H&P: 89y Female PMHX htn, hypothyroidism, CAD. afib , Pt had stents place d1.5 mos ago  ( on eliquis and plavix last taken yest) and is wearing a life vest. presented of 1 day of abd pain associated with nausea ,   Patient denies fevers/chills, denies lightheadedness/dizziness, denies SOB/chest pain, denies nausea/vomiting, denies constipation/diarrhea.    last colonoscopy was more than 15 years ago     Hospital course:  Patient admitted to the ACS service for non-operative management of perforated appendicitis. Patient was started on IV antibiotics with improvement of symptoms and slow downtrend of WBC. Patient evaluated by IR without a drainable window for the abscess. Patient continued with IV abx with good response, was started on diet with good PO tolerance and no increase of pain. Had work-up for ovarian abscess with TV-US with resolution of abscess and negative STD panel. Patient was now transitioned with PO antibiotics with good clinical response and is stable for discharge home on PO abx.

## 2025-06-02 NOTE — DISCHARGE NOTE PROVIDER - NSDCMRMEDTOKEN_GEN_ALL_CORE_FT
Advair Diskus 250 mcg-50 mcg/inh inhalation powder: 1 inhaled 2 times a day  amiodarone 200 mg oral tablet: 1 tab(s) orally 3 times a day Last done 4/23/25  amiodarone 200 mg oral tablet: 1 tab(s) orally once a day Start date 4/24/25 after completing 200mg TID  amLODIPine 5 mg oral tablet: 1 tab(s) orally once a day  Aspirin EC 81 mg oral delayed release tablet: 1 tab(s) orally once a day  Eliquis 5 mg oral tablet: 1 tab(s) orally 2 times a day  levothyroxine 125 mcg (0.125 mg) oral tablet: 1 tab(s) orally once a day  Lipitor 80 mg oral tablet: 1 tab(s) orally once a day (at bedtime)  Metoprolol Tartrate 25 mg oral tablet: 1 tab(s) orally 2 times a day  Plavix 75 mg oral tablet: 1 tab(s) orally once a day  Spiriva 18 mcg inhalation capsule: 1 cap(s) inhaled once a day   acetaminophen 325 mg oral tablet: 3 tab(s) orally every 6 hours  Advair Diskus 250 mcg-50 mcg/inh inhalation powder: 1 inhaled 2 times a day  amiodarone 200 mg oral tablet: 1 tab(s) orally 3 times a day Last done 4/23/25  amiodarone 200 mg oral tablet: 1 tab(s) orally once a day Start date 4/24/25 after completing 200mg TID  amLODIPine 5 mg oral tablet: 1 tab(s) orally once a day  Aspirin EC 81 mg oral delayed release tablet: 1 tab(s) orally once a day  Eliquis 5 mg oral tablet: 1 tab(s) orally 2 times a day  levothyroxine 125 mcg (0.125 mg) oral tablet: 1 tab(s) orally once a day  lidocaine 4% topical film: Apply topically to affected area once a day  Lipitor 80 mg oral tablet: 1 tab(s) orally once a day (at bedtime)  Metoprolol Tartrate 25 mg oral tablet: 1 tab(s) orally 2 times a day  Plavix 75 mg oral tablet: 1 tab(s) orally once a day  Spiriva 18 mcg inhalation capsule: 1 cap(s) inhaled once a day   acetaminophen 325 mg oral tablet: 3 tab(s) orally every 6 hours  Advair Diskus 250 mcg-50 mcg/inh inhalation powder: 1 inhaled 2 times a day  amiodarone 200 mg oral tablet: 1 tab(s) orally 3 times a day Last done 4/23/25  amiodarone 200 mg oral tablet: 1 tab(s) orally once a day Start date 4/24/25 after completing 200mg TID  amLODIPine 5 mg oral tablet: 1 tab(s) orally once a day  amoxicillin-clavulanate 875 mg-125 mg oral tablet: 1 tab(s) orally 2 times a day  Aspirin EC 81 mg oral delayed release tablet: 1 tab(s) orally once a day  Eliquis 5 mg oral tablet: 1 tab(s) orally 2 times a day  levothyroxine 125 mcg (0.125 mg) oral tablet: 1 tab(s) orally once a day  lidocaine 4% topical film: Apply topically to affected area once a day  Lipitor 80 mg oral tablet: 1 tab(s) orally once a day (at bedtime)  Metoprolol Tartrate 25 mg oral tablet: 1 tab(s) orally 2 times a day  Plavix 75 mg oral tablet: 1 tab(s) orally once a day  Spiriva 18 mcg inhalation capsule: 1 cap(s) inhaled once a day

## 2025-06-02 NOTE — DIETITIAN INITIAL EVALUATION ADULT - ADD RECOMMEND
1. Continue Regular diet as tolerated  2. Encourage intake with meals  3. Consider ondansetron prn if not contraindicated   4. Pt may benefit from probiotic while on abx, c/o soft stools   5. Monitor weight trends, PO intake, labs

## 2025-06-02 NOTE — DISCHARGE NOTE PROVIDER - NSFOLLOWUPCLINICS_GEN_ALL_ED_FT
[facility] Acute Care Surgery  Acute Care Surgery  [address] [place] [id number]  Phone: [phone]  Fax:   Follow Up Time: 2 weeks

## 2025-06-02 NOTE — DIETITIAN INITIAL EVALUATION ADULT - OTHER INFO
89y Female PMHX htn, hypothyroidism, CAD. afib , Pt had stents place d1.5 mos ago  ( on eliquis and plavix last taken yest) and is wearing a life vest. presented of 1 day of abd pain associated with nausea. Pt with perforated appendicitis. Taking conservative approach with no IR intervention.

## 2025-06-02 NOTE — DISCHARGE NOTE PROVIDER - NSDCCPCAREPLAN_GEN_ALL_CORE_FT
PRINCIPAL DISCHARGE DIAGNOSIS  Diagnosis: Perforated appendicitis  Assessment and Plan of Treatment: Follow up: Please call and make an appointment with the Acute Care Surgery Clinic 10-14 days after discharge. Also, please call and make an appointment with your primary care physician as per your usual schedule.   Activity: May return to normal activities   Diet: May continue same regular diet as prior to admission.  Medications: Please resume all home medications as prior to admission.  You are encouraged to take over-the-counter tylenol and/or ibuprofen for pain relief as directed when needed. You have been prescribed an antibiotic.  Take as directed and YOU MUST FINISH ALL PILLS PRESCRIBED even if you are feeling better.   Patient is advised to RETURN TO THE EMERGENCY DEPARTMENT for any of the following - worsening pain, fever/chills, nausea/vomiting, altered mental status, chest pain, shortness of breath, or any other new / worsening symptom.

## 2025-06-02 NOTE — DIETITIAN INITIAL EVALUATION ADULT - PERTINENT MEDS FT
MEDICATIONS  (STANDING):  amoxicillin  875 milliGRAM(s)/clavulanate 1 Tablet(s) Oral two times a day

## 2025-06-02 NOTE — DIETITIAN INITIAL EVALUATION ADULT - PERTINENT LABORATORY DATA
06-02    137  |  100  |  15.3  ----------------------------<  88  3.7   |  22.0  |  1.41[H]    Ca    8.0[L]      02 Jun 2025 06:05

## 2025-06-02 NOTE — DIETITIAN INITIAL EVALUATION ADULT - ORAL INTAKE PTA/DIET HISTORY
Pt visited at bedside. Reports having fair appetite but does not like the food provided in-house. Pt cooks her own food at home and typically has a good appetite. Pt endorses -140lbs. Current weight 137lbs. Denies recent unintentional weight loss. Pt endorses nausea after taking big pills and having multiple "soft" BMs. Denies h/o C/D at home. No muscle/fat loss noted per physical exam.

## 2025-06-02 NOTE — PROGRESS NOTE ADULT - NS ATTEND AMEND GEN_ALL_CORE FT
89yo Female with recent history of vfib cardiac arrest with life vest and CAD s/p PCI (6week ago) on plavix and afib on eliquis, HTN, hypothyroidism was admitted for perforated appendicitis. CT also showed possible ovarian abscess and sigmoid diverticulitis. IR was consulted yesterday and no drainage necessary due to small collection. Currently pt abdominal pain improved. Denied F/C/N/V. Making adequate urine and having bowel movement. On exam, pt is hemodynamically stable and nontoxic appearance. Abdomen soft, nondistended, tenderness at RLQ. Lab showed persistent leukocytosis of 22, Cr slight increased to 1.42 (?baseline around 1.35-1.6).       -adv CLD  -sent urine lyte, will increase IVF to 75, will continue trend Cr since pt making good urine  -Cardiology consult appreciated, will continue hep drip as high Chadsvsc of 6, will continue plavix due to high risk of cardiac stent thrombosis, and recent cardiac arrest on life vest  -continue zosyn and trend wbc  -will obtain pelvic us to evaluate ovarian abscess, will sent STD screening   -continue home meds  -if pt has persistent leukocytosis, will need repeat CT A/p  -SCD/IS/DVT ppx  -OOBTC
Above assessment noted.  The patient was seen and examined by myself with the surgical PA.  The patient was admitted with perforated appendicitis with abscess.  The patient states she is feeling less pain today and has no nausea or vomit. Abdomen is soft with mild RLQ tenderness, no guarding, no rebound.  Will continue with IV abx's as abscess is not amenable to IR drainage.  Case discussed with the patient's daughter.  The transvaginal ultrasound revealed the collection is not related to the ovary. A total of 10 minutes was spent coordinating the patient's care.
Patient seen and examined at bedside. No acute events overnight. Denies any nausea or vomiting. Pain is well controlled. Afebrile. Tolerating diet. Abdomen is soft, non distended, non tender to palpation. Reports bowel movements and passing flatus    Continue diet as tolerated  Will switch to PO Abx  WBC trending down, continue to trend  monitor for fevers  Continue heparin gtt  Will consider CT scan a/p if WBC trends up tomorrow
Above assessment noted.  The patient was seen and examined by myself with the surgical PA.  The patient was admitted with perforated appendicitis with abscess.  The patient is without abdominal pain this morning. She has no nausea or vomit.  Abdomen is soft and no right lower abdominal tenderness, no guarding, no rebound.  The WBC's is trending down. Continue with antibiotics.  Recheck WBC's in AM.

## 2025-06-03 ENCOUNTER — TRANSCRIPTION ENCOUNTER (OUTPATIENT)
Age: 89
End: 2025-06-03

## 2025-06-03 VITALS — OXYGEN SATURATION: 100 %

## 2025-06-03 LAB
ANION GAP SERPL CALC-SCNC: 13 MMOL/L — SIGNIFICANT CHANGE UP (ref 5–17)
APTT BLD: 42 SEC — HIGH (ref 26.1–36.8)
APTT BLD: >200 SEC — CRITICAL HIGH (ref 26.1–36.8)
BASOPHILS # BLD AUTO: 0.05 K/UL — SIGNIFICANT CHANGE UP (ref 0–0.2)
BASOPHILS NFR BLD AUTO: 0.4 % — SIGNIFICANT CHANGE UP (ref 0–2)
BUN SERPL-MCNC: 13.1 MG/DL — SIGNIFICANT CHANGE UP (ref 8–20)
CALCIUM SERPL-MCNC: 8.3 MG/DL — LOW (ref 8.4–10.5)
CHLORIDE SERPL-SCNC: 102 MMOL/L — SIGNIFICANT CHANGE UP (ref 96–108)
CO2 SERPL-SCNC: 23 MMOL/L — SIGNIFICANT CHANGE UP (ref 22–29)
CREAT SERPL-MCNC: 1.25 MG/DL — SIGNIFICANT CHANGE UP (ref 0.5–1.3)
CULTURE RESULTS: SIGNIFICANT CHANGE UP
EGFR: 41 ML/MIN/1.73M2 — LOW
EGFR: 41 ML/MIN/1.73M2 — LOW
EOSINOPHIL # BLD AUTO: 1.15 K/UL — HIGH (ref 0–0.5)
EOSINOPHIL NFR BLD AUTO: 9.8 % — HIGH (ref 0–6)
GLUCOSE SERPL-MCNC: 85 MG/DL — SIGNIFICANT CHANGE UP (ref 70–99)
HCT VFR BLD CALC: 31.9 % — LOW (ref 34.5–45)
HGB BLD-MCNC: 9.6 G/DL — LOW (ref 11.5–15.5)
IMM GRANULOCYTES # BLD AUTO: 0.31 K/UL — HIGH (ref 0–0.07)
IMM GRANULOCYTES NFR BLD AUTO: 2.6 % — HIGH (ref 0–0.9)
LYMPHOCYTES # BLD AUTO: 0.95 K/UL — LOW (ref 1–3.3)
LYMPHOCYTES NFR BLD AUTO: 8.1 % — LOW (ref 13–44)
MAGNESIUM SERPL-MCNC: 1.9 MG/DL — SIGNIFICANT CHANGE UP (ref 1.6–2.6)
MCHC RBC-ENTMCNC: 30.1 G/DL — LOW (ref 32–36)
MCHC RBC-ENTMCNC: 30.3 PG — SIGNIFICANT CHANGE UP (ref 27–34)
MCV RBC AUTO: 100.6 FL — HIGH (ref 80–100)
MONOCYTES # BLD AUTO: 1.18 K/UL — HIGH (ref 0–0.9)
MONOCYTES NFR BLD AUTO: 10 % — SIGNIFICANT CHANGE UP (ref 2–14)
NEUTROPHILS # BLD AUTO: 8.13 K/UL — HIGH (ref 1.8–7.4)
NEUTROPHILS NFR BLD AUTO: 69.1 % — SIGNIFICANT CHANGE UP (ref 43–77)
NRBC # BLD AUTO: 0.03 K/UL — HIGH (ref 0–0)
NRBC # FLD: 0.03 K/UL — HIGH (ref 0–0)
NRBC BLD AUTO-RTO: 0 /100 WBCS — SIGNIFICANT CHANGE UP (ref 0–0)
PHOSPHATE SERPL-MCNC: 3 MG/DL — SIGNIFICANT CHANGE UP (ref 2.4–4.7)
PLATELET # BLD AUTO: 267 K/UL — SIGNIFICANT CHANGE UP (ref 150–400)
PMV BLD: 11.2 FL — SIGNIFICANT CHANGE UP (ref 7–13)
POTASSIUM SERPL-MCNC: 4 MMOL/L — SIGNIFICANT CHANGE UP (ref 3.5–5.3)
POTASSIUM SERPL-SCNC: 4 MMOL/L — SIGNIFICANT CHANGE UP (ref 3.5–5.3)
RBC # BLD: 3.17 M/UL — LOW (ref 3.8–5.2)
RBC # FLD: 13.6 % — SIGNIFICANT CHANGE UP (ref 10.3–14.5)
SODIUM SERPL-SCNC: 138 MMOL/L — SIGNIFICANT CHANGE UP (ref 135–145)
SPECIMEN SOURCE: SIGNIFICANT CHANGE UP
WBC # BLD: 11.77 K/UL — HIGH (ref 3.8–10.5)
WBC # FLD AUTO: 11.77 K/UL — HIGH (ref 3.8–10.5)

## 2025-06-03 PROCEDURE — 85730 THROMBOPLASTIN TIME PARTIAL: CPT

## 2025-06-03 PROCEDURE — 83690 ASSAY OF LIPASE: CPT

## 2025-06-03 PROCEDURE — 85025 COMPLETE CBC W/AUTO DIFF WBC: CPT

## 2025-06-03 PROCEDURE — 76856 US EXAM PELVIC COMPLETE: CPT

## 2025-06-03 PROCEDURE — 74177 CT ABD & PELVIS W/CONTRAST: CPT

## 2025-06-03 PROCEDURE — 82570 ASSAY OF URINE CREATININE: CPT

## 2025-06-03 PROCEDURE — 99285 EMERGENCY DEPT VISIT HI MDM: CPT | Mod: 25

## 2025-06-03 PROCEDURE — 82962 GLUCOSE BLOOD TEST: CPT

## 2025-06-03 PROCEDURE — 80048 BASIC METABOLIC PNL TOTAL CA: CPT

## 2025-06-03 PROCEDURE — 83605 ASSAY OF LACTIC ACID: CPT

## 2025-06-03 PROCEDURE — 87040 BLOOD CULTURE FOR BACTERIA: CPT

## 2025-06-03 PROCEDURE — C9460: CPT

## 2025-06-03 PROCEDURE — 96374 THER/PROPH/DIAG INJ IV PUSH: CPT

## 2025-06-03 PROCEDURE — 96375 TX/PRO/DX INJ NEW DRUG ADDON: CPT

## 2025-06-03 PROCEDURE — 94640 AIRWAY INHALATION TREATMENT: CPT

## 2025-06-03 PROCEDURE — 99232 SBSQ HOSP IP/OBS MODERATE 35: CPT

## 2025-06-03 PROCEDURE — 94760 N-INVAS EAR/PLS OXIMETRY 1: CPT

## 2025-06-03 PROCEDURE — 84100 ASSAY OF PHOSPHORUS: CPT

## 2025-06-03 PROCEDURE — 36415 COLL VENOUS BLD VENIPUNCTURE: CPT

## 2025-06-03 PROCEDURE — 93005 ELECTROCARDIOGRAM TRACING: CPT

## 2025-06-03 PROCEDURE — 85027 COMPLETE CBC AUTOMATED: CPT

## 2025-06-03 PROCEDURE — 80053 COMPREHEN METABOLIC PANEL: CPT

## 2025-06-03 PROCEDURE — 83735 ASSAY OF MAGNESIUM: CPT

## 2025-06-03 PROCEDURE — 81003 URINALYSIS AUTO W/O SCOPE: CPT

## 2025-06-03 PROCEDURE — 87591 N.GONORRHOEAE DNA AMP PROB: CPT

## 2025-06-03 PROCEDURE — 84300 ASSAY OF URINE SODIUM: CPT

## 2025-06-03 PROCEDURE — 85610 PROTHROMBIN TIME: CPT

## 2025-06-03 RX ORDER — AMOXICILLIN AND CLAVULANATE POTASSIUM 500; 125 MG/1; MG/1
1 TABLET, FILM COATED ORAL
Qty: 10 | Refills: 0
Start: 2025-06-03 | End: 2025-06-07

## 2025-06-03 RX ORDER — LIDOCAINE HYDROCHLORIDE 20 MG/ML
2 JELLY TOPICAL
Qty: 0 | Refills: 0 | DISCHARGE
Start: 2025-06-03

## 2025-06-03 RX ORDER — APIXABAN 2.5 MG/1
5 TABLET, FILM COATED ORAL EVERY 12 HOURS
Refills: 0 | Status: DISCONTINUED | OUTPATIENT
Start: 2025-06-03 | End: 2025-06-03

## 2025-06-03 RX ORDER — ACETAMINOPHEN 500 MG/5ML
3 LIQUID (ML) ORAL
Qty: 0 | Refills: 0 | DISCHARGE
Start: 2025-06-03

## 2025-06-03 RX ADMIN — Medication 1 DOSE(S): at 20:40

## 2025-06-03 RX ADMIN — Medication 1.25 MILLIGRAM(S): at 10:05

## 2025-06-03 RX ADMIN — Medication 125 MICROGRAM(S): at 05:07

## 2025-06-03 RX ADMIN — METOPROLOL SUCCINATE 25 MILLIGRAM(S): 50 TABLET, EXTENDED RELEASE ORAL at 06:00

## 2025-06-03 RX ADMIN — HEPARIN SODIUM 0 UNIT(S)/HR: 1000 INJECTION INTRAVENOUS; SUBCUTANEOUS at 06:23

## 2025-06-03 RX ADMIN — AMIODARONE HYDROCHLORIDE 200 MILLIGRAM(S): 50 INJECTION, SOLUTION INTRAVENOUS at 06:03

## 2025-06-03 RX ADMIN — AMOXICILLIN AND CLAVULANATE POTASSIUM 1 TABLET(S): 500; 125 TABLET, FILM COATED ORAL at 06:00

## 2025-06-03 RX ADMIN — HEPARIN SODIUM 500 UNIT(S)/HR: 1000 INJECTION INTRAVENOUS; SUBCUTANEOUS at 07:32

## 2025-06-03 RX ADMIN — CLOPIDOGREL BISULFATE 75 MILLIGRAM(S): 75 TABLET, FILM COATED ORAL at 11:59

## 2025-06-03 RX ADMIN — Medication 975 MILLIGRAM(S): at 18:13

## 2025-06-03 RX ADMIN — METOPROLOL SUCCINATE 25 MILLIGRAM(S): 50 TABLET, EXTENDED RELEASE ORAL at 18:13

## 2025-06-03 RX ADMIN — LIDOCAINE HYDROCHLORIDE 2 PATCH: 20 JELLY TOPICAL at 05:00

## 2025-06-03 RX ADMIN — AMOXICILLIN AND CLAVULANATE POTASSIUM 1 TABLET(S): 500; 125 TABLET, FILM COATED ORAL at 18:13

## 2025-06-03 RX ADMIN — Medication 1.25 MILLIGRAM(S): at 20:39

## 2025-06-03 RX ADMIN — AMLODIPINE BESYLATE 5 MILLIGRAM(S): 10 TABLET ORAL at 06:00

## 2025-06-03 RX ADMIN — Medication 975 MILLIGRAM(S): at 06:00

## 2025-06-03 RX ADMIN — Medication 975 MILLIGRAM(S): at 11:58

## 2025-06-03 RX ADMIN — APIXABAN 5 MILLIGRAM(S): 2.5 TABLET, FILM COATED ORAL at 11:58

## 2025-06-03 RX ADMIN — Medication 1 DOSE(S): at 10:05

## 2025-06-03 RX ADMIN — TIOTROPIUM BROMIDE INHALATION SPRAY 2 PUFF(S): 3.12 SPRAY, METERED RESPIRATORY (INHALATION) at 10:06

## 2025-06-03 NOTE — PROGRESS NOTE ADULT - ASSESSMENT
89y Female with PMHX htn, hypothyroidism, CAD. afib, recent stents placed (1.5 months ago), on eliquis and plavix (last dose eliquis 5/28), on life vest admitted with perforated appendicitis, being managed conservatively    P:  Continue Augmentin for total of 2 weeks   Pain control  Anti-emetics PRN  OOB as tolerated  Encourage IS  Tolerating Regular diet  PT : home PT   dispo today    89y Female with PMHX htn, hypothyroidism, CAD. afib, recent stents placed (1.5 months ago), on eliquis and plavix (last dose eliquis 5/28), on life vest admitted with perforated appendicitis, being managed conservatively    P:  Continue Augmentin for total of 10 days  Pain control  Anti-emetics PRN  OOB as tolerated  Encourage IS  Tolerating Regular diet  PT : home PT   dispo today

## 2025-06-03 NOTE — DISCHARGE NOTE NURSING/CASE MANAGEMENT/SOCIAL WORK - NSDCPEFALRISK_GEN_ALL_CORE
For information on Fall & Injury Prevention, visit: [email or web address] OR  [email or web address] OR  https://www.cdc.gov/steadi/patient.html

## 2025-06-03 NOTE — PHYSICAL THERAPY INITIAL EVALUATION ADULT - PERTINENT HX OF CURRENT PROBLEM, REHAB EVAL
Pt is 89y Female with PMHX htn, hypothyroidism, CAD. afib, recent stents placed (1.5 months ago), on eliquis and plavix (last dose eliquis 5/28), on life vest admitted with perforated appendicitis, being managed conservatively

## 2025-06-03 NOTE — PROGRESS NOTE ADULT - SUBJECTIVE AND OBJECTIVE BOX
Subjective:   Patient seen and examined. No acute events overnight. Tolerating diet and pain well controlled. Ambulating. Denies HA NV CP SOB dizziness          MEDICATIONS  (STANDING):  MEDICATIONS  (STANDING):  acetaminophen     Tablet .. 975 milliGRAM(s) Oral every 6 hours  albuterol   0.042% 1.25 milliGRAM(s) Nebulizer two times a day  aMIOdarone    Tablet 200 milliGRAM(s) Oral daily  amLODIPine   Tablet 5 milliGRAM(s) Oral daily  amoxicillin  875 milliGRAM(s)/clavulanate 1 Tablet(s) Oral two times a day  atorvastatin 80 milliGRAM(s) Oral at bedtime  clopidogrel Tablet 75 milliGRAM(s) Oral daily  fluticasone propionate/ salmeterol 100-50 MICROgram(s) Diskus 1 Dose(s) Inhalation two times a day  heparin  Infusion. 800 Unit(s)/Hr (8 mL/Hr) IV Continuous <Continuous>  levothyroxine 125 MICROGram(s) Oral daily  lidocaine   4% Patch 2 Patch Transdermal every 24 hours  metoprolol tartrate 25 milliGRAM(s) Oral two times a day  potassium chloride    Tablet ER 20 milliEquivalent(s) Oral once  tiotropium 2.5 MICROgram(s) Inhaler 2 Puff(s) Inhalation daily    MEDICATIONS  (PRN):  heparin   Injectable 5000 Unit(s) IV Push every 6 hours PRN For aPTT less than 40  heparin   Injectable 2500 Unit(s) IV Push every 6 hours PRN For aPTT between 40 - 57      Vital Signs Last 24 Hrs  ICU Vital Signs Last 24 Hrs  T(C): 36.7 (02 Jun 2025 08:26), Max: 36.9 (02 Jun 2025 04:37)  T(F): 98 (02 Jun 2025 08:26), Max: 98.5 (02 Jun 2025 04:37)  HR: 59 (02 Jun 2025 08:26) (59 - 68)  BP: 149/74 (02 Jun 2025 08:26) (120/56 - 152/71)  BP(mean): --  ABP: --  ABP(mean): --  RR: 18 (02 Jun 2025 08:26) (14 - 18)  SpO2: 95% (02 Jun 2025 08:26) (93% - 98%)    O2 Parameters below as of 02 Jun 2025 08:26  Patient On (Oxygen Delivery Method): room air              Physical Exam:    Constitutional: NAD  HEENT: PERRL, EOMI  Respiratory: Respirations non-labored, no accessory muscle use  Gastrointestinal: Soft, LLQ mildly tender, non-distended  Neurological: A&O x 3          LABS:                                            9.5                   Neurophils% (auto):   68.0   (06-02 @ 06:05):    15.62)-----------(262          Lymphocytes% (auto):  13.3                                          31.8                   Eosinphils% (auto):   6.1      Manual%: Neutrophils x    ; Lymphocytes x    ; Eosinophils x    ; Bands%: x    ; Blasts x          06-02    137  |  100  |  15.3  ----------------------------<  88  3.7   |  22.0  |  1.41[H]    Ca    8.0[L]      02 Jun 2025 06:05  Phos  3.5     06-02  Mg     2.0     06-02      ( 06-02 @ 06:05 )   PT: x    ;   INR: x      aPTT: 45.9 sec        RECENT CULTURES:  05-29 @ 01:52 Blood Blood-Venous     No growth at 72 Hours        
SUBJECTIVE/24 hour events: patient seen and examined on rounds this AM, resting comfortably in bed. She is  to the RLQ. She reports no pain at rest. Heparin drip supra therapeutic x 2 overnight.     Vital Signs Last 24 Hrs  T(C): 36.6 (30 May 2025 04:00), Max: 36.7 (29 May 2025 19:28)  T(F): 97.9 (30 May 2025 04:00), Max: 98 (29 May 2025 19:28)  HR: 63 (30 May 2025 04:00) (59 - 67)  BP: 132/73 (30 May 2025 04:00) (111/68 - 149/63)  BP(mean): --  RR: 18 (30 May 2025 04:00) (18 - 20)  SpO2: 93% (30 May 2025 04:00) (93% - 98%)    Parameters below as of 30 May 2025 04:00  Patient On (Oxygen Delivery Method): room air    Drug Dosing Weight  Height (cm): 152.4 (08 Apr 2025 21:57)  Weight (kg): 62.5 (29 May 2025 22:00)  BMI (kg/m2): 26.9 (29 May 2025 22:00)  BSA (m2): 1.59 (29 May 2025 22:00)  I&O's Detail    29 May 2025 07:01  -  30 May 2025 07:00  --------------------------------------------------------  IN:    Lactated Ringers: 780 mL  Total IN: 780 mL    OUT:    Voided (mL): 1125 mL  Total OUT: 1125 mL    Total NET: -345 mL    Allergies    Allergy Status Unknown    Intolerances                        10.3   22.10 )-----------( 257      ( 30 May 2025 03:25 )             33.7   05-29    137  |  100  |  21.8[H]  ----------------------------<  91  4.4   |  24.0  |  1.35[H]    Ca    8.5      29 May 2025 09:20    TPro  6.1[L]  /  Alb  2.9[L]  /  TBili  0.7  /  DBili  x   /  AST  27  /  ALT  27  /  AlkPhos  77  05-29  PT/INR - ( 29 May 2025 09:20 )   PT: 18.1 sec;   INR: 1.57 ratio      PTT - ( 30 May 2025 03:25 )  PTT:122.9 sec    PHYSICAL EXAM:    MEDICATIONS  (STANDING):  acetaminophen     Tablet .. 975 milliGRAM(s) Oral every 6 hours  aMIOdarone    Tablet 200 milliGRAM(s) Oral daily  amLODIPine   Tablet 5 milliGRAM(s) Oral daily  atorvastatin 80 milliGRAM(s) Oral at bedtime  clopidogrel Tablet 75 milliGRAM(s) Oral daily  heparin  Infusion.  Unit(s)/Hr (11 mL/Hr) IV Continuous <Continuous>  lactated ringers. 1000 milliLiter(s) (65 mL/Hr) IV Continuous <Continuous>  levothyroxine 125 MICROGram(s) Oral daily  metoprolol tartrate 25 milliGRAM(s) Oral two times a day  piperacillin/tazobactam IVPB.. 3.375 Gram(s) IV Intermittent every 8 hours    MEDICATIONS  (PRN):  albuterol    90 MICROgram(s) HFA Inhaler 2 Puff(s) Inhalation every 6 hours PRN Shortness of Breath  heparin   Injectable 5000 Unit(s) IV Push every 6 hours PRN For aPTT less than 40  heparin   Injectable 2500 Unit(s) IV Push every 6 hours PRN For aPTT between 40 - 57  tiotropium 2.5 MICROgram(s) Inhaler 2 Puff(s) Inhalation daily PRN SOB    RADIOLOGY STUDIES:    CULTURES:    PHYSICAL EXAM:   General: NAD, Lying in bed resting comfortably  Neuro: A+Ox3  HEENT: EOMI, PERRLA, MMM  Cardio: RRR  Resp: Non labored breathing on RA  GI/Abd: Soft, tender in the LLQ, no distention    Assessment:  89y Female with PMHX htn, hypothyroidism, CAD. afib, recent stents placed (1.5 months ago), on eliquis and plavix (last dose eliquis 5/28), on life vest presented with 1 day of abd pain and nausea, CT scan demonstrates perforated appendicitis.      Plan:   - continue heparin ggt, ppt q 6 until therapeutic, follow full AC algorithm  - continue plavix, case was discussed with IR yesterday, IR ammenable to procedure with patient on plavix, ok to continue plavix.   - case was also discussed with cards, rec continue at least 1 antiplatlet agent  - will f/u with IR today regarding possible drainage  - maintain NPO for now  - continue IVF  - SCDs  - OOB to chair, IS 10 x / hr while awake      
Subjective: Patient seen and examined at bedside, no current complaints, no overnight events, denies SOB/CP/N/V.     MEDICATIONS  (STANDING):  acetaminophen     Tablet .. 975 milliGRAM(s) Oral every 6 hours  aMIOdarone    Tablet 200 milliGRAM(s) Oral daily  amLODIPine   Tablet 5 milliGRAM(s) Oral daily  atorvastatin 80 milliGRAM(s) Oral at bedtime  clopidogrel Tablet 75 milliGRAM(s) Oral daily  heparin  Infusion. 800 Unit(s)/Hr (8 mL/Hr) IV Continuous <Continuous>  lactated ringers. 1000 milliLiter(s) (75 mL/Hr) IV Continuous <Continuous>  levothyroxine 125 MICROGram(s) Oral daily  lidocaine   4% Patch 2 Patch Transdermal every 24 hours  metoprolol tartrate 25 milliGRAM(s) Oral two times a day  piperacillin/tazobactam IVPB.. 3.375 Gram(s) IV Intermittent every 8 hours    MEDICATIONS  (PRN):  albuterol    90 MICROgram(s) HFA Inhaler 1 Puff(s) Inhalation every 6 hours PRN Wheezing  albuterol   0.042% 1.25 milliGRAM(s) Nebulizer every 6 hours PRN Wheezing  heparin   Injectable 5000 Unit(s) IV Push every 6 hours PRN For aPTT less than 40  heparin   Injectable 2500 Unit(s) IV Push every 6 hours PRN For aPTT between 40 - 57  tiotropium 2.5 MICROgram(s) Inhaler 2 Puff(s) Inhalation daily PRN SOB      Vital Signs Last 24 Hrs  T(C): 36.3 (30 May 2025 21:57), Max: 36.6 (30 May 2025 04:00)  T(F): 97.4 (30 May 2025 21:57), Max: 97.9 (30 May 2025 04:00)  HR: 64 (30 May 2025 21:57) (63 - 69)  BP: 132/61 (30 May 2025 21:57) (114/67 - 139/85)  BP(mean): --  RR: 18 (30 May 2025 21:57) (18 - 18)  SpO2: 97% (30 May 2025 21:57) (92% - 97%)    Parameters below as of 30 May 2025 21:57  Patient On (Oxygen Delivery Method): room air        Physical Exam:    Constitutional: NAD  HEENT: PERRL, EOMI  Respiratory: Respirations non-labored, no accessory muscle use  Gastrointestinal: Soft, LLQ mildly tender, non-distended  Neurological: A&O x 3      LABS:                        9.9    21.00 )-----------( 257      ( 30 May 2025 19:13 )             33.2     05-30    135  |  99  |  20.3[H]  ----------------------------<  117[H]  4.1   |  22.0  |  1.53[H]    Ca    8.3[L]      30 May 2025 19:13  Phos  3.2     05-30  Mg     2.4     05-30    TPro  6.1[L]  /  Alb  2.9[L]  /  TBili  0.7  /  DBili  x   /  AST  27  /  ALT  27  /  AlkPhos  77  05-29    PT/INR - ( 29 May 2025 09:20 )   PT: 18.1 sec;   INR: 1.57 ratio         PTT - ( 30 May 2025 19:13 )  PTT:84.8 sec  Urinalysis Basic - ( 30 May 2025 19:13 )    Color: x / Appearance: x / SG: x / pH: x  Gluc: 117 mg/dL / Ketone: x  / Bili: x / Urobili: x   Blood: x / Protein: x / Nitrite: x   Leuk Esterase: x / RBC: x / WBC x   Sq Epi: x / Non Sq Epi: x / Bacteria: x      A: 89y Female with PMHX htn, hypothyroidism, CAD. afib, recent stents placed (1.5 months ago), on eliquis and plavix (last dose eliquis 5/28), on life vest admitted with perforated appendicitis, being managed conservatively    P:  Hep gtt and plavix de to hx of stent  Pain control  Anti-emetics PRN  OOB as tolerated  Encourage IS  Tolerating CLD  AM labs/ replete lytes as needed  F/U infectious panel    
HPI/OVERNIGHT EVENTS: Patient seen and examined at bedside this AM. No overnight events. no fever / chills , tolerating regular diet , having BF         Vital Signs Last 24 Hrs  T(C): 36.7 (03 Jun 2025 09:36), Max: 36.8 (02 Jun 2025 12:07)  T(F): 98.1 (03 Jun 2025 09:36), Max: 98.2 (02 Jun 2025 12:07)  HR: 70 (03 Jun 2025 09:36) (61 - 71)  BP: 126/78 (03 Jun 2025 09:36) (122/69 - 158/72)  BP(mean): --  RR: 16 (03 Jun 2025 09:36) (16 - 18)  SpO2: 93% (03 Jun 2025 09:36) (92% - 98%)    Parameters below as of 03 Jun 2025 09:36  Patient On (Oxygen Delivery Method): room air        I&O's Detail    02 Jun 2025 07:01  -  03 Jun 2025 07:00  --------------------------------------------------------  IN:    Heparin Infusion: 40 mL    Oral Fluid: 240 mL  Total IN: 280 mL    OUT:    Voided (mL): 200 mL  Total OUT: 200 mL    Total NET: 80 mL          Constitutional: patient resting comfortably in bed, in no acute distress  HEENT: MMM.  Respiratory: Non labored breathing on RA  Cardiovascular: RRR  Gastrointestinal: Abdomen soft, mild RLQ tender, non-distended      LABS:                        9.6    11.77 )-----------( 267      ( 03 Jun 2025 05:30 )             31.9     06-03    138  |  102  |  13.1  ----------------------------<  85  4.0   |  23.0  |  1.25    Ca    8.3[L]      03 Jun 2025 05:30  Phos  3.0     06-03  Mg     1.9     06-03      PTT - ( 03 Jun 2025 05:30 )  PTT:>200.0 sec  Urinalysis Basic - ( 03 Jun 2025 05:30 )    Color: x / Appearance: x / SG: x / pH: x  Gluc: 85 mg/dL / Ketone: x  / Bili: x / Urobili: x   Blood: x / Protein: x / Nitrite: x   Leuk Esterase: x / RBC: x / WBC x   Sq Epi: x / Non Sq Epi: x / Bacteria: x        MEDICATIONS  (STANDING):  acetaminophen     Tablet .. 975 milliGRAM(s) Oral every 6 hours  albuterol   0.042% 1.25 milliGRAM(s) Nebulizer two times a day  aMIOdarone    Tablet 200 milliGRAM(s) Oral daily  amLODIPine   Tablet 5 milliGRAM(s) Oral daily  amoxicillin  875 milliGRAM(s)/clavulanate 1 Tablet(s) Oral two times a day  apixaban 5 milliGRAM(s) Oral every 12 hours  atorvastatin 80 milliGRAM(s) Oral at bedtime  clopidogrel Tablet 75 milliGRAM(s) Oral daily  fluticasone propionate/ salmeterol 100-50 MICROgram(s) Diskus 1 Dose(s) Inhalation two times a day  levothyroxine 125 MICROGram(s) Oral daily  lidocaine   4% Patch 2 Patch Transdermal every 24 hours  metoprolol tartrate 25 milliGRAM(s) Oral two times a day  tiotropium 2.5 MICROgram(s) Inhaler 2 Puff(s) Inhalation daily    MEDICATIONS  (PRN):      MICRO:   Cultures     STUDIES:   EKG, CXR, U/S, CT, MRI   
Subjective:   Patient seen and examined. Doing well post op. No acute events overnight. Tolerating diet and pain well controlled. Ambulating. Denies HA NV CP SOB dizziness          MEDICATIONS  (STANDING):  acetaminophen     Tablet .. 975 milliGRAM(s) Oral every 6 hours  aMIOdarone    Tablet 200 milliGRAM(s) Oral daily  amLODIPine   Tablet 5 milliGRAM(s) Oral daily  atorvastatin 80 milliGRAM(s) Oral at bedtime  clopidogrel Tablet 75 milliGRAM(s) Oral daily  heparin  Infusion. 800 Unit(s)/Hr (8 mL/Hr) IV Continuous <Continuous>  lactated ringers. 1000 milliLiter(s) (75 mL/Hr) IV Continuous <Continuous>  levothyroxine 125 MICROGram(s) Oral daily  lidocaine   4% Patch 2 Patch Transdermal every 24 hours  metoprolol tartrate 25 milliGRAM(s) Oral two times a day  piperacillin/tazobactam IVPB.. 3.375 Gram(s) IV Intermittent every 8 hours    MEDICATIONS  (PRN):  albuterol    90 MICROgram(s) HFA Inhaler 1 Puff(s) Inhalation every 6 hours PRN Wheezing  albuterol   0.042% 1.25 milliGRAM(s) Nebulizer every 6 hours PRN Wheezing  heparin   Injectable 5000 Unit(s) IV Push every 6 hours PRN For aPTT less than 40  heparin   Injectable 2500 Unit(s) IV Push every 6 hours PRN For aPTT between 40 - 57  tiotropium 2.5 MICROgram(s) Inhaler 2 Puff(s) Inhalation daily PRN SOB      Vital Signs Last 24 Hrs  T(C): 36.9 (01 Jun 2025 05:09), Max: 36.9 (01 Jun 2025 00:12)  T(F): 98.4 (01 Jun 2025 05:09), Max: 98.4 (01 Jun 2025 00:12)  HR: 66 (01 Jun 2025 05:09) (59 - 74)  BP: 137/69 (01 Jun 2025 05:09) (119/50 - 151/75)  BP(mean): --  RR: 18 (01 Jun 2025 05:09) (18 - 19)  SpO2: 93% (01 Jun 2025 05:09) (92% - 95%)    Parameters below as of 01 Jun 2025 05:09  Patient On (Oxygen Delivery Method): room air        Physical Exam:    Constitutional: NAD  HEENT: PERRL, EOMI  Respiratory: Respirations non-labored, no accessory muscle use  Gastrointestinal: Soft, LLQ mildly tender, non-distended  Neurological: A&O x 3          LABS:                        10.1   20.52 )-----------( 272      ( 31 May 2025 05:33 )             33.5     05-31    136  |  99  |  18.8  ----------------------------<  77  4.1   |  24.0  |  1.47[H]    Ca    8.4      31 May 2025 05:33  Phos  3.7     05-31  Mg     2.3     05-31      PTT - ( 31 May 2025 05:33 )  PTT:67.3 sec  Urinalysis Basic - ( 31 May 2025 05:33 )    Color: x / Appearance: x / SG: x / pH: x  Gluc: 77 mg/dL / Ketone: x  / Bili: x / Urobili: x   Blood: x / Protein: x / Nitrite: x   Leuk Esterase: x / RBC: x / WBC x   Sq Epi: x / Non Sq Epi: x / Bacteria: x

## 2025-06-03 NOTE — PROGRESS NOTE ADULT - ATTENDING COMMENTS
Patient seen and examined on AM rounds  Doing well  Pain seems to be well controlled  Tolerating regular diet  vitals stable  WBC 18 -> 15 -> 11 last 3 days    - D/C heparin infusion and restart home Eliquis  - Ok for discharge home on oral antibiotics  - Will need outpatient follow up for consideration of colonoscopy when acute event is improved

## 2025-06-03 NOTE — DISCHARGE NOTE NURSING/CASE MANAGEMENT/SOCIAL WORK - PATIENT PORTAL LINK FT
You can access the FollowMyHealth Patient Portal offered by [facility] by registering at the following website: [email or web address]. By joining [employer]’s FollowMyHealth portal, you will also be able to view your health information using other applications (apps) compatible with our system.
Statement Selected

## 2025-06-03 NOTE — PROGRESS NOTE ADULT - PROVIDER SPECIALTY LIST ADULT
Surgery
Otezla Pregnancy And Lactation Text: This medication is Pregnancy Category C and it isn't known if it is safe during pregnancy. It is unknown if it is excreted in breast milk.

## 2025-06-03 NOTE — PROVIDER CONTACT NOTE (CRITICAL VALUE NOTIFICATION) - SITUATION
APTT = 122.9
CELESTE Coates made aware pt's PTT level report as critical by lab at 14:22 level at 143.6.
no change in status

## 2025-06-03 NOTE — PROVIDER CONTACT NOTE (CRITICAL VALUE NOTIFICATION) - ACTION/TREATMENT ORDERED:
Follow nomogram
follow heparin nomogram protocol. stop heparin gtt for 1 hr then decrease rate by 2.
Heparin drip adjusted to 8ml/hr from 9ml/hr.

## 2025-06-03 NOTE — PHYSICAL THERAPY INITIAL EVALUATION ADULT - ADDITIONAL COMMENTS
Pt lives with daughter and son in law in private home, 5 steps down to her living space. Ambulates with RW PTA, has assist from family as needed. Pt owns RW, rollator

## 2025-06-03 NOTE — DISCHARGE NOTE NURSING/CASE MANAGEMENT/SOCIAL WORK - FINANCIAL ASSISTANCE
[facility] provides services at a reduced cost to those who are determined to be eligible through [facility]’s financial assistance program. Information regarding [facility]’s financial assistance program can be found by going to [email or web address] or by calling [phone].

## 2025-06-12 ENCOUNTER — APPOINTMENT (OUTPATIENT)
Dept: CARDIOLOGY | Facility: CLINIC | Age: 89
End: 2025-06-12

## 2025-06-19 ENCOUNTER — APPOINTMENT (OUTPATIENT)
Dept: TRAUMA SURGERY | Facility: CLINIC | Age: 89
End: 2025-06-19
Payer: MEDICARE

## 2025-06-19 VITALS
HEART RATE: 76 BPM | HEIGHT: 57 IN | WEIGHT: 140 LBS | RESPIRATION RATE: 14 BRPM | OXYGEN SATURATION: 97 % | DIASTOLIC BLOOD PRESSURE: 51 MMHG | BODY MASS INDEX: 30.2 KG/M2 | SYSTOLIC BLOOD PRESSURE: 129 MMHG | TEMPERATURE: 98.3 F

## 2025-06-19 PROCEDURE — 99213 OFFICE O/P EST LOW 20 MIN: CPT

## 2025-06-20 PROBLEM — K35.32 PERFORATED APPENDICITIS: Status: ACTIVE | Noted: 2025-06-20

## 2025-07-02 ENCOUNTER — APPOINTMENT (OUTPATIENT)
Dept: CARDIOLOGY | Facility: CLINIC | Age: 89
End: 2025-07-02
Payer: MEDICARE

## 2025-07-02 ENCOUNTER — NON-APPOINTMENT (OUTPATIENT)
Age: 89
End: 2025-07-02

## 2025-07-02 VITALS
HEIGHT: 57 IN | OXYGEN SATURATION: 98 % | BODY MASS INDEX: 28.48 KG/M2 | SYSTOLIC BLOOD PRESSURE: 137 MMHG | WEIGHT: 132 LBS | HEART RATE: 55 BPM | DIASTOLIC BLOOD PRESSURE: 67 MMHG

## 2025-07-02 PROCEDURE — 93000 ELECTROCARDIOGRAM COMPLETE: CPT

## 2025-07-02 PROCEDURE — 99204 OFFICE O/P NEW MOD 45 MIN: CPT | Mod: 25

## 2025-07-02 RX ORDER — HYDROCHLOROTHIAZIDE 12.5 MG/1
12.5 TABLET ORAL DAILY
Qty: 90 | Refills: 0 | Status: ACTIVE | COMMUNITY
Start: 2025-07-02 | End: 1900-01-01

## 2025-07-10 ENCOUNTER — APPOINTMENT (OUTPATIENT)
Dept: ELECTROPHYSIOLOGY | Facility: CLINIC | Age: 89
End: 2025-07-10
Payer: MEDICARE

## 2025-07-10 ENCOUNTER — APPOINTMENT (OUTPATIENT)
Dept: CARDIOLOGY | Facility: CLINIC | Age: 89
End: 2025-07-10

## 2025-07-10 PROCEDURE — 93000 ELECTROCARDIOGRAM COMPLETE: CPT

## 2025-07-10 PROCEDURE — 99215 OFFICE O/P EST HI 40 MIN: CPT | Mod: 25

## 2025-07-10 PROCEDURE — 93306 TTE W/DOPPLER COMPLETE: CPT

## 2025-07-10 RX ORDER — AMIODARONE HYDROCHLORIDE 200 MG/1
200 TABLET ORAL DAILY
Refills: 0 | Status: ACTIVE | COMMUNITY

## 2025-07-10 RX ORDER — CLOPIDOGREL 75 MG/1
75 TABLET, FILM COATED ORAL DAILY
Refills: 0 | Status: ACTIVE | COMMUNITY

## 2025-07-17 ENCOUNTER — APPOINTMENT (OUTPATIENT)
Dept: PULMONOLOGY | Facility: CLINIC | Age: 89
End: 2025-07-17

## 2025-07-17 ENCOUNTER — APPOINTMENT (OUTPATIENT)
Dept: CARDIOLOGY | Facility: CLINIC | Age: 89
End: 2025-07-17
Payer: MEDICARE

## 2025-07-17 VITALS
DIASTOLIC BLOOD PRESSURE: 62 MMHG | HEIGHT: 56.75 IN | HEART RATE: 57 BPM | SYSTOLIC BLOOD PRESSURE: 138 MMHG | BODY MASS INDEX: 28 KG/M2 | WEIGHT: 128 LBS | OXYGEN SATURATION: 100 %

## 2025-07-17 VITALS — WEIGHT: 128 LBS | HEIGHT: 56.75 IN | BODY MASS INDEX: 28 KG/M2

## 2025-07-17 VITALS
DIASTOLIC BLOOD PRESSURE: 64 MMHG | HEART RATE: 57 BPM | SYSTOLIC BLOOD PRESSURE: 120 MMHG | RESPIRATION RATE: 16 BRPM | OXYGEN SATURATION: 99 %

## 2025-07-17 PROBLEM — I48.91 AFIB: Status: ACTIVE | Noted: 2025-07-02

## 2025-07-17 PROBLEM — R60.0 BILATERAL EDEMA OF LOWER EXTREMITY: Status: ACTIVE | Noted: 2025-07-02

## 2025-07-17 PROBLEM — I10 HTN (HYPERTENSION): Status: ACTIVE | Noted: 2025-07-02

## 2025-07-17 PROCEDURE — 85018 HEMOGLOBIN: CPT | Mod: QW

## 2025-07-17 PROCEDURE — 94010 BREATHING CAPACITY TEST: CPT

## 2025-07-17 PROCEDURE — 99215 OFFICE O/P EST HI 40 MIN: CPT | Mod: 25

## 2025-07-17 PROCEDURE — G2211 COMPLEX E/M VISIT ADD ON: CPT

## 2025-07-17 PROCEDURE — 99214 OFFICE O/P EST MOD 30 MIN: CPT

## 2025-07-17 PROCEDURE — 94727 GAS DIL/WSHOT DETER LNG VOL: CPT

## 2025-07-17 PROCEDURE — 94729 DIFFUSING CAPACITY: CPT

## 2025-07-17 PROCEDURE — 93000 ELECTROCARDIOGRAM COMPLETE: CPT

## 2025-07-17 RX ORDER — FUROSEMIDE 40 MG/1
40 TABLET ORAL DAILY
Qty: 30 | Refills: 3 | Status: ACTIVE | COMMUNITY
Start: 1900-01-01 | End: 1900-01-01

## 2025-07-17 RX ORDER — ATORVASTATIN CALCIUM 80 MG/1
TABLET, FILM COATED ORAL
Refills: 0 | Status: ACTIVE | COMMUNITY

## 2025-07-17 RX ORDER — LOSARTAN POTASSIUM 100 MG/1
TABLET, FILM COATED ORAL
Refills: 0 | Status: ACTIVE | COMMUNITY

## 2025-07-17 RX ORDER — METOPROLOL TARTRATE 75 MG/1
TABLET, FILM COATED ORAL
Refills: 0 | Status: ACTIVE | COMMUNITY

## 2025-07-17 RX ORDER — ENSIFENTRINE 3 MG/2.5ML
3 SUSPENSION RESPIRATORY (INHALATION)
Qty: 60 | Refills: 5 | Status: ACTIVE | COMMUNITY
Start: 2025-07-17 | End: 1900-01-01

## 2025-08-04 RX ORDER — AMIODARONE HYDROCHLORIDE 200 MG/1
200 TABLET ORAL DAILY
Qty: 90 | Refills: 0 | Status: ACTIVE | COMMUNITY
Start: 2025-08-04 | End: 1900-01-01

## 2025-08-04 RX ORDER — ATORVASTATIN CALCIUM 80 MG/1
80 TABLET, FILM COATED ORAL
Qty: 90 | Refills: 1 | Status: ACTIVE | COMMUNITY
Start: 2025-08-04 | End: 1900-01-01

## 2025-08-05 ENCOUNTER — TRANSCRIPTION ENCOUNTER (OUTPATIENT)
Age: 89
End: 2025-08-05

## 2025-08-05 ENCOUNTER — OUTPATIENT (OUTPATIENT)
Dept: OUTPATIENT SERVICES | Facility: HOSPITAL | Age: 89
LOS: 1 days | End: 2025-08-05
Payer: MEDICARE

## 2025-08-05 ENCOUNTER — RX RENEWAL (OUTPATIENT)
Age: 89
End: 2025-08-05

## 2025-08-05 VITALS
HEART RATE: 76 BPM | DIASTOLIC BLOOD PRESSURE: 54 MMHG | OXYGEN SATURATION: 100 % | SYSTOLIC BLOOD PRESSURE: 152 MMHG | RESPIRATION RATE: 12 BRPM

## 2025-08-05 VITALS
RESPIRATION RATE: 12 BRPM | OXYGEN SATURATION: 100 % | DIASTOLIC BLOOD PRESSURE: 73 MMHG | TEMPERATURE: 98 F | SYSTOLIC BLOOD PRESSURE: 149 MMHG | HEART RATE: 73 BPM

## 2025-08-05 DIAGNOSIS — I46.9 CARDIAC ARREST, CAUSE UNSPECIFIED: ICD-10-CM

## 2025-08-05 PROCEDURE — C1764: CPT

## 2025-08-05 PROCEDURE — 33285 INSJ SUBQ CAR RHYTHM MNTR: CPT

## 2025-08-05 RX ORDER — LOSARTAN POTASSIUM 100 MG/1
1 TABLET, FILM COATED ORAL
Refills: 0 | DISCHARGE

## 2025-08-05 RX ORDER — APIXABAN 5 MG/1
1 TABLET, FILM COATED ORAL
Refills: 0 | DISCHARGE

## 2025-08-05 RX ORDER — LEVOTHYROXINE SODIUM 300 MCG
1 TABLET ORAL
Refills: 0 | DISCHARGE

## 2025-08-05 RX ORDER — FUROSEMIDE 10 MG/ML
1 INJECTION INTRAMUSCULAR; INTRAVENOUS
Refills: 0 | DISCHARGE

## 2025-08-05 RX ORDER — CEPHALEXIN 250 MG/1
500 CAPSULE ORAL ONCE
Refills: 0 | Status: COMPLETED | OUTPATIENT
Start: 2025-08-05 | End: 2025-08-05

## 2025-08-05 RX ORDER — LIDOCAINE HCL/EPINEPHRINE/PF 1 %-1:200K
20 AMPUL (ML) INJECTION ONCE
Refills: 0 | Status: COMPLETED | OUTPATIENT
Start: 2025-08-05 | End: 2025-08-05

## 2025-08-05 RX ADMIN — CEPHALEXIN 500 MILLIGRAM(S): 250 CAPSULE ORAL at 07:17

## 2025-08-05 RX ADMIN — Medication 20 MILLILITER(S): at 07:18

## 2025-08-19 ENCOUNTER — RX CHANGE (OUTPATIENT)
Age: 89
End: 2025-08-19